# Patient Record
Sex: FEMALE | Race: ASIAN | NOT HISPANIC OR LATINO
[De-identification: names, ages, dates, MRNs, and addresses within clinical notes are randomized per-mention and may not be internally consistent; named-entity substitution may affect disease eponyms.]

---

## 2022-08-16 PROBLEM — Z00.00 ENCOUNTER FOR PREVENTIVE HEALTH EXAMINATION: Status: ACTIVE | Noted: 2022-08-16

## 2022-09-13 ENCOUNTER — APPOINTMENT (OUTPATIENT)
Dept: OBGYN | Facility: CLINIC | Age: 32
End: 2022-09-13

## 2022-09-13 VITALS
DIASTOLIC BLOOD PRESSURE: 58 MMHG | TEMPERATURE: 98 F | SYSTOLIC BLOOD PRESSURE: 78 MMHG | HEIGHT: 63 IN | BODY MASS INDEX: 26.58 KG/M2 | WEIGHT: 150 LBS | HEART RATE: 78 BPM

## 2022-09-13 DIAGNOSIS — Z78.9 OTHER SPECIFIED HEALTH STATUS: ICD-10-CM

## 2022-09-13 DIAGNOSIS — Z86.79 PERSONAL HISTORY OF OTHER DISEASES OF THE CIRCULATORY SYSTEM: ICD-10-CM

## 2022-09-13 DIAGNOSIS — Z87.42 PERSONAL HISTORY OF OTHER DISEASES OF THE FEMALE GENITAL TRACT: ICD-10-CM

## 2022-09-13 DIAGNOSIS — Z82.49 FAMILY HISTORY OF ISCHEMIC HEART DISEASE AND OTHER DISEASES OF THE CIRCULATORY SYSTEM: ICD-10-CM

## 2022-09-13 PROCEDURE — 99385 PREV VISIT NEW AGE 18-39: CPT

## 2022-09-15 PROBLEM — Z78.9 CONSUMES ALCOHOL OCCASIONALLY: Status: ACTIVE | Noted: 2022-09-13

## 2022-09-15 PROBLEM — Z78.9 EXERCISES OCCASIONALLY: Status: ACTIVE | Noted: 2022-09-13

## 2022-09-15 PROBLEM — Z87.42 HISTORY OF DYSMENORRHEA: Status: RESOLVED | Noted: 2022-09-13 | Resolved: 2022-09-15

## 2022-09-15 PROBLEM — Z86.79 HISTORY OF CARDIAC MURMUR: Status: RESOLVED | Noted: 2022-09-13 | Resolved: 2022-09-15

## 2022-09-15 PROBLEM — Z82.49 FAMILY HISTORY OF HYPERTENSION: Status: ACTIVE | Noted: 2022-09-13

## 2022-09-15 LAB
C TRACH RRNA SPEC QL NAA+PROBE: NOT DETECTED
HPV HIGH+LOW RISK DNA PNL CVX: NOT DETECTED
N GONORRHOEA RRNA SPEC QL NAA+PROBE: NOT DETECTED
SOURCE AMPLIFICATION: NORMAL
SOURCE AMPLIFICATION: NORMAL
T VAGINALIS RRNA SPEC QL NAA+PROBE: NOT DETECTED

## 2022-09-15 NOTE — DISCUSSION/SUMMARY
[FreeTextEntry1] : A: 32-year-old for annual GYN exam, dysmenorrhea, heavy menses, BMI 26\par \par P: GYN exam done\par Pap smear done\par Safe sex practices\par Pain and bleeding precautions\par Encourage menstrual diary\par Contraception counseling done-patient wants nothing at this time\par Naproxen Rx given as needed\par Referral for pelvic ultrasound given\par Preconception counseling and future fertility management options counseled\par Encourage healthy diet and exercise\par Follow-up after imaging or as needed

## 2022-09-15 NOTE — HISTORY OF PRESENT ILLNESS
[Patient reported PAP Smear was normal] : Patient reported PAP Smear was normal [N] : Patient reports normal menses [Y] : Positive pregnancy history [Regular Cycle Intervals] : periods have been regular [Frequency: Q ___ days] : menstrual periods occur approximately every [unfilled] days [Menarche Age: ____] : age at menarche was [unfilled] [No] : Patient does not have concerns regarding sex [Currently Active] : currently active [Men] : men [Yes] : Yes [Condoms] : Condoms [HIV test declined] : HIV test declined [Syphilis test declined] : Syphilis test declined [Gonorrhea test offered] : Gonorrhea test offered [Chlamydia test offered] : Chlamydia test offered [Trichomonas test offered] : Trichomonas test offered [HPV test offered] : HPV test offered [Hepatitis B test declined] : Hepatitis B test declined [Hepatitis C test declined] : Hepatitis C test declined [Menstrual Cramps] : menstrual cramps [TextBox_4] : 32-year-old para 0-0-1-0 with LMP 8/23/2022 came for annual GYN exam and Pap smear.  Patient denies abnormal uterine bleeding but states her irregular menses has become heavier for the first 2 days and also significant pain 2 days prior in the first couple days of her period over the last year.  She states that she changes a pad every 2-3 hours with some clots on the heavy days.  She denies any symptoms of anemia.  She denies any discharge or dysuria.  She also denies any abnormal Pap, HPV, STDs, fibroids or cysts.  She is sexually active and uses condoms for contraception.  She states she tried oral combined contraception in the past to help menses and for contraceptive purposes however states that it gave her a lot of emotional/psych symptoms that she did not feel comfortable to continue.\par \par Patient also has many questions regarding pregnancy and future fertility.  Preconception counseling done and patient also counseled on options of egg freezing if she is not planning fertility in the near future.  We discussed risks of AMA with pregnancy.  Patient offered referral for [facility] consultation to further evaluate her options but she does not want at this time. [PapSmeardate] : 2021 [LMPDate] : 8/23/22 [MensesFreq] : 28 [MensesLength] : 4-5 [MensesAmount] : heavy to light  [PGxTotal] : 1 [PGHxFullTerm] : 0 [PGHxPremature] : 0 [PGHxAbortions] : 1 [[facility] : 0 [PGHxABInduced] : 1 [PGHxABSpont] : 0 [PGHxEctopic] : 0 [PGHxMultBirths] : 0 [FreeTextEntry1] : 8/23/22

## 2022-09-20 ENCOUNTER — TRANSCRIPTION ENCOUNTER (OUTPATIENT)
Age: 32
End: 2022-09-20

## 2022-09-20 LAB — CYTOLOGY CVX/VAG DOC THIN PREP: NORMAL

## 2022-10-05 ENCOUNTER — APPOINTMENT (OUTPATIENT)
Dept: OBGYN | Facility: CLINIC | Age: 32
End: 2022-10-05

## 2022-10-05 DIAGNOSIS — N92.0 EXCESSIVE AND FREQUENT MENSTRUATION WITH REGULAR CYCLE: ICD-10-CM

## 2022-10-05 DIAGNOSIS — N83.8 OTHER NONINFLAMMATORY DISORDERS OF OVARY, FALLOPIAN TUBE AND BROAD LIGAMENT: ICD-10-CM

## 2022-10-05 DIAGNOSIS — N94.6 DYSMENORRHEA, UNSPECIFIED: ICD-10-CM

## 2022-10-05 PROCEDURE — 99442: CPT

## 2022-10-09 PROBLEM — N92.0 HEAVY MENSES: Status: ACTIVE | Noted: 2022-09-15

## 2022-10-09 PROBLEM — N83.8 CALCIFICATION OF OVARY: Status: ACTIVE | Noted: 2022-10-09

## 2022-10-09 PROBLEM — N94.6 DYSMENORRHEA: Status: ACTIVE | Noted: 2022-09-13

## 2022-10-11 ENCOUNTER — NON-APPOINTMENT (OUTPATIENT)
Age: 32
End: 2022-10-11

## 2022-10-27 ENCOUNTER — NON-APPOINTMENT (OUTPATIENT)
Age: 32
End: 2022-10-27

## 2022-11-10 ENCOUNTER — INPATIENT (INPATIENT)
Facility: HOSPITAL | Age: 32
LOS: 3 days | Discharge: HOME | End: 2022-11-14
Attending: INTERNAL MEDICINE | Admitting: INTERNAL MEDICINE

## 2022-11-10 VITALS
DIASTOLIC BLOOD PRESSURE: 60 MMHG | OXYGEN SATURATION: 99 % | WEIGHT: 149.91 LBS | TEMPERATURE: 98 F | RESPIRATION RATE: 18 BRPM | HEART RATE: 115 BPM | SYSTOLIC BLOOD PRESSURE: 127 MMHG

## 2022-11-10 LAB
ALBUMIN SERPL ELPH-MCNC: 3.7 G/DL — SIGNIFICANT CHANGE UP (ref 3.5–5.2)
ALBUMIN SERPL ELPH-MCNC: 4 G/DL — SIGNIFICANT CHANGE UP (ref 3.5–5.2)
ALP SERPL-CCNC: 134 U/L — HIGH (ref 30–115)
ALP SERPL-CCNC: 148 U/L — HIGH (ref 30–115)
ALT FLD-CCNC: 23 U/L — SIGNIFICANT CHANGE UP (ref 0–41)
ALT FLD-CCNC: 26 U/L — SIGNIFICANT CHANGE UP (ref 0–41)
ANION GAP SERPL CALC-SCNC: 12 MMOL/L — SIGNIFICANT CHANGE UP (ref 7–14)
ANION GAP SERPL CALC-SCNC: 14 MMOL/L — SIGNIFICANT CHANGE UP (ref 7–14)
APPEARANCE UR: CLEAR — SIGNIFICANT CHANGE UP
AST SERPL-CCNC: 34 U/L — SIGNIFICANT CHANGE UP (ref 0–41)
AST SERPL-CCNC: 38 U/L — SIGNIFICANT CHANGE UP (ref 0–41)
BACTERIA # UR AUTO: NEGATIVE — SIGNIFICANT CHANGE UP
BASOPHILS # BLD AUTO: 0.04 K/UL — SIGNIFICANT CHANGE UP (ref 0–0.2)
BASOPHILS # BLD AUTO: 0.04 K/UL — SIGNIFICANT CHANGE UP (ref 0–0.2)
BASOPHILS NFR BLD AUTO: 0.2 % — SIGNIFICANT CHANGE UP (ref 0–1)
BASOPHILS NFR BLD AUTO: 0.3 % — SIGNIFICANT CHANGE UP (ref 0–1)
BILIRUB SERPL-MCNC: 1.1 MG/DL — SIGNIFICANT CHANGE UP (ref 0.2–1.2)
BILIRUB SERPL-MCNC: 1.1 MG/DL — SIGNIFICANT CHANGE UP (ref 0.2–1.2)
BILIRUB UR-MCNC: NEGATIVE — SIGNIFICANT CHANGE UP
BLD GP AB SCN SERPL QL: SIGNIFICANT CHANGE UP
BUN SERPL-MCNC: 6 MG/DL — LOW (ref 10–20)
BUN SERPL-MCNC: 7 MG/DL — LOW (ref 10–20)
CALCIUM SERPL-MCNC: 9 MG/DL — SIGNIFICANT CHANGE UP (ref 8.4–10.5)
CALCIUM SERPL-MCNC: 9.5 MG/DL — SIGNIFICANT CHANGE UP (ref 8.4–10.4)
CHLORIDE SERPL-SCNC: 102 MMOL/L — SIGNIFICANT CHANGE UP (ref 98–110)
CHLORIDE SERPL-SCNC: 103 MMOL/L — SIGNIFICANT CHANGE UP (ref 98–110)
CK MB CFR SERPL CALC: <1 NG/ML — SIGNIFICANT CHANGE UP (ref 0.6–6.3)
CO2 SERPL-SCNC: 21 MMOL/L — SIGNIFICANT CHANGE UP (ref 17–32)
CO2 SERPL-SCNC: 21 MMOL/L — SIGNIFICANT CHANGE UP (ref 17–32)
COLOR SPEC: YELLOW — SIGNIFICANT CHANGE UP
CREAT SERPL-MCNC: 0.6 MG/DL — LOW (ref 0.7–1.5)
CREAT SERPL-MCNC: 0.7 MG/DL — SIGNIFICANT CHANGE UP (ref 0.7–1.5)
DIFF PNL FLD: NEGATIVE — SIGNIFICANT CHANGE UP
EGFR: 117 ML/MIN/1.73M2 — SIGNIFICANT CHANGE UP
EGFR: 121 ML/MIN/1.73M2 — SIGNIFICANT CHANGE UP
EOSINOPHIL # BLD AUTO: 0.09 K/UL — SIGNIFICANT CHANGE UP (ref 0–0.7)
EOSINOPHIL # BLD AUTO: 0.1 K/UL — SIGNIFICANT CHANGE UP (ref 0–0.7)
EOSINOPHIL NFR BLD AUTO: 0.6 % — SIGNIFICANT CHANGE UP (ref 0–8)
EOSINOPHIL NFR BLD AUTO: 0.6 % — SIGNIFICANT CHANGE UP (ref 0–8)
EPI CELLS # UR: 2 /HPF — SIGNIFICANT CHANGE UP (ref 0–5)
GLUCOSE SERPL-MCNC: 111 MG/DL — HIGH (ref 70–99)
GLUCOSE SERPL-MCNC: 113 MG/DL — HIGH (ref 70–99)
GLUCOSE UR QL: NEGATIVE — SIGNIFICANT CHANGE UP
HCG SERPL QL: NEGATIVE — SIGNIFICANT CHANGE UP
HCT VFR BLD CALC: 29.3 % — LOW (ref 37–47)
HCT VFR BLD CALC: 31.8 % — LOW (ref 37–47)
HGB BLD-MCNC: 10.3 G/DL — LOW (ref 12–16)
HGB BLD-MCNC: 9.6 G/DL — LOW (ref 12–16)
HYALINE CASTS # UR AUTO: 0 /LPF — SIGNIFICANT CHANGE UP (ref 0–7)
IMM GRANULOCYTES NFR BLD AUTO: 0.4 % — HIGH (ref 0.1–0.3)
IMM GRANULOCYTES NFR BLD AUTO: 0.5 % — HIGH (ref 0.1–0.3)
IRON SATN MFR SERPL: 28 UG/DL — LOW (ref 35–150)
IRON SATN MFR SERPL: 9 % — LOW (ref 15–50)
KETONES UR-MCNC: NEGATIVE — SIGNIFICANT CHANGE UP
LEUKOCYTE ESTERASE UR-ACNC: NEGATIVE — SIGNIFICANT CHANGE UP
LYMPHOCYTES # BLD AUTO: 1.79 K/UL — SIGNIFICANT CHANGE UP (ref 1.2–3.4)
LYMPHOCYTES # BLD AUTO: 12.8 % — LOW (ref 20.5–51.1)
LYMPHOCYTES # BLD AUTO: 13.3 % — LOW (ref 20.5–51.1)
LYMPHOCYTES # BLD AUTO: 2.25 K/UL — SIGNIFICANT CHANGE UP (ref 1.2–3.4)
MCHC RBC-ENTMCNC: 26.2 PG — LOW (ref 27–31)
MCHC RBC-ENTMCNC: 26.7 PG — LOW (ref 27–31)
MCHC RBC-ENTMCNC: 32.4 G/DL — SIGNIFICANT CHANGE UP (ref 32–37)
MCHC RBC-ENTMCNC: 32.8 G/DL — SIGNIFICANT CHANGE UP (ref 32–37)
MCV RBC AUTO: 80.9 FL — LOW (ref 81–99)
MCV RBC AUTO: 81.6 FL — SIGNIFICANT CHANGE UP (ref 81–99)
MONOCYTES # BLD AUTO: 0.37 K/UL — SIGNIFICANT CHANGE UP (ref 0.1–0.6)
MONOCYTES # BLD AUTO: 0.48 K/UL — SIGNIFICANT CHANGE UP (ref 0.1–0.6)
MONOCYTES NFR BLD AUTO: 2.6 % — SIGNIFICANT CHANGE UP (ref 1.7–9.3)
MONOCYTES NFR BLD AUTO: 2.8 % — SIGNIFICANT CHANGE UP (ref 1.7–9.3)
NEUTROPHILS # BLD AUTO: 11.63 K/UL — HIGH (ref 1.4–6.5)
NEUTROPHILS # BLD AUTO: 13.96 K/UL — HIGH (ref 1.4–6.5)
NEUTROPHILS NFR BLD AUTO: 82.6 % — HIGH (ref 42.2–75.2)
NEUTROPHILS NFR BLD AUTO: 83.3 % — HIGH (ref 42.2–75.2)
NITRITE UR-MCNC: NEGATIVE — SIGNIFICANT CHANGE UP
NRBC # BLD: 0 /100 WBCS — SIGNIFICANT CHANGE UP (ref 0–0)
NRBC # BLD: 0 /100 WBCS — SIGNIFICANT CHANGE UP (ref 0–0)
NT-PROBNP SERPL-SCNC: 2247 PG/ML — HIGH (ref 0–300)
PH UR: 6.5 — SIGNIFICANT CHANGE UP (ref 5–8)
PLATELET # BLD AUTO: 293 K/UL — SIGNIFICANT CHANGE UP (ref 130–400)
PLATELET # BLD AUTO: 342 K/UL — SIGNIFICANT CHANGE UP (ref 130–400)
POTASSIUM SERPL-MCNC: 4.5 MMOL/L — SIGNIFICANT CHANGE UP (ref 3.5–5)
POTASSIUM SERPL-MCNC: 4.6 MMOL/L — SIGNIFICANT CHANGE UP (ref 3.5–5)
POTASSIUM SERPL-SCNC: 4.5 MMOL/L — SIGNIFICANT CHANGE UP (ref 3.5–5)
POTASSIUM SERPL-SCNC: 4.6 MMOL/L — SIGNIFICANT CHANGE UP (ref 3.5–5)
PROT SERPL-MCNC: 7.2 G/DL — SIGNIFICANT CHANGE UP (ref 6–8)
PROT SERPL-MCNC: 7.5 G/DL — SIGNIFICANT CHANGE UP (ref 6–8)
PROT UR-MCNC: ABNORMAL
RBC # BLD: 3.59 M/UL — LOW (ref 4.2–5.4)
RBC # BLD: 3.93 M/UL — LOW (ref 4.2–5.4)
RBC # FLD: 14.6 % — HIGH (ref 11.5–14.5)
RBC # FLD: 14.6 % — HIGH (ref 11.5–14.5)
RBC CASTS # UR COMP ASSIST: 1 /HPF — SIGNIFICANT CHANGE UP (ref 0–4)
SARS-COV-2 RNA SPEC QL NAA+PROBE: SIGNIFICANT CHANGE UP
SODIUM SERPL-SCNC: 136 MMOL/L — SIGNIFICANT CHANGE UP (ref 135–146)
SODIUM SERPL-SCNC: 137 MMOL/L — SIGNIFICANT CHANGE UP (ref 135–146)
SP GR SPEC: 1.03 — SIGNIFICANT CHANGE UP (ref 1.01–1.03)
TIBC SERPL-MCNC: 306 UG/DL — SIGNIFICANT CHANGE UP (ref 220–430)
TROPONIN T SERPL-MCNC: <0.01 NG/ML — SIGNIFICANT CHANGE UP
UIBC SERPL-MCNC: 278 UG/DL — SIGNIFICANT CHANGE UP (ref 110–370)
UROBILINOGEN FLD QL: SIGNIFICANT CHANGE UP
WBC # BLD: 13.98 K/UL — HIGH (ref 4.8–10.8)
WBC # BLD: 16.92 K/UL — HIGH (ref 4.8–10.8)
WBC # FLD AUTO: 13.98 K/UL — HIGH (ref 4.8–10.8)
WBC # FLD AUTO: 16.92 K/UL — HIGH (ref 4.8–10.8)
WBC UR QL: 1 /HPF — SIGNIFICANT CHANGE UP (ref 0–5)

## 2022-11-10 PROCEDURE — 99223 1ST HOSP IP/OBS HIGH 75: CPT

## 2022-11-10 PROCEDURE — 93010 ELECTROCARDIOGRAM REPORT: CPT

## 2022-11-10 PROCEDURE — 71275 CT ANGIOGRAPHY CHEST: CPT | Mod: 26,MA

## 2022-11-10 PROCEDURE — 99285 EMERGENCY DEPT VISIT HI MDM: CPT

## 2022-11-10 PROCEDURE — 93306 TTE W/DOPPLER COMPLETE: CPT | Mod: 26

## 2022-11-10 RX ORDER — PANTOPRAZOLE SODIUM 20 MG/1
40 TABLET, DELAYED RELEASE ORAL
Refills: 0 | Status: DISCONTINUED | OUTPATIENT
Start: 2022-11-10 | End: 2022-11-14

## 2022-11-10 RX ORDER — GUAIFENESIN/DEXTROMETHORPHAN 600MG-30MG
10 TABLET, EXTENDED RELEASE 12 HR ORAL ONCE
Refills: 0 | Status: COMPLETED | OUTPATIENT
Start: 2022-11-10 | End: 2022-11-10

## 2022-11-10 RX ORDER — FAMOTIDINE 10 MG/ML
20 INJECTION INTRAVENOUS ONCE
Refills: 0 | Status: COMPLETED | OUTPATIENT
Start: 2022-11-10 | End: 2022-11-10

## 2022-11-10 RX ORDER — FUROSEMIDE 40 MG
40 TABLET ORAL ONCE
Refills: 0 | Status: COMPLETED | OUTPATIENT
Start: 2022-11-10 | End: 2022-11-10

## 2022-11-10 RX ADMIN — FAMOTIDINE 20 MILLIGRAM(S): 10 INJECTION INTRAVENOUS at 03:32

## 2022-11-10 RX ADMIN — Medication 30 MILLILITER(S): at 03:32

## 2022-11-10 RX ADMIN — Medication 10 MILLILITER(S): at 03:32

## 2022-11-10 RX ADMIN — Medication 40 MILLIGRAM(S): at 05:25

## 2022-11-10 NOTE — ED PROVIDER NOTE - NS ED ROS FT
Constitutional:  No fever, chills, lethargy, or abnormal weight loss  Eyes:  No eye pain or visual changes  ENMT: No nasal discharge, no toothache, no sore throat. No neck pain or stiffness  Cardiac:  see HPI  Respiratory:  see HPI   GI:  No nausea, vomiting, diarrhea or abdominal pain.  :  No dysuria, frequency or burning.  MS:  No back or joint pain.  Neuro:  No headache. No numbness, weakness, or tingling.   Skin:  No skin rash  Except as documented in the HPI,  all other systems are negative

## 2022-11-10 NOTE — ED ADULT NURSE REASSESSMENT NOTE - NS ED NURSE REASSESS COMMENT FT1
Patient assessed. Patient is A&Ox4. Patient denies any chest pain or discomfort. IV patent. No signs of distress noted. Patient resting comfortably at this time.

## 2022-11-10 NOTE — H&P ADULT - HISTORY OF PRESENT ILLNESS
31 yo F w PMH of congenital VSD (uncorrected) presented to the ED w chronic cough, SOB, hematemesis & hemoptysis.  Pt co chronic cough for the past 2 months, initially non-productive, now slightly productive associated with SOB. Initially she was able to climb one flight of stairs, but the SOB & cough have worsened over the past 3 weeks, now claims that she would be short of breath if she walked 5ft  She was seen by her PCP & pulmonologist for the cough & sob and was advised to use inhalers and azithromycin with no relief.  Yesterday night her cough worsened and also had an episode of vomiting, found 10-15 ml of blood mixed with the vomitus. Her cough episode later showed sputum w blood streaks. Claims that this was new, and never happened in the past 2 months.  Never smoked in life, FMHx NG for bleeding disorders, denies PND, orthopnea.    In the ED, , SpO2 97 on RA  labs: WBC 13.96, Hb 9.6 (baseline unknown), BNP 2247, trop <0.01, UA wnl  EKG: Line Sinus tachycardia + Possible Left atrial enlargement  CTA PE: Small right pleural effusion + bibasilar interlobular septal thickening + diffuse hazy ground-glass appearance (slightly more focal in the LLL)    received 40mg lasix IV in the ED

## 2022-11-10 NOTE — CONSULT NOTE ADULT - ATTENDING COMMENTS
Briefly, 32 year old woman who presented with shortness of breath, cough and hemoptysis. She has a known unrepaired VSD. 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 She also has a VSD, probably supracristal. Patient is currently sitting upright and able to speak in complete sentences, eating her breakfast. Her lungs are clear but she does have an elevated JVP. She needs to be kept euvolemic with lasix 40 iv. She said the PO dose of the lasix did not help her. She needs a TAVO today. Will discuss further management with CTS.     Thank you for this consult. Please call/MS teams with questions.

## 2022-11-10 NOTE — ED PROVIDER NOTE - PHYSICAL EXAMINATION
Gen: Alert, NAD, well appearing  Head: NC, AT  ENT: patent oropharynx without erythema/exudate, uvula midline  Neck: +supple, no tenderness/meningismus/JVD, +Trachea midline  Pulm: Bilateral BS, no wheeze/stridor/retractions, getting SOB while talking, mildly tachypneic  CV: tachycardic, +dist pulses  Mskel: no edema/erythema/cyanosis  Skin: no rash, warm/dry  Neuro: AAOx3, no sensory/motor deficits, CN 2-12 intact

## 2022-11-10 NOTE — CONSULT NOTE ADULT - NS ATTEND AMEND GEN_ALL_CORE FT
The patient is a 32-year-old lady we were called to the emergency room to evaluate for her new symptoms of hemoptysis and shortness of breath.    The patient herself tells me that she is known to have a cardiac murmur since birth and was told about her ventricular septal defect and apparently the patient and her mother were informed that this would close by the age of 4 but unfortunately never did.  Apparently many children in the family that she was born into had this defect but all of them closed and have no longer had a VSD.    The patient tells me that she lives in [place] and was visiting her boyfriend here in [place] when she had this episode of hemoptysis.  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    She said that she used to see a cardiologist on a regular basis till about 2017 and was not on any medications at any time during her course.    She denies any obvious source of an infective process like a rash or a skin infection or urinary infection or tooth infection etc.    She says that she was quite functional till about a few months ago as noted above in the history.    Even during this episode she denies any fever, new rashes, syncope, presyncope etc.    Clinically the patient is stable but she is clearly short of breath and says that she prefers to sit up.    On examination she has a very loud murmur of her ventricular septal defect and she also appears to have a separate murmur of mitral regurgitation in the left axilla radiating to the back.    There is no edema in her legs.    There is always a concern for infective endocarditis and possibly an acute rupture of one of the chordae to the mitral valve to explain her sudden onset of symptomatology which has been progressively been getting worse.    There is also concern for significant pulmonary hypertension given her lifelong history of a ventricular septal defect and the large pulmonary artery that is seen on the CT scan of her chest.    I discussed all these issues with the patient and the physicians looking after her in the observation unit in the emergency room in detail.    She will need all the suggestions that have been recorded above and below regarding optimizing her management.    I spent a significant amount of time at the patient's bedside as she had multiple questions and these were all answered.  More than 50% of my time was spent answering questions, counseling and coordinating her care.    For the present time her best option would be aggressive management by the cardiology team.

## 2022-11-10 NOTE — H&P ADULT - TIME BILLING
HPI as above.  Interval history: Pt seen and examined at bedside. No cp or sob.   Vital Signs (24 Hrs):  T(C): 36.9 (11-10-22 @ 07:30), Max: 36.9 (11-10-22 @ 07:30)  HR: 111 (11-10-22 @ 07:30) (98 - 115)  BP: 108/76 (11-10-22 @ 07:30) (108/76 - 127/60)  RR: 18 (11-10-22 @ 07:30) (16 - 18)  SpO2: 97% (11-10-22 @ 07:30) (97% - 99%)  Wt(kg): --  Daily     Daily     I&O's Summary    PHYSICAL EXAM:  GENERAL: NAD, well-developed  HEAD:  Atraumatic, Normocephalic  EYES: EOMI, PERRLA, conjunctiva and sclera clear  NECK: Supple, No JVD  CHEST/LUNG: mild rales   HEART: tach rate and rhythm; + murmurs, No rubs, or gallops  ABDOMEN: Soft, Nontender, Nondistended; Bowel sounds present  EXTREMITIES:  2+ Peripheral Pulses, No clubbing, cyanosis, or edema  PSYCH: AAOx3  NEUROLOGY: non-focal  SKIN: No rashes or lesions  Labs reviewed  Imaging reviewed independently and reviewed read  < from: CT Angio Chest PE Protocol w/ IV Cont (11.10.22 @ 04:23) >    IMPRESSION:    No CTA evidence of acute pulmonary embolus.    Small right pleural effusion with predominantly bibasilar interlobular   septal thickening and diffuse hazy groundglass appearance of the lungs,   slightly more focal in the left lower lobe. Correlate for pulmonary edema   and/or CHF.    < end of copied text >      EKG reviewed independently and reviewed read  < from: 12 Lead ECG (11.10.22 @ 05:30) >    Diagnosis Line Sinus tachycardia  Possible Left atrial enlargement  Borderline ECG    < end of copied text >      Plan      #Progress Note Handoff  Pending (specify):  Consults_________, Tests________, Test Results_______, Other_________  Family discussion:  Disposition: Home___/SNF___/Other________/Unknown at this time________ HPI as above.  Interval history: Pt seen and examined at bedside. Pt states that over the past few months she has been having sob and coughing. Pt has seen pulm and primary care physician outpt. Pt also endorses orthopnea,  and sob and fatigue  when she ambulates a short distance.  coughed up blood?  Vital Signs (24 Hrs):  T(C): 36.9 (11-10-22 @ 07:30), Max: 36.9 (11-10-22 @ 07:30)  HR: 111 (11-10-22 @ 07:30) (98 - 115)  BP: 108/76 (11-10-22 @ 07:30) (108/76 - 127/60)  RR: 18 (11-10-22 @ 07:30) (16 - 18)  SpO2: 97% (11-10-22 @ 07:30) (97% - 99%)  Wt(kg): --  Daily     Daily     I&O's Summary    PHYSICAL EXAM:  GENERAL: NAD, well-developed  HEAD:  Atraumatic, Normocephalic  EYES: EOMI, PERRLA, conjunctiva and sclera clear  NECK: Supple, No JVD  CHEST/LUNG: mild rales   HEART: tach rate and rhythm; + murmurs, No rubs, or gallops  ABDOMEN: Soft, Nontender, Nondistended; Bowel sounds present  EXTREMITIES:  2+ Peripheral Pulses, No clubbing, cyanosis, or edema  PSYCH: AAOx3  NEUROLOGY: non-focal  SKIN: No rashes or lesions  Labs reviewed  Imaging reviewed independently and reviewed read  < from: CT Angio Chest PE Protocol w/ IV Cont (11.10.22 @ 04:23) >    IMPRESSION:    No CTA evidence of acute pulmonary embolus.    Small right pleural effusion with predominantly bibasilar interlobular   septal thickening and diffuse hazy groundglass appearance of the lungs,   slightly more focal in the left lower lobe. Correlate for pulmonary edema   and/or CHF.    < end of copied text >      EKG reviewed independently and reviewed read  < from: 12 Lead ECG (11.10.22 @ 05:30) >    Diagnosis Line Sinus tachycardia  Possible Left atrial enlargement  Borderline ECG    < end of copied text >      Plan  #suspected new onset Acute CHF- +murmur on exam, pbnp elevated 2K, trop neg, ct scan suspicious of CHF,  orthopnea and sob on ambulation, check echo (based on echo will get cardio), check RVP, trend trops, cardiac telemonitoring , o2 if needed pt is on RA. PE neg   #sinus tach- check echo, PE neg, hold off IVF    #Progress Note Handoff  Pending (specify):  Consults_________, Tests________, Test Results_______, Other_________  Family discussion:  Disposition: Home___/SNF___/Other________/Unknown at this time________ HPI as above.  Interval history: Pt seen and examined at bedside. Pt states that over the past few months she has been having sob and coughing. Pt has seen pulm and primary care physician outpt. Pt also endorses orthopnea,  and sob and fatigue  when she ambulates a short distance.  coughed up blood?  Vital Signs (24 Hrs):  T(C): 36.9 (11-10-22 @ 07:30), Max: 36.9 (11-10-22 @ 07:30)  HR: 111 (11-10-22 @ 07:30) (98 - 115)  BP: 108/76 (11-10-22 @ 07:30) (108/76 - 127/60)  RR: 18 (11-10-22 @ 07:30) (16 - 18)  SpO2: 97% (11-10-22 @ 07:30) (97% - 99%)  Wt(kg): --  Daily     Daily     I&O's Summary    PHYSICAL EXAM:  GENERAL: NAD, well-developed  HEAD:  Atraumatic, Normocephalic  EYES: EOMI, PERRLA, conjunctiva and sclera clear  NECK: Supple, No JVD  CHEST/LUNG: mild rales   HEART: tach rate and rhythm; + murmurs, No rubs, or gallops  ABDOMEN: Soft, Nontender, Nondistended; Bowel sounds present  EXTREMITIES:  2+ Peripheral Pulses, No clubbing, cyanosis, or edema  PSYCH: AAOx3  NEUROLOGY: non-focal  SKIN: No rashes or lesions  Labs reviewed  Imaging reviewed independently and reviewed read  < from: CT Angio Chest PE Protocol w/ IV Cont (11.10.22 @ 04:23) >    IMPRESSION:    No CTA evidence of acute pulmonary embolus.    Small right pleural effusion with predominantly bibasilar interlobular   septal thickening and diffuse hazy groundglass appearance of the lungs,   slightly more focal in the left lower lobe. Correlate for pulmonary edema   and/or CHF.    < end of copied text >      EKG reviewed independently and reviewed read  < from: 12 Lead ECG (11.10.22 @ 05:30) >    Diagnosis Line Sinus tachycardia  Possible Left atrial enlargement  Borderline ECG    < end of copied text >      Plan  #suspected new onset Acute CHF- +murmur on exam, pbnp elevated 2K, trop neg, ct scan suspicious of CHF,  orthopnea and sob on ambulation, check echo (based on echo will get cardio), check RVP, trend trops, cardiac telemonitoring , o2 if needed pt is on RA. PE neg   #sinus tach- check echo, PE neg, hold off IVF  #leukocytosis-     #Progress Note Handoff  Pending (specify):  Consults_________, Tests________, Test Results_______, Other_________  Family discussion:  Disposition: Home___/SNF___/Other________/Unknown at this time________ HPI as above.  Interval history: Pt seen and examined at bedside. Pt states that over the past few months she has been having sob and coughing. Pt has seen pulm and primary care physician outpt. Pt also endorses orthopnea,  and sob and fatigue  when she ambulates a short distance.  coughed up blood?  Vital Signs (24 Hrs):  T(C): 36.9 (11-10-22 @ 07:30), Max: 36.9 (11-10-22 @ 07:30)  HR: 111 (11-10-22 @ 07:30) (98 - 115)  BP: 108/76 (11-10-22 @ 07:30) (108/76 - 127/60)  RR: 18 (11-10-22 @ 07:30) (16 - 18)  SpO2: 97% (11-10-22 @ 07:30) (97% - 99%)  Wt(kg): --  Daily     Daily     I&O's Summary    PHYSICAL EXAM:  GENERAL: NAD, well-developed  HEAD:  Atraumatic, Normocephalic  EYES: EOMI, PERRLA, conjunctiva and sclera clear  NECK: Supple, No JVD  CHEST/LUNG: mild rales   HEART: tach rate and rhythm; + murmurs, No rubs, or gallops  ABDOMEN: Soft, Nontender, Nondistended; Bowel sounds present  EXTREMITIES:  2+ Peripheral Pulses, No clubbing, cyanosis, or edema  PSYCH: AAOx3  NEUROLOGY: non-focal  SKIN: No rashes or lesions  Labs reviewed  Imaging reviewed independently and reviewed read  < from: CT Angio Chest PE Protocol w/ IV Cont (11.10.22 @ 04:23) >    IMPRESSION:    No CTA evidence of acute pulmonary embolus.    Small right pleural effusion with predominantly bibasilar interlobular   septal thickening and diffuse hazy groundglass appearance of the lungs,   slightly more focal in the left lower lobe. Correlate for pulmonary edema   and/or CHF.    < end of copied text >      EKG reviewed independently and reviewed read  < from: 12 Lead ECG (11.10.22 @ 05:30) >    Diagnosis Line Sinus tachycardia  Possible Left atrial enlargement  Borderline ECG    < end of copied text >      Plan  #suspected new onset Acute CHF- +murmur on exam, pbnp elevated 2K, trop neg, ct scan suspicious of CHF,  orthopnea and sob on ambulation, check echo (based on echo will get cardio), check RVP, trend trops, cardiac telemonitoring , o2 if needed pt is on RA. PE neg   #sinus tach- check echo, PE neg, hold off IVF  #leukocytosis- afebirle, check cbc in am, check RVP, UA, Blood cultures- hold off antibiotics. , no pneumonia on CT scan, procal  #rest as above    #Progress Note Handoff  Pending (specify):  follow up echo, rvp, wbc, cultures  Disposition: home 48-72 hrs, PFD placed  time spent on review of labs, imaging studies, old records, obtaining history, personally examining patient, counselling and communicating with patient, entering orders for medications/tests/etc, discussions with other health care providers, documentation in electronic health records, independent interpretation of labs, imaging/procedure results and care coordination. HPI as above.  Interval history: Pt seen and examined at bedside. Pt states that over the past few months she has been having sob and coughing. Pt has seen pulm and primary care physician outpt. Pt also endorses orthopnea,  and sob and fatigue  when she ambulates a short distance.  coughed up blood?  Vital Signs (24 Hrs):  T(C): 36.9 (11-10-22 @ 07:30), Max: 36.9 (11-10-22 @ 07:30)  HR: 111 (11-10-22 @ 07:30) (98 - 115)  BP: 108/76 (11-10-22 @ 07:30) (108/76 - 127/60)  RR: 18 (11-10-22 @ 07:30) (16 - 18)  SpO2: 97% (11-10-22 @ 07:30) (97% - 99%)  Wt(kg): --  Daily     Daily     I&O's Summary    PHYSICAL EXAM:  GENERAL: NAD, well-developed  HEAD:  Atraumatic, Normocephalic  EYES: EOMI, PERRLA, conjunctiva and sclera clear  NECK: Supple, No JVD  CHEST/LUNG: mild rales   HEART: tach rate and rhythm; + murmurs, No rubs, or gallops  ABDOMEN: Soft, Nontender, Nondistended; Bowel sounds present  EXTREMITIES:  2+ Peripheral Pulses, No clubbing, cyanosis, or edema  PSYCH: AAOx3  NEUROLOGY: non-focal  SKIN: No rashes or lesions  Labs reviewed  Imaging reviewed independently and reviewed read  < from: CT Angio Chest PE Protocol w/ IV Cont (11.10.22 @ 04:23) >    IMPRESSION:    No CTA evidence of acute pulmonary embolus.    Small right pleural effusion with predominantly bibasilar interlobular   septal thickening and diffuse hazy groundglass appearance of the lungs,   slightly more focal in the left lower lobe. Correlate for pulmonary edema   and/or CHF.    < end of copied text >      EKG reviewed independently and reviewed read  < from: 12 Lead ECG (11.10.22 @ 05:30) >    Diagnosis Line Sinus tachycardia  Possible Left atrial enlargement  Borderline ECG    < end of copied text >      Plan  #suspected new onset Acute CHF- +murmur on exam, pbnp elevated 2K, trop neg, ct scan suspicious of CHF,  orthopnea and sob on ambulation, check echo (based on echo will get cardio), check RVP, trend trops, cardiac telemonitoring , o2 if needed pt is on RA. PE neg   #sinus tach- check echo, PE neg, hold off IVF  #leukocytosis- afebrile, check cbc , check RVP, UA, Blood cultures- hold off antibiotics. , no pneumonia on CT scan, procal  #Anemia-? blood loss? pt stats that had teaspoon of blod post vomitting?- no hx of anemia- will repeat cbc and follow, if droping will get GI, active TS, transfuse  if <7, iron studies  #rest as above    #Progress Note Handoff  Pending (specify):  follow up echo, rvp, wbc, cultures  Disposition: home 48-72 hrs, PFD placed  time spent on review of labs, imaging studies, old records, obtaining history, personally examining patient, counselling and communicating with patient, entering orders for medications/tests/etc, discussions with other health care providers, documentation in electronic health records, independent interpretation of labs, imaging/procedure results and care coordination.

## 2022-11-10 NOTE — H&P ADULT - ASSESSMENT
31 yo F w congenital unrepaired VSD presented w chronic cough, sob, hematemesis & hemoptysis    #chronic cough & sob due to acute CHF? sp azithromycin  #congenital VSD  - No JVD/LE edema, but coarse Breath sounds  - BNP 2247, trops NG*01  - CTA: small R pl.effusion + diffuse GGO + bibasilar interlobular thickening  - fu TTE, consider cardio consult  - fu trops @ 4 and 8pm  - fu RVP  - fu BCx, trend WBC  - fu procal  - consider pulm consult based on TTE  - admit to tele    #normocytic anemia  #hematemesis & hemoptysis?  - blood in vomitus (due to retching/violet emesis) & blood in sputum (residual blood in oropharynx vs due to HF)  - Hb 9.6, baseline unknown  - fu Iron panel  - active type & screen    dvt ppx: none  GI ppx: protonix  diet: regular  code: full

## 2022-11-10 NOTE — ED PROVIDER NOTE - NS ED ATTENDING STATEMENT MOD
This was a shared visit with the LAWSON. I reviewed and verified the documentation and independently performed the documented: Attending with

## 2022-11-10 NOTE — ED PROVIDER NOTE - OBJECTIVE STATEMENT
32 year old female with PMHX of congenital VSD coming in with complaints of cough. Patient reports that tonight she woke up in a fib of coughing, went to bathroom and vomited, and then noticed a little more than a quarter size of blood in her sputum, now also with squeezing CP located in the middle of her chest, worsened with cough, and SOB. Patient reports that she's had and ongoing cough for the past two months, initially dry but now getting more productive with clear phlegm. Has seen two primary care doctors as well as a pulmonologist, been prescribed nebulizer, antibiotics, with no relief. Supposed to see an ENT doctor tomorrow. 32 year old female with PMHX of congenital VSD coming in with complaints of cough. Patient reports that tonight she woke up in a fit of coughing, went to bathroom and vomited, and then noticed a little more than a quarter size of blood in her sputum, now also with squeezing CP located in the middle of her chest, worsened with cough, and SOB. Patient reports that she's had and ongoing cough for the past two months, initially dry but now getting more productive with clear phlegm. Has seen two primary care doctors as well as a pulmonologist, been prescribed nebulizer, antibiotics, with no relief. Supposed to see an ENT doctor tomorrow.

## 2022-11-10 NOTE — H&P ADULT - NSHPPHYSICALEXAM_GEN_ALL_CORE
T(C): 36.9 (11-10-22 @ 07:30), Max: 36.9 (11-10-22 @ 07:30)  HR: 111 (11-10-22 @ 07:30) (98 - 115)  BP: 108/76 (11-10-22 @ 07:30) (108/76 - 127/60)  RR: 18 (11-10-22 @ 07:30) (16 - 18)  SpO2: 97% (11-10-22 @ 07:30) (97% - 99%)    CONSTITUTIONAL: Well groomed, no apparent distress  EYES: PERRLA and symmetric, EOMI, No conjunctival or scleral injection, non-icteric  ENMT: Oral mucosa with moist membranes. Normal dentition; no pharyngeal injection or exudates, no blood in the nose & mouth             NECK: Supple, symmetric and without tracheal deviation   RESP: breath sounds coarse bl, crackles vs rales?  CV: RRR, +S1S2, no MRG; no JVD; no peripheral edema  GI: Soft, NT, ND, no rebound, no guarding; no palpable masses; no hepatosplenomegaly; no hernia palpated  LYMPH: No cervical LAD or tenderness; no axillary LAD or tenderness; no inguinal LAD or tenderness  MSK: Normal gait; No digital clubbing or cyanosis; examination of the (head/neck/spine/ribs/pelvis, RUE, LUE, RLE, LLE) without misalignment,            Normal ROM without pain, no spinal tenderness, normal muscle strength/tone  SKIN: No rashes or ulcers noted; no subcutaneous nodules or induration palpable  NEURO: CN II-XII intact; normal reflexes in upper and lower extremities, sensation intact in upper and lower extremities b/l to light touch   PSYCH: Appropriate insight/judgment; A+O x 3, mood and affect appropriate, recent/remote memory intact

## 2022-11-10 NOTE — ED PROVIDER NOTE - ATTENDING APP SHARED VISIT CONTRIBUTION OF CARE
32-year-old female presents to ED for cough.  Patient states since Labor Day she has been coughing.  She saw her primary care doctor who gave her a Z-Dhruv as well as a pulmonologist who gave her a Z-Dhruv however she states she still coughing.  Patient has an appointment with an ENT this morning.  Last night she woke up coughing went to the bathroom and vomited and noticed a little blood in her sputum which got her concerned so she decided to come to the emergency department.  Patient states she had a chest x-ray in September which was normal.  She has not had any fever chills. Patient now states she has some burning sensation in acidic feeling after she vomited.    Const: actively coughing   Eyes: PERRL, no conjunctival injection  HENT:  Neck supple without meningismus   CV: tachycardic, Warm, well-perfused extremities  RESP: CTA B/L, no tachypnea   GI: soft, non-tender, non-distended  MSK: No gross deformities appreciated  Skin: Warm, dry. No rashes  Neuro: Alert, CNs II-XII grossly intact. Sensation and motor function of extremities grossly intact.  Psych: Appropriate mood and affect.    will give maalox, pepcid and cough syrup

## 2022-11-10 NOTE — CONSULT NOTE ADULT - ASSESSMENT
IMPRESSION  Severe MR with flail anterior leaflet - r/o endocarditis  Sever TR  Fluid overload due to HFpEF with valvular heart disease  VSD    Hemoptysis - resolved  Chronic cough     RECOMMENDATIONS  c/w telemonitoring   Echo reviewed, pt in mild fluid overload, hemodynamically stable  check blood Cx X 2, ESR, CRP  start Lasix 40mg I/V qd for now, monitor urine output  Strict Is and Os, daily weight  recommend ENT evaluation for recent episodes of hemoptysis   once cleared by ENT will schedule pt for TAVO to better evaluate MR  CT Sx following  Pt may need upgrade to cardio stepdown/CCU if becomes hemodynamically unstable  will follow IMPRESSION  Severe MR with flail anterior leaflet - r/o endocarditis  Sever TR  Fluid overload severe valvular regurgitation  VSD    Hemoptysis - resolved  Chronic cough     RECOMMENDATIONS  c/w telemonitoring   Echo reviewed, pt in mild fluid overload, hemodynamically stable  check blood Cx X 2, ESR, CRP  start Lasix 40mg I/V qd for now, monitor urine output  Strict Is and Os, daily weight  recommend ENT evaluation for recent episodes of hemoptysis   once cleared by ENT will schedule pt for TAVO to better evaluate MR  CT Sx following  Pt may need upgrade to cardio stepdown/CCU if becomes hemodynamically unstable  will follow

## 2022-11-10 NOTE — ED PROVIDER NOTE - CLINICAL SUMMARY MEDICAL DECISION MAKING FREE TEXT BOX
33 yo F presented to ED for cough and sob and was found to have new onset CHF. pt was given lasix and placed on cardiac monitor. cardiology was consulted and pt was placed in tele for further evaluation and management.

## 2022-11-10 NOTE — H&P ADULT - NSHPLABSRESULTS_GEN_ALL_CORE
Spoke to patient in regards to abnormal labs.    CBC Full  -  ( 10 Nov 2022 04:03 )  WBC Count : 13.98 K/uL  Hemoglobin : 9.6 g/dL  Hematocrit : 29.3 %  Platelet Count - Automated : 293 K/uL  Mean Cell Volume : 81.6 fL  Mean Cell Hemoglobin : 26.7 pg  Mean Cell Hemoglobin Concentration : 32.8 g/dL  Auto Neutrophil # : 11.63 K/uL  Auto Lymphocyte # : 1.79 K/uL  Auto Monocyte # : 0.37 K/uL  Auto Eosinophil # : 0.09 K/uL  Auto Basophil # : 0.04 K/uL  Auto Neutrophil % : 83.3 %  Auto Lymphocyte % : 12.8 %  Auto Monocyte % : 2.6 %  Auto Eosinophil % : 0.6 %  Auto Basophil % : 0.3 %    BMP:    11-10 @ 04:03    Blood Urea Nitrogen - 7  Calcium - 9.0  Carbond Dioxide - 21  Chloride - 103  Creatinine - 0.7  Glucose - 111  Potassium - 4.6  Sodium - 136      Hemoglobin A1c -     Urine Culture:

## 2022-11-10 NOTE — ED PROVIDER NOTE - PROGRESS NOTE DETAILS
SG: pt stable. Cant PERC out due to tachycardia and D Dimer bc pt has been having symptoms too long. Rule out PE and cardiac workup. Discussed plan with pt. Will continue to monitor and reassess.

## 2022-11-11 LAB
ALBUMIN SERPL ELPH-MCNC: 3.8 G/DL — SIGNIFICANT CHANGE UP (ref 3.5–5.2)
ALP SERPL-CCNC: 147 U/L — HIGH (ref 30–115)
ALT FLD-CCNC: 34 U/L — SIGNIFICANT CHANGE UP (ref 0–41)
ANION GAP SERPL CALC-SCNC: 15 MMOL/L — HIGH (ref 7–14)
AST SERPL-CCNC: 38 U/L — SIGNIFICANT CHANGE UP (ref 0–41)
BASOPHILS # BLD AUTO: 0.04 K/UL — SIGNIFICANT CHANGE UP (ref 0–0.2)
BASOPHILS NFR BLD AUTO: 0.2 % — SIGNIFICANT CHANGE UP (ref 0–1)
BILIRUB SERPL-MCNC: 1.1 MG/DL — SIGNIFICANT CHANGE UP (ref 0.2–1.2)
BUN SERPL-MCNC: 7 MG/DL — LOW (ref 10–20)
CALCIUM SERPL-MCNC: 9.6 MG/DL — SIGNIFICANT CHANGE UP (ref 8.4–10.5)
CHLORIDE SERPL-SCNC: 101 MMOL/L — SIGNIFICANT CHANGE UP (ref 98–110)
CO2 SERPL-SCNC: 20 MMOL/L — SIGNIFICANT CHANGE UP (ref 17–32)
CREAT SERPL-MCNC: 0.7 MG/DL — SIGNIFICANT CHANGE UP (ref 0.7–1.5)
CRP SERPL-MCNC: 94.5 MG/L — HIGH
EGFR: 117 ML/MIN/1.73M2 — SIGNIFICANT CHANGE UP
EOSINOPHIL # BLD AUTO: 0.1 K/UL — SIGNIFICANT CHANGE UP (ref 0–0.7)
EOSINOPHIL NFR BLD AUTO: 0.6 % — SIGNIFICANT CHANGE UP (ref 0–8)
ERYTHROCYTE [SEDIMENTATION RATE] IN BLOOD: 68 MM/HR — HIGH (ref 0–20)
FERRITIN SERPL-MCNC: 162 NG/ML — HIGH (ref 15–150)
GLUCOSE SERPL-MCNC: 107 MG/DL — HIGH (ref 70–99)
HCT VFR BLD CALC: 30.8 % — LOW (ref 37–47)
HGB BLD-MCNC: 10.3 G/DL — LOW (ref 12–16)
IMM GRANULOCYTES NFR BLD AUTO: 0.4 % — HIGH (ref 0.1–0.3)
LYMPHOCYTES # BLD AUTO: 14.9 % — LOW (ref 20.5–51.1)
LYMPHOCYTES # BLD AUTO: 2.51 K/UL — SIGNIFICANT CHANGE UP (ref 1.2–3.4)
MAGNESIUM SERPL-MCNC: 2.1 MG/DL — SIGNIFICANT CHANGE UP (ref 1.8–2.4)
MCHC RBC-ENTMCNC: 26.4 PG — LOW (ref 27–31)
MCHC RBC-ENTMCNC: 33.4 G/DL — SIGNIFICANT CHANGE UP (ref 32–37)
MCV RBC AUTO: 79 FL — LOW (ref 81–99)
MONOCYTES # BLD AUTO: 0.74 K/UL — HIGH (ref 0.1–0.6)
MONOCYTES NFR BLD AUTO: 4.4 % — SIGNIFICANT CHANGE UP (ref 1.7–9.3)
NEUTROPHILS # BLD AUTO: 13.35 K/UL — HIGH (ref 1.4–6.5)
NEUTROPHILS NFR BLD AUTO: 79.5 % — HIGH (ref 42.2–75.2)
NRBC # BLD: 0 /100 WBCS — SIGNIFICANT CHANGE UP (ref 0–0)
PHOSPHATE SERPL-MCNC: 4.4 MG/DL — SIGNIFICANT CHANGE UP (ref 2.1–4.9)
PLATELET # BLD AUTO: 324 K/UL — SIGNIFICANT CHANGE UP (ref 130–400)
POTASSIUM SERPL-MCNC: 4.7 MMOL/L — SIGNIFICANT CHANGE UP (ref 3.5–5)
POTASSIUM SERPL-SCNC: 4.7 MMOL/L — SIGNIFICANT CHANGE UP (ref 3.5–5)
PROCALCITONIN SERPL-MCNC: 0.2 NG/ML — HIGH (ref 0.02–0.1)
PROT SERPL-MCNC: 7.3 G/DL — SIGNIFICANT CHANGE UP (ref 6–8)
RBC # BLD: 3.9 M/UL — LOW (ref 4.2–5.4)
RBC # FLD: 15 % — HIGH (ref 11.5–14.5)
SODIUM SERPL-SCNC: 136 MMOL/L — SIGNIFICANT CHANGE UP (ref 135–146)
WBC # BLD: 16.81 K/UL — HIGH (ref 4.8–10.8)
WBC # FLD AUTO: 16.81 K/UL — HIGH (ref 4.8–10.8)

## 2022-11-11 PROCEDURE — 99221 1ST HOSP IP/OBS SF/LOW 40: CPT

## 2022-11-11 PROCEDURE — 93320 DOPPLER ECHO COMPLETE: CPT | Mod: 26

## 2022-11-11 PROCEDURE — 93312 ECHO TRANSESOPHAGEAL: CPT | Mod: 26,XU

## 2022-11-11 PROCEDURE — 99222 1ST HOSP IP/OBS MODERATE 55: CPT

## 2022-11-11 PROCEDURE — 93325 DOPPLER ECHO COLOR FLOW MAPG: CPT | Mod: 26

## 2022-11-11 PROCEDURE — 99233 SBSQ HOSP IP/OBS HIGH 50: CPT

## 2022-11-11 RX ORDER — ACETAMINOPHEN 500 MG
650 TABLET ORAL EVERY 6 HOURS
Refills: 0 | Status: DISCONTINUED | OUTPATIENT
Start: 2022-11-11 | End: 2022-11-14

## 2022-11-11 RX ORDER — BENZOCAINE AND MENTHOL 5; 1 G/100ML; G/100ML
1 LIQUID ORAL THREE TIMES A DAY
Refills: 0 | Status: DISCONTINUED | OUTPATIENT
Start: 2022-11-11 | End: 2022-11-11

## 2022-11-11 RX ORDER — FUROSEMIDE 40 MG
40 TABLET ORAL DAILY
Refills: 0 | Status: DISCONTINUED | OUTPATIENT
Start: 2022-11-11 | End: 2022-11-13

## 2022-11-11 RX ORDER — BENZOCAINE 10 %
1 GEL (GRAM) MUCOUS MEMBRANE THREE TIMES A DAY
Refills: 0 | Status: DISCONTINUED | OUTPATIENT
Start: 2022-11-11 | End: 2022-11-14

## 2022-11-11 RX ORDER — INFLUENZA VIRUS VACCINE 15; 15; 15; 15 UG/.5ML; UG/.5ML; UG/.5ML; UG/.5ML
0.5 SUSPENSION INTRAMUSCULAR ONCE
Refills: 0 | Status: DISCONTINUED | OUTPATIENT
Start: 2022-11-11 | End: 2022-11-14

## 2022-11-11 RX ADMIN — Medication 1 SPRAY(S): at 22:45

## 2022-11-11 RX ADMIN — Medication 40 MILLIGRAM(S): at 11:25

## 2022-11-11 RX ADMIN — Medication 650 MILLIGRAM(S): at 22:21

## 2022-11-11 RX ADMIN — PANTOPRAZOLE SODIUM 40 MILLIGRAM(S): 20 TABLET, DELAYED RELEASE ORAL at 06:12

## 2022-11-11 NOTE — CONSULT NOTE ADULT - ASSESSMENT
31 yo F w PMH of congenital VSD (uncorrected) presented to the ED w chronic cough, SOB, hematemesis & hemoptysis.  Pt co chronic cough for the past 2 months, initially non-productive, now slightly productive associated with SOB. Initially she was able to climb one flight of stairs, but the SOB & cough have worsened over the past 3 weeks, now claims that she would be short of breath if she walked 5ft  She was seen by her PCP & pulmonologist for the cough & sob and was advised to use inhalers and azithromycin with no relief.  Yesterday night her cough worsened and also had an episode of vomiting, found 10-15 ml of blood mixed with the vomitus. Her cough episode later showed sputum w blood streaks. Claims that this was new, and never happened in the past 2 months.  Never smoked in life, FMHx NG for bleeding disorders, denies PND, orthopnea.  CTA PE: Small right pleural effusion + bibasilar interlobular septal thickening + diffuse hazy ground-glass appearance (slightly more focal in the LLL) 31 yo F w PMH of congenital VSD (uncorrected) presented to the ED w chronic cough, SOB, hematemesis & hemoptysis.  Pt co chronic cough for the past 2 months, initially non-productive, now slightly productive associated with SOB. Initially she was able to climb one flight of stairs, but the SOB & cough have worsened over the past 3 weeks, now claims that she would be short of breath if she walked 5ft  She was seen by her PCP & pulmonologist for the cough & sob and was advised to use inhalers and azithromycin with no relief.  Yesterday night her cough worsened and also had an episode of vomiting, found 10-15 ml of blood mixed with the vomitus. Her cough episode later showed sputum w blood streaks. Claims that this was new, and never happened in the past 2 months.  Never smoked in life, FMHx NG for bleeding disorders, denies PND, orthopnea.  CTA PE: Small right pleural effusion + bibasilar interlobular septal thickening + diffuse hazy ground-glass appearance (slightly more focal in the LLL)    	  IMPRESSION  VSD  Severe MR with flail anterior leaflet . Endocarditis in DDX  Sever TR  No bacterial PNA  Doubt pertussis ( has had 2 courses of Azithro with no change in cough )  Hemoptysis - resolved  Chronic cough whcih s=is not infectious in nature    RECOMMENDATIONS;  BCx x 4 over 24h  TAVO  No ABx for now

## 2022-11-11 NOTE — CONSULT NOTE ADULT - REASON FOR ADMISSION
hematemesis + hemoptysis + sob + chronic cough

## 2022-11-11 NOTE — ED ADULT NURSE NOTE - CHIEF COMPLAINT QUOTE
Pt states she has had a cough for 2+ months and following up with pulmonology and primary. Pt states tonight cough was so bad that she vomited and there was bright red blood in it. Pt states her throat and abdomen hurts.

## 2022-11-11 NOTE — PROGRESS NOTE ADULT - SUBJECTIVE AND OBJECTIVE BOX
ELBA TO  [facility] [phone] ([facility], [facility]- ER Hold)      Patient is a 32y old  Female who presents with a chief complaint of hematemesis + hemoptysis + sob + chronic cough (11 Nov 2022 11:11)        Interval events:  Patient seen and examined at bedside. Last night cough worsened and also had an episode of vomiting, found 10-15 ml of blood mixed with the vomitus. Her cough episode later showed sputum w blood streaks. Claims that this was new, and never happened in the past 2 months. Denies chest pain or fevers.     -PMHx: No pertinent past medical history    Murmur      -PSHx:        REVIEW OF SYSTEMS:  CONSTITUTIONAL: No fever, weight loss, or fatigue  RESPIRATORY: as above   CARDIOVASCULAR: No chest pain, palpitations, dizziness, or leg swelling  GASTROINTESTINAL: No abdominal or epigastric pain. No nausea, vomiting, or hematemesis; No diarrhea or constipation. No melena or hematochezia.  NEUROLOGICAL: No headaches  LYMPH NODES: No enlarged glands  MUSCULOSKELETAL: No joint pain or swelling; No muscle, back, or extremity pain      T(C): , Max: 37.4 (11-10-22 @ 15:56)  HR: 101 (11-11-22 @ 08:02)  BP: 102/59 (11-11-22 @ 08:02)  RR: 18 (11-11-22 @ 08:02)  SpO2: 95% (11-11-22 @ 08:02)  CAPILLARY BLOOD GLUCOSE          PHYSICAL EXAM:  General: Not in distress.   HEENT: EOMI, JVD  Cardio: regular, S1, S2, MICHELLE 4/6 left parasternal and apex radiating to axilla  Pulm: B/L BS.  No wheezing / crackles / rales  Abdomen: Soft, non-tender, non-distended. Normoactive bowel sounds  Extremities: No edema b/l le  Neuro: A&O x3. No focal deficits      LABS:          10.3  16.81  )-------(324          30.8  N=79.5  L=14.9  MCV=79.0          10.3  16.92  )-------(342          31.8  N=82.6  L=13.3  MCV=80.9    136|101|7<L>  ------------------<107<H>  4.7|20|0.7  eGFR:--  Ca:9.6  137|102|6<L>  ------------------<113<H>  4.5|21|0.6<L>  eGFR:--  Ca:9.5            Microbiology:      RADIOLOGY & ADDITIONAL TESTS:  < from: CT Angio Chest PE Protocol w/ IV Cont (11.10.22 @ 04:23) >    IMPRESSION:    No CTA evidence of acute pulmonary embolus.    Small right pleural effusion with predominantly bibasilar interlobular   septal thickening and diffuse hazy groundglass appearance of the lungs,   slightly more focal in the left lower lobe. Correlate for pulmonary edema   and/or CHF.    < end of copied text >    < from: TTE Echo Complete w/o Contrast w/ Doppler (11.10.22 @ 17:00) >  Summary:   1. Normal global left ventricular systolic function.   2. LV Ejection Fraction by Pop's Method with a biplane EF of 59 %.   3. Mildly enlarged right ventricle.   4. Mildly reduced RV systolic function.   5. Severe mitral valve regurgitation, likely acute due to flail anterior   leaflet.   6. Severe tricuspid regurgitation.   7. There is a supracristal VSD.    < end of copied text >        Medications:  furosemide   Injectable 40 milliGRAM(s) IV Push daily  pantoprazole    Tablet 40 milliGRAM(s) Oral before breakfast

## 2022-11-11 NOTE — CHART NOTE - NSCHARTNOTEFT_GEN_A_CORE
31 yo F w PMH of congenital VSD (uncorrected) presented to the ED w chronic cough, SOB, hematemesis & hemoptysis.Pt co chronic cough for the past 2 months, initially non-productive, now slightly productive associated with SOB. Initially she was able to climb one flight of stairs, but the SOB & cough have worsened over the past 3 weeks, now claims that she would be short of breath if she walked 5ft. She was seen by her PCP & pulmonologist for the cough & sob and was advised to use inhalers and azithromycin with no relief. Yesterday night her cough worsened and also had an episode of vomiting, found 10-15 ml of blood mixed with the vomitus. Her cough episode later showed sputum w blood streaks. Claims that this was new, and never happened in the past 2 months. Never smoked in life, FMHx NG for bleeding disorders, denies PND, orthopnea.    In the ED, , SpO2 97 on RA, labs: WBC 13.96, Hb 9.6 (baseline unknown), BNP 2247, trop <0.01, UA wnl/ EKG: Line Sinus tachycardia + Possible Left atrial enlargement. CTA PE: Small right pleural effusion +bibasilar interlobular septal thickening + diffuse hazy ground-glass appearance (slightly more focal in the LLL). Echo was done showed Severe mitral valve regurgitation, likely acute due to flail anterior   leaflet. She received 40mg lasix IV and will continue daily lasix IV 40 mg to keep her euvolemic.     ID were consulted for possible endocarditis, 4 blood cx were drawn, TAVO was ordered. Patient will stay off antibiotics for now.     Cariology and CT surgery following, patient is being transferred to .

## 2022-11-11 NOTE — PROGRESS NOTE ADULT - ASSESSMENT
31 yo F w PMH of congenital VSD (uncorrected) presented to the ED w chronic cough, SOB, hematemesis & hemoptysis.    #Severe MR with flail anterior leaflet - r/o endocarditis  #Severe TR  #Fluid overload severe valvular regurgitation  #VSD    #Hemoptysis - resolved  #Chronic cough   -transfer to   -cardiothoracic surgery eval appreciated  -c/w Lasix 40mg IV qd  -pending TAVO  -f/u BCx x4  -no abx for now  -per ID, can check for pertussis (but highly doubt as she took z-pack x2 outpatient and no improvement in cough)  -no abx for now  -CTSx recommending congenital heart disease specialist; f/u with cardio how to go about getting this, or if she needs to be transferred to another facility?    #Progress Note Handoff  Pending (specify):  TAVO, ctsx f/u, cardio f/u, transfer to , clinical improvement  Family discussion: Plan of care discussed with patient, aware and agreeable   Disposition: Acute

## 2022-11-11 NOTE — PATIENT PROFILE ADULT - CENTRAL VENOUS CATHETER/PICC LINE
What Type Of Note Output Would You Prefer (Optional)?: Bullet Format
How Severe Are Your Spot(S)?: mild
Have Your Spot(S) Been Treated In The Past?: has not been treated
Hpi Title: Evaluation of Skin Lesions
Additional History: Patient presents today for an annual total body skin exam and denies any specific areas of concern.
no

## 2022-11-11 NOTE — CHART NOTE - NSCHARTNOTEFT_GEN_A_CORE
POST OPERATIVE PROCEDURAL DOCUMENTATION    PRE-OP DIAGNOSIS: Valvulopathy     POST-OP DIAGNOSIS: MV     PROCEDURE: Transesophageal echocardiogram    Primary Physician: Dr. Byrne   Assistant: Dr. AGNES Posadas     ANESTHESIA TYPE  [  ] General Anesthesia  [ x ] Conscious Sedation  [  ] Local/Regional    CONDITION  [  ] Critical  [  ] Serious  [  ] Fair  [ x ] Good    SPECIMENS REMOVED (IF APPLICABLE): N/A    IMPLANTS (IF APPLICABLE): None    ESTIMATED BLOOD LOSS: None    COMPLICATIONS: None      FINDINGS:    After risks and benefits of procedures were explained, informed consent was obtained and placed in chart. Refer to Anesthesia note for sedation details.  The TAVO probe was passed into the esophagus without difficulty.  Transesophageal and transgastric images were obtained.  The TAVO probe was removed without difficulty and examined.  There was no evidence for bleeding.  The patient tolerated the procedure well without any immediate TAVO-related complications.        *****INCOMPLETE NOTE***********      Preliminary Findings:  LA:  TELMA: Left atrial appendage was clear of clot and smoke.  LV: LVEF was estimated at   MV: No evidence of MR, no evidence of MS.   AV: No evidence of AI, no evidence of AS.   RA:  TV: no TR.   PV: no PI.   IAS: no PFO. No R-> L shunt.   There was non atheroma seen in the thoracic aorta.     *****INCOMPLETE NOTE***********    DIAGNOSIS/IMPRESSION:

## 2022-11-11 NOTE — CHART NOTE - NSCHARTNOTEFT_GEN_A_CORE
POST OPERATIVE PROCEDURAL DOCUMENTATION    PRE-OP DIAGNOSIS: Valvulopathy     POST-OP DIAGNOSIS: Severe MR from flial anterior leaflet likely due to chord rupture.    PROCEDURE: Transesophageal echocardiogram    Primary Physician: Dr. ALICIA Byrne  Assistant: Dr. AGNES Posadas     ANESTHESIA TYPE  [  ] General Anesthesia  [ x ] Conscious Sedation  [  ] Local/Regional    CONDITION  [  ] Critical  [  ] Serious  [  ] Fair  [ x ] Good    SPECIMENS REMOVED (IF APPLICABLE): N/A    IMPLANTS (IF APPLICABLE): None    ESTIMATED BLOOD LOSS: None    COMPLICATIONS: None      FINDINGS:    After risks and benefits of procedures were explained, informed consent was obtained and placed in chart. Refer to Anesthesia note for sedation details.  The TAVO probe was passed into the esophagus without difficulty.  Transesophageal and transgastric images were obtained.  The TAVO probe was removed without difficulty and examined.  There was no evidence for bleeding.  The patient tolerated the procedure well without any immediate TAVO-related complications.      Preliminary Findings:  LA:   TELMA: Left atrial appendage was clear of clot and smoke.  LV: LVEF was estimated at   MV: No evidence of MR, no evidence of MS.   AV: No evidence of AI, no evidence of AS.   RA:  TV: no TR.   PV: no PI.   IAS: no PFO. No R-> L shunt.   There was mild, non-mobile atheroma seen in the thoracic aorta.     Patient successfully converted to sinus rhythm with synchronized  ___ J of direct current cardioversion.    DIAGNOSIS/IMPRESSION:    PLAN OF CARE: POST OPERATIVE PROCEDURAL DOCUMENTATION    PRE-OP DIAGNOSIS: Valvulopathy     POST-OP DIAGNOSIS: Severe MR from flial anterior leaflet likely due to chord rupture.    PROCEDURE: Transesophageal echocardiogram    Primary Physician: Dr. ALICIA Byrne  Assistant: Dr. AGNES Posadas     ANESTHESIA TYPE  [  ] General Anesthesia  [ x ] Conscious Sedation  [  ] Local/Regional    CONDITION  [  ] Critical  [  ] Serious  [  ] Fair  [ x ] Good    SPECIMENS REMOVED (IF APPLICABLE): N/A    IMPLANTS (IF APPLICABLE): None    ESTIMATED BLOOD LOSS: None    COMPLICATIONS: None      FINDINGS:    After risks and benefits of procedures were explained, informed consent was obtained and placed in chart. Refer to Anesthesia note for sedation details.  The TAVO probe was passed into the esophagus without difficulty.  Transesophageal and transgastric images were obtained.  The TAVO probe was removed without difficulty and examined.  There was no evidence for bleeding.  The patient tolerated the procedure well without any immediate TAVO-related complications.      Preliminary Findings:  LA: Mild to moderately enlarged  TELMA: Left atrial appendage was clear of clot and smoke.  LV: LVEF was estimated at 60%  MV: Severe mitral valve regurgitation. Flial anterior leaflet likely due to chord rupture.  AV: No evidence of AI, no evidence of AS.   RA: normal   TV: trace TR  PV: no PI.   IAS: intact   IVS: VSD with L --> R shunt  There was no atheroma seen in the thoracic aorta.     DIAGNOSIS/IMPRESSION: Severe MR from flial anterior leaflet likely due to chord rupture.

## 2022-11-11 NOTE — CONSULT NOTE ADULT - SUBJECTIVE AND OBJECTIVE BOX
Surgeon: Dr. Frederick/ Toribio    Consult requesting by: cardiology    HISTORY OF PRESENT ILLNESS:  33 yo F w PMH of congenital VSD (uncorrected) presented to the ED w chronic cough, SOB, and hemoptysis after vomiting and gagging..  Pt co chronic cough for the past 2 months, initially non-productive, now slightly productive associated with SOB. Initially she was able to climb one flight of stairs, but the SOB & cough have worsened over the past 3 weeks, now claims that she would be short of breath if she walked across the rooms.  She was seen by her PCP & pulmonologist for the cough & sob and was advised to use inhalers and azithromycin with no relief.  Yesterday night her cough worsened and also had an episode of vomiting, found 10-15 ml of blood mixed with the vomitus. Her cough episode later showed sputum w blood streaks. Claims that this was new, and never happened in the past 2 months.  Never smoked in life, FMHx NG for bleeding disorders, denies PND, orthopnea.  Patient denies IVDA, has tattoo's, over last week has required pillows, to almost the sitting position to sleep, has not been able to lie flat for about 2-3 weeks  She denies fever, recent diarrhea and dental work.    In the ED, , SpO2 97 on RA  labs: WBC 13.96, Hb 9.6 (baseline unknown), BNP 2247, trop <0.01, UA wnl  EKG: Line Sinus tachycardia + Possible Left atrial enlargement  CTA PE: Small right pleural effusion + bibasilar interlobular septal thickening + diffuse hazy ground-glass appearance (slightly more focal in the LLL)    TTE revealed normal EF with severe TR, MR and VSD - CTS called by attending cardiologist for recommendation    Home Medications:  none recently completed 2 courses of Z-pac    NYHA functional class    [ ] Class I (no limitation) [ ] Class II (slight limitation) [ X ] Class III (marked limitation) [ ] Class IV (symptoms at rest)    PAST MEDICAL & SURGICAL HISTORY:  Murmur / VSD      MEDICATIONS  (STANDING):  pantoprazole    Tablet 40 milliGRAM(s) Oral before breakfast    MEDICATIONS  (PRN):    Antiplatelet therapy:    non                       Last dose/amt:    Allergies    Allergy Status Unknown    Intolerances    SOCIAL HISTORY:  Smoker: no  ETOH use:  [ ] No  Ilicit Drug use:  [ ] No  Occupation:  Lives with: boy friend  Assisted device use: no    FAMILY HISTORY:  Family h/o multiple relatives diagnosed with VSD's as childern     Review of Systems  CONSTITUTIONAL: no fever, chills or night sweats]   NEURO:  denies seizures, paralysis or paresthesias                                                                                EYES: no glasses, no double vision, no blurry vision                                                                          ENMT: no difficulty hearing, vertigo, dysphagia, epistaxis recent dental work [ ]                                      CV:  denies chest pain,  + TINAJERO no palpatations at current time                                                                                            RESPIRATORY:  no cough + hemoptysis                                                                 GI:  no nausea,  + vomiting, constipation or diarrhea   : denies dysuria, hematuria, incontinence or retention                                                                                           MUSKULOSKELETAL:  denies joint swelling or muscle weakness  PSYCH:  denies dementia, depression, anxiety   ENDOCRINE:  cold intolerance[ ] heat intolerance[ ] polydipsia[ ]                                                                                                                                                                                                PHYSICAL EXAM  Vital Signs Last 24 Hrs  T(C): 37.4 (10 Nov 2022 15:56), Max: 37.4 (10 Nov 2022 15:56)  T(F): 99.3 (10 Nov 2022 15:56), Max: 99.3 (10 Nov 2022 15:56)  HR: 105 (10 Nov 2022 15:56) (98 - 115)  BP: 103/67 (10 Nov 2022 15:56) (103/67 - 127/60)  RR: 18 (10 Nov 2022 15:56) (16 - 18)  SpO2: 94% (10 Nov 2022 15:56) (94% - 99%)    Parameters below as of 10 Nov 2022 15:56  Patient On (Oxygen Delivery Method): room air      CONSTITUTIONAL:    well nourished, well developed,  in NAD                                                                       Neuro: oriented to person/place & time with no focal motor or sensory  deficits     Eyes: PERRLA, EOMI, no nystagmus, sclera anicteric  ENT:  nasal/oral mucosa with absence of cyanosis, fair dentition  Neck: no jugular vein distention, trachea midline, no goiter   Chest: bilateral breath sounds with good air movement. scattered  rales,, decreased right base                                                                          CV:  RTR, S1S2, ++++ significant murmur appreciated anterior and posterior chest  Carotids: ? Bruits may be referred murmur  GI:  soft, non-tender non-distended, + bowel sounds                                                                                                          Extremities:  no evidence of cyanosis or deformity, no pedal edema   Extremity Pulses: right / left:  DP's 2+/2+; radials 2+/2+    SKIN : no rashes                                                          LABS:                        10.3   16.92 )-----------( 342      ( 10 Nov 2022 16:54 )             31.8     11-10    137  |  102  |  6<L>  ----------------------------<  113<H>  4.5   |  21  |  0.6<L>    Ca    9.5      10 Nov 2022 16:54    TPro  7.5  /  Alb  3.7  /  TBili  1.1  /  DBili  x   /  AST  34  /  ALT  26  /  AlkPhos  148<H>  11-10      Urinalysis Basic - ( 10 Nov 2022 10:44 )    Color: Yellow / Appearance: Clear / S.028 / pH: x  Gluc: x / Ketone: Negative  / Bili: Negative / Urobili: <2 mg/dL   Blood: x / Protein: 30 mg/dL / Nitrite: Negative   Leuk Esterase: Negative / RBC: 1 /HPF / WBC 1 /HPF   Sq Epi: x / Non Sq Epi: 2 /HPF / Bacteria: Negative      CARDIAC MARKERS ( 10 Nov 2022 16:54 )  x     / <0.01 ng/mL / x     / x     / <1.0 ng/mL  CARDIAC MARKERS ( 10 Nov 2022 12:26 )  x     / <0.01 ng/mL / x     / x     / x      CARDIAC MARKERS ( 10 Nov 2022 04:03 )  x     / <0.01 ng/mL / x     / x     / x        COVID-19: NotDetec (11-10-22 @ 05:15)    TTE / TAVO: < from: TTE Echo Complete w/o Contrast w/ Doppler (11.10.22 @ 17:00) >   1. Normal global left ventricular systolic function.   2. LV Ejection Fraction by Pop's Method with a biplane EF of 59 %.   3. Mildly enlarged right ventricle.   4. Mildly reduced RV systolic function.   5. Severe mitral valve regurgitation, likely acute due to flail anterior   leaflet.   6. Severe tricuspid regurgitation.   7. There is a supracristal VSD.       CT Angio Chest PE Protocol w/ IV Cont (11.10.22 @ 04:23) >    No CTA evidence of acute pulmonary embolus.    Small right pleural effusion with predominantly bibasilar interlobular   septal thickening and diffuse hazy groundglass appearance of the lungs,   slightly more focal in the left lower lobe. Correlate for pulmonary edema   and/or CHF.        Assessment/ Plan:  33 yo female with known uncorrected congenital VSD has been progressively getting sicker with symptoms of dyspnea and cough over last 2 months, rapidly progressing over last week.  Patient found to have severe MR, severe TR, VSD, small pleural effusion right and pulmonary edema. CT scan demonstrates pulmonary artery twice the size of the aorta.    Acute heart failure - diastolic 2/2 degenerative valve disease -   Need to r/o bacterial endocarditis  Recommend Adult congential heart specialist evaluation  May need Cardiac MR and TAVO - surgeon will discuss with cardiology  Recommend ID consult  Cardiology evaluation  No acute surgical intervention at current time but when medically optimized will need surgical intervention  Attending reviewed TTE and CT  Attending note to follow                    
  ELBA TO  32y, Female  Allergy: Allergy Status Unknown      All historical available data reviewed.    HPI:  31 yo F w PMH of congenital VSD (uncorrected) presented to the ED w chronic cough, SOB, hematemesis & hemoptysis.  Pt co chronic cough for the past 2 months, initially non-productive, now slightly productive associated with SOB. Initially she was able to climb one flight of stairs, but the SOB & cough have worsened over the past 3 weeks, now claims that she would be short of breath if she walked 5ft  She was seen by her PCP & pulmonologist for the cough & sob and was advised to use inhalers and azithromycin with no relief.  Yesterday night her cough worsened and also had an episode of vomiting, found 10-15 ml of blood mixed with the vomitus. Her cough episode later showed sputum w blood streaks. Claims that this was new, and never happened in the past 2 months.  Never smoked in life, FMHx NG for bleeding disorders, denies PND, orthopnea.    In the ED, , SpO2 97 on RA  labs: WBC 13.96, Hb 9.6 (baseline unknown), BNP 2247, trop <0.01, UA wnl  EKG: Line Sinus tachycardia + Possible Left atrial enlargement  CTA PE: Small right pleural effusion + bibasilar interlobular septal thickening + diffuse hazy ground-glass appearance (slightly more focal in the LLL)    received 40mg lasix IV in the ED     (10 Nov 2022 09:51)    FAMILY HISTORY:    PAST MEDICAL & SURGICAL HISTORY:  Murmur            VITALS:  T(F): 98.5, Max: 99.3 (11-10-22 @ 15:56)  HR: 101  BP: 102/59  RR: 18Vital Signs Last 24 Hrs  T(C): 36.9 (2022 08:02), Max: 37.4 (10 Nov 2022 15:56)  T(F): 98.5 (2022 08:02), Max: 99.3 (10 Nov 2022 15:56)  HR: 101 (2022 08:02) (101 - 110)  BP: 102/59 (2022 08:02) (102/59 - 110/58)  BP(mean): --  RR: 18 (2022 08:02) (18 - 18)  SpO2: 95% (2022 08:02) (94% - 95%)    Parameters below as of 2022 00:02  Patient On (Oxygen Delivery Method): room air        TESTS & MEASUREMENTS:                        10.3   16.81 )-----------( 324      ( 2022 08:11 )             30.8     11-10    137  |  102  |  6<L>  ----------------------------<  113<H>  4.5   |  21  |  0.6<L>    Ca    9.5      10 Nov 2022 16:54    TPro  7.5  /  Alb  3.7  /  TBili  1.1  /  DBili  x   /  AST  34  /  ALT  26  /  AlkPhos  148<H>  1110    LIVER FUNCTIONS - ( 10 Nov 2022 16:54 )  Alb: 3.7 g/dL / Pro: 7.5 g/dL / ALK PHOS: 148 U/L / ALT: 26 U/L / AST: 34 U/L / GGT: x             Urinalysis Basic - ( 10 Nov 2022 10:44 )    Color: Yellow / Appearance: Clear / S.028 / pH: x  Gluc: x / Ketone: Negative  / Bili: Negative / Urobili: <2 mg/dL   Blood: x / Protein: 30 mg/dL / Nitrite: Negative   Leuk Esterase: Negative / RBC: 1 /HPF / WBC 1 /HPF   Sq Epi: x / Non Sq Epi: 2 /HPF / Bacteria: Negative          RADIOLOGY & ADDITIONAL TESTS:  Personal review of radiological diagnostics performed  Echo and EKG results noted when applicable.     MEDICATIONS:  furosemide   Injectable 40 milliGRAM(s) IV Push daily  pantoprazole    Tablet 40 milliGRAM(s) Oral before breakfast      ANTIBIOTICS:    
HPI:  31 yo F w PMH of congenital VSD (uncorrected) presented to the ED w chronic cough, SOB, hematemesis & hemoptysis.  Pt co chronic cough for the past 2 months, initially non-productive, now slightly productive associated with SOB. Initially she was able to climb one flight of stairs, but the SOB & cough have worsened over the past 3 weeks, now claims that she would be short of breath if she walked 5ft  She was seen by her PCP & pulmonologist for the cough & sob and was advised to use inhalers and azithromycin with no relief.  Yesterday night her cough worsened and also had an episode of vomiting, found 10-15 ml of blood mixed with the vomitus. Her cough episode later showed sputum w blood streaks. Claims that this was new, and never happened in the past 2 months.  Never smoked in life, FMHx NG for bleeding disorders, denies PND, orthopnea.    In the ED, , SpO2 97 on RA  labs: WBC 13.96, Hb 9.6 (baseline unknown), BNP 2247, trop <0.01, UA wnl  EKG: Line Sinus tachycardia + Possible Left atrial enlargement  CTA PE: Small right pleural effusion + bibasilar interlobular septal thickening + diffuse hazy ground-glass appearance (slightly more focal in the LLL)    received 40mg lasix IV in the ED     (10 Nov 2022 09:51)      ---  cardio fellow additional notes:    Pt with prior hx of congenital VSD unrepaired presented to the hospital for chronic dry cough with 1 episode of hemoptysis. Per pt it was the size of a coin and she had another small episode where she brought up blood tinged sputum. She feels irritation from chronic cough however otherwise denied any chest pain, sore throat, fever/chills. Pt does endorse SOB/TINAJERO which has been going on with the cough for the last few weeks. Went to see her PMD got treatment for suspected Asthma. Here in the hospital w/up showed severe MR with flail leaflet. Cardio consulted for further recs.     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)      ALLERGIES:  Allergy Status Unknown      MEDICATIONS:  pantoprazole    Tablet 40 milliGRAM(s) Oral before breakfast    PRN:      HOME MEDICATIONS:  Home Medications:      VITALS:   T(F): 99.3 (11-10 @ 15:56), Max: 99.3 (11-10 @ 15:56)  HR: 105 (11-10 @ 15:56) (98 - 115)  BP: 103/67 (11-10 @ 15:56) (103/67 - 127/60)  BP(mean): --  RR: 18 (11-10 @ 15:56) (16 - 18)  SpO2: 94% (11-10 @ 15:56) (94% - 99%)    I&O's Summary      REVIEW OF SYSTEMS:  CONSTITUTIONAL: No weakness, fevers or chills  HEENT: No visual changes, neck/ear pain  RESPIRATORY: see HPI  CARDIOVASCULAR: See HPI  GASTROINTESTINAL: No abdominal pain. No nausea, vomiting, diarrhea   GENITOURINARY: No dysuria, frequency or hematuria  NEUROLOGICAL: No new focal deficits  SKIN: No new rashes    PHYSICAL EXAM:  General: Not in distress.   HEENT: EOMI, JVD  Cardio: regular, S1, S2, MICHELLE 4/6 left parasternal and apex radiating to axilla  Pulm: B/L BS.  No wheezing / crackles / rales  Abdomen: Soft, non-tender, non-distended. Normoactive bowel sounds  Extremities: No edema b/l le  Neuro: A&O x3. No focal deficits    LABS:                        10.3   16.92 )-----------( 342      ( 10 Nov 2022 16:54 )             31.8     11-10    137  |  102  |  6<L>  ----------------------------<  113<H>  4.5   |  21  |  0.6<L>    Ca    9.5      10 Nov 2022 16:54    TPro  7.5  /  Alb  3.7  /  TBili  1.1  /  DBili  x   /  AST  34  /  ALT  26  /  AlkPhos  148<H>  11-10      Troponin T, Serum: <0.01 ng/mL (11-10-22 @ 20:15)  Troponin T, Serum: <0.01 ng/mL (11-10-22 @ 16:54)  Troponin T, Serum: <0.01 ng/mL (11-10-22 @ 12:26)  Troponin T, Serum: <0.01 ng/mL (11-10-22 @ 04:03)    CARDIAC MARKERS ( 10 Nov 2022 20:15 )  x     / <0.01 ng/mL / x     / x     / x      CARDIAC MARKERS ( 10 Nov 2022 16:54 )  x     / <0.01 ng/mL / x     / x     / <1.0 ng/mL  CARDIAC MARKERS ( 10 Nov 2022 12:26 )  x     / <0.01 ng/mL / x     / x     / x      CARDIAC MARKERS ( 10 Nov 2022 04:03 )  x     / <0.01 ng/mL / x     / x     / x            Troponin trend:    Serum Pro-Brain Natriuretic Peptide: 2247 pg/mL (11-10-22 @ 04:03)      COVID-19 PCR: NotDetec (10 Nov 2022 05:15)      RADIOLOGY:  < from: CT Angio Chest PE Protocol w/ IV Cont (11.10.22 @ 04:23) >      IMPRESSION:    No CTA evidence of acute pulmonary embolus.    Small right pleural effusion with predominantly bibasilar interlobular   septal thickening and diffuse hazy groundglass appearance of the lungs,   slightly more focal in the left lower lobe. Correlate for pulmonary edema   and/or CHF.    < end of copied text >    -TTE: < from: TTE Echo Complete w/o Contrast w/ Doppler (11.10.22 @ 17:00) >  Summary:   1. Normal global left ventricular systolic function.   2. LV Ejection Fraction by Pop's Method with a biplane EF of 59 %.   3. Mildly enlarged right ventricle.   4. Mildly reduced RV systolic function.   5. Severe mitral valve regurgitation, likely acute due to flail anterior   leaflet.   6. Severe tricuspid regurgitation.   7. There is a supracristal VSD.    < end of copied text >    -OTHER:  EC Lead ECG:   Ventricular Rate 114 BPM    Atrial Rate 114 BPM    P-R Interval 148 ms    QRS Duration 68 ms    Q-T Interval 334 ms    QTC Calculation(Bazett) 460 ms    P Axis 74 degrees    R Axis 62 degrees    T Axis 63 degrees    Diagnosis Line Sinus tachycardia  Possible Left atrial enlargement  Borderline ECG    Confirmed by elina oliver ([id number]) on 11/10/2022 8:34:45 AM (11-10 @ 05:30)      TELEMETRY EVENTS:  None

## 2022-11-11 NOTE — PRE-ANESTHESIA EVALUATION ADULT - NSANTHOSAYNRD_GEN_A_CORE
No. ALIYA screening performed.  STOP BANG Legend: 0-2 = LOW Risk; 3-4 = INTERMEDIATE Risk; 5-8 = HIGH Risk

## 2022-11-12 LAB
-  BLOOD PCR PANEL: SIGNIFICANT CHANGE UP
ALBUMIN SERPL ELPH-MCNC: 3.8 G/DL — SIGNIFICANT CHANGE UP (ref 3.5–5.2)
ALP SERPL-CCNC: 146 U/L — HIGH (ref 30–115)
ALT FLD-CCNC: 25 U/L — SIGNIFICANT CHANGE UP (ref 0–41)
ANION GAP SERPL CALC-SCNC: 13 MMOL/L — SIGNIFICANT CHANGE UP (ref 7–14)
ANION GAP SERPL CALC-SCNC: 14 MMOL/L — SIGNIFICANT CHANGE UP (ref 7–14)
AST SERPL-CCNC: 21 U/L — SIGNIFICANT CHANGE UP (ref 0–41)
B PERT DNA SPEC QL NAA+PROBE: SIGNIFICANT CHANGE UP
B PERT+PARAPERT DNA PNL NPH: SIGNIFICANT CHANGE UP
BASOPHILS # BLD AUTO: 0.04 K/UL — SIGNIFICANT CHANGE UP (ref 0–0.2)
BASOPHILS NFR BLD AUTO: 0.2 % — SIGNIFICANT CHANGE UP (ref 0–1)
BILIRUB SERPL-MCNC: 1.5 MG/DL — HIGH (ref 0.2–1.2)
BUN SERPL-MCNC: 10 MG/DL — SIGNIFICANT CHANGE UP (ref 10–20)
BUN SERPL-MCNC: 10 MG/DL — SIGNIFICANT CHANGE UP (ref 10–20)
CALCIUM SERPL-MCNC: 9.1 MG/DL — SIGNIFICANT CHANGE UP (ref 8.4–10.4)
CALCIUM SERPL-MCNC: 9.2 MG/DL — SIGNIFICANT CHANGE UP (ref 8.4–10.4)
CHLORIDE SERPL-SCNC: 96 MMOL/L — LOW (ref 98–110)
CHLORIDE SERPL-SCNC: 96 MMOL/L — LOW (ref 98–110)
CO2 SERPL-SCNC: 22 MMOL/L — SIGNIFICANT CHANGE UP (ref 17–32)
CO2 SERPL-SCNC: 23 MMOL/L — SIGNIFICANT CHANGE UP (ref 17–32)
CREAT SERPL-MCNC: 0.6 MG/DL — LOW (ref 0.7–1.5)
CREAT SERPL-MCNC: 0.6 MG/DL — LOW (ref 0.7–1.5)
CULTURE RESULTS: SIGNIFICANT CHANGE UP
EGFR: 121 ML/MIN/1.73M2 — SIGNIFICANT CHANGE UP
EGFR: 121 ML/MIN/1.73M2 — SIGNIFICANT CHANGE UP
EOSINOPHIL # BLD AUTO: 0.15 K/UL — SIGNIFICANT CHANGE UP (ref 0–0.7)
EOSINOPHIL NFR BLD AUTO: 0.9 % — SIGNIFICANT CHANGE UP (ref 0–8)
GLUCOSE SERPL-MCNC: 115 MG/DL — HIGH (ref 70–99)
GLUCOSE SERPL-MCNC: 115 MG/DL — HIGH (ref 70–99)
GRAM STN FLD: SIGNIFICANT CHANGE UP
GRAM STN FLD: SIGNIFICANT CHANGE UP
HCT VFR BLD CALC: 31.4 % — LOW (ref 37–47)
HCT VFR BLD CALC: 32.1 % — LOW (ref 37–47)
HGB BLD-MCNC: 10.4 G/DL — LOW (ref 12–16)
HGB BLD-MCNC: 10.5 G/DL — LOW (ref 12–16)
IMM GRANULOCYTES NFR BLD AUTO: 0.5 % — HIGH (ref 0.1–0.3)
LYMPHOCYTES # BLD AUTO: 12.2 % — LOW (ref 20.5–51.1)
LYMPHOCYTES # BLD AUTO: 2.05 K/UL — SIGNIFICANT CHANGE UP (ref 1.2–3.4)
MAGNESIUM SERPL-MCNC: 1.9 MG/DL — SIGNIFICANT CHANGE UP (ref 1.8–2.4)
MCHC RBC-ENTMCNC: 26.1 PG — LOW (ref 27–31)
MCHC RBC-ENTMCNC: 26.3 PG — LOW (ref 27–31)
MCHC RBC-ENTMCNC: 32.7 G/DL — SIGNIFICANT CHANGE UP (ref 32–37)
MCHC RBC-ENTMCNC: 33.1 G/DL — SIGNIFICANT CHANGE UP (ref 32–37)
MCV RBC AUTO: 79.5 FL — LOW (ref 81–99)
MCV RBC AUTO: 79.9 FL — LOW (ref 81–99)
METHOD TYPE: SIGNIFICANT CHANGE UP
MONOCYTES # BLD AUTO: 0.76 K/UL — HIGH (ref 0.1–0.6)
MONOCYTES NFR BLD AUTO: 4.5 % — SIGNIFICANT CHANGE UP (ref 1.7–9.3)
NEUTROPHILS # BLD AUTO: 13.68 K/UL — HIGH (ref 1.4–6.5)
NEUTROPHILS NFR BLD AUTO: 81.7 % — HIGH (ref 42.2–75.2)
NRBC # BLD: 0 /100 WBCS — SIGNIFICANT CHANGE UP (ref 0–0)
NRBC # BLD: 0 /100 WBCS — SIGNIFICANT CHANGE UP (ref 0–0)
PHOSPHATE SERPL-MCNC: 4.4 MG/DL — SIGNIFICANT CHANGE UP (ref 2.1–4.9)
PLATELET # BLD AUTO: 349 K/UL — SIGNIFICANT CHANGE UP (ref 130–400)
PLATELET # BLD AUTO: 354 K/UL — SIGNIFICANT CHANGE UP (ref 130–400)
POTASSIUM SERPL-MCNC: 3.6 MMOL/L — SIGNIFICANT CHANGE UP (ref 3.5–5)
POTASSIUM SERPL-MCNC: 3.6 MMOL/L — SIGNIFICANT CHANGE UP (ref 3.5–5)
POTASSIUM SERPL-SCNC: 3.6 MMOL/L — SIGNIFICANT CHANGE UP (ref 3.5–5)
POTASSIUM SERPL-SCNC: 3.6 MMOL/L — SIGNIFICANT CHANGE UP (ref 3.5–5)
PROT SERPL-MCNC: 7.5 G/DL — SIGNIFICANT CHANGE UP (ref 6–8)
RAPID RVP RESULT: DETECTED
RBC # BLD: 3.95 M/UL — LOW (ref 4.2–5.4)
RBC # BLD: 4.02 M/UL — LOW (ref 4.2–5.4)
RBC # FLD: 14.6 % — HIGH (ref 11.5–14.5)
RBC # FLD: 14.6 % — HIGH (ref 11.5–14.5)
RV+EV RNA SPEC QL NAA+PROBE: DETECTED
SARS-COV-2 RNA SPEC QL NAA+PROBE: SIGNIFICANT CHANGE UP
SODIUM SERPL-SCNC: 132 MMOL/L — LOW (ref 135–146)
SODIUM SERPL-SCNC: 132 MMOL/L — LOW (ref 135–146)
SPECIMEN SOURCE: SIGNIFICANT CHANGE UP
WBC # BLD: 16.76 K/UL — HIGH (ref 4.8–10.8)
WBC # BLD: 17.06 K/UL — HIGH (ref 4.8–10.8)
WBC # FLD AUTO: 16.76 K/UL — HIGH (ref 4.8–10.8)
WBC # FLD AUTO: 17.06 K/UL — HIGH (ref 4.8–10.8)

## 2022-11-12 PROCEDURE — 99233 SBSQ HOSP IP/OBS HIGH 50: CPT

## 2022-11-12 RX ORDER — VANCOMYCIN HCL 1 G
1000 VIAL (EA) INTRAVENOUS EVERY 12 HOURS
Refills: 0 | Status: DISCONTINUED | OUTPATIENT
Start: 2022-11-12 | End: 2022-11-14

## 2022-11-12 RX ORDER — VANCOMYCIN HCL 1 G
1 VIAL (EA) INTRAVENOUS EVERY 12 HOURS
Refills: 0 | Status: DISCONTINUED | OUTPATIENT
Start: 2022-11-12 | End: 2022-11-12

## 2022-11-12 RX ORDER — ONDANSETRON 8 MG/1
4 TABLET, FILM COATED ORAL ONCE
Refills: 0 | Status: COMPLETED | OUTPATIENT
Start: 2022-11-12 | End: 2022-11-13

## 2022-11-12 RX ORDER — GUAIFENESIN/DEXTROMETHORPHAN 600MG-30MG
10 TABLET, EXTENDED RELEASE 12 HR ORAL EVERY 4 HOURS
Refills: 0 | Status: DISCONTINUED | OUTPATIENT
Start: 2022-11-12 | End: 2022-11-14

## 2022-11-12 RX ORDER — GUAIFENESIN/DEXTROMETHORPHAN 600MG-30MG
10 TABLET, EXTENDED RELEASE 12 HR ORAL EVERY 4 HOURS
Refills: 0 | Status: DISCONTINUED | OUTPATIENT
Start: 2022-11-12 | End: 2022-11-12

## 2022-11-12 RX ORDER — SODIUM CHLORIDE 0.65 %
1 AEROSOL, SPRAY (ML) NASAL THREE TIMES A DAY
Refills: 0 | Status: DISCONTINUED | OUTPATIENT
Start: 2022-11-12 | End: 2022-11-14

## 2022-11-12 RX ADMIN — Medication 650 MILLIGRAM(S): at 18:28

## 2022-11-12 RX ADMIN — Medication 650 MILLIGRAM(S): at 17:52

## 2022-11-12 RX ADMIN — PANTOPRAZOLE SODIUM 40 MILLIGRAM(S): 20 TABLET, DELAYED RELEASE ORAL at 05:20

## 2022-11-12 RX ADMIN — Medication 250 MILLIGRAM(S): at 17:47

## 2022-11-12 RX ADMIN — Medication 650 MILLIGRAM(S): at 00:29

## 2022-11-12 RX ADMIN — Medication 40 MILLIGRAM(S): at 05:19

## 2022-11-12 RX ADMIN — Medication 1 SPRAY(S): at 05:19

## 2022-11-12 NOTE — PROGRESS NOTE ADULT - ASSESSMENT
31 yo F w PMH of congenital VSD (uncorrected) presented to the ED w chronic cough, SOB, hematemesis & hemoptysis.  Pt co chronic cough for the past 2 months, initially non-productive, now slightly productive associated with SOB. Initially she was able to climb one flight of stairs, but the SOB & cough have worsened over the past 3 weeks, now claims that she would be short of breath if she walked 5ft  She was seen by her PCP & pulmonologist for the cough & sob and was advised to use inhalers and azithromycin with no relief.  Yesterday night her cough worsened and also had an episode of vomiting, found 10-15 ml of blood mixed with the vomitus. Her cough episode later showed sputum w blood streaks. Claims that this was new, and never happened in the past 2 months.  Never smoked in life, FMHx NG for bleeding disorders, denies PND, orthopnea.  CTA PE: Small right pleural effusion + bibasilar interlobular septal thickening + diffuse hazy ground-glass appearance (slightly more focal in the LLL)    	  IMPRESSION  GP pairs anaerobic bottle not ID on PCR  VSD  Severe MR with flail anterior leaflet . Endocarditis in DDX- TAVO Severe MR from flial anterior leaflet likely due to chord rupture.  Severe TR  No bacterial PNA  Doubt pertussis ( has had 2 courses of Azithro with no change in cough )  Hemoptysis - resolved  Chronic cough which is not infectious in nature    RECOMMENDATIONS;  - Repeat another set of BCX  - THEN, Vanc 1g q12h IV  - Please check vanc trough 30 min prior to 4th dose. Goal trough 15-20. If trough results > 20, please HOLD further Vanco dosing and order AM Random Vanco level   - f/u GP ID

## 2022-11-12 NOTE — PROGRESS NOTE ADULT - ASSESSMENT
Assessment: 33 yo F w PMH of congenital VSD (uncorrected) presented to the ED w chronic cough, SOB, hematemesis & hemoptysis 11/10, found to have severe mitral regurgitation and VSD with left to right shunting on TAVO. Now admitted to  for optimization prior to mitral valve and VSD repair.       Problems discussed and associated plan:    #Gram +cocci on BC  - repeat blood culture  - Start vanco 1gm q12h once BC drawn    #Severe MR with flail anterior leaflet   - VSD/MVR once medically optimized    #New onset CHF, BNP 2247   -Lasix 40mg IVP daily  -daily standing weights  - strict I&O    #Hemoptysis - resolved    #Chronic cough/persistent nausea  - guaifenesin DM prn  - zofran 4mg IVP prn              Please contact me with any questions or concerns at [phone].

## 2022-11-12 NOTE — PROGRESS NOTE ADULT - SUBJECTIVE AND OBJECTIVE BOX
Chief complaint: Patient is a 32y old  Female who presents with a chief complaint of hematemesis + hemoptysis + sob + chronic cough (2022 14:26)    Interval history: Febrile overnight .101.3       Review of systems: A complete 10-point review of systems was obtained and is negative except as stated in the interval history.    Vitals:  T(F): 97.8, Max: 101.1 ( @ 16:16)  HR: 96 (93 - 118)  BP: 97/62 (90/54 - 132/70)  RR: 17 (17 - 189)  SpO2: 98% (95% - 98%)    Ins & outs:      @ 07:01  -   @ 06:53  --------------------------------------------------------  IN: 60 mL / OUT: 400 mL / NET: -340 mL      Weight trend:  Weight (kg): 68.3 ()    Physical exam:  General: No apparent distress  HEENT: Anicteric sclera. Moist mucous membranes. JVP *** cm.   Cardiac: Regular rate and rhythm. No murmurs, rubs, or gallops.   Vascular: Symmetric radial pulses. Dorsalis pedis pulses palpable.   Respiratory: Normal effort. Bibasilar crackles. Clear to ascultation.   Abdomen: Soft, nontender. Audible bowel sounds.   Extremities: Warm with *** edema. No cyanosis or clubbing.   Skin: Warm and dry. No rash.   Neurologic: Grossly normal motor function.   Psychiatric: Oriented to person, place, and time.     Data reviewed:  - Telemetry:   - ECG (date***):   -   TAVO (date22     ACC: [id number] EXAM:  ECHO TAVO W DOPP COLOR FLOW#                          PROCEDURE DATE:  2022          INTERPRETATION:   [place] [address]                Phone: [phone].   TRANSESOPHAGEAL ECHOCARDIOGRAM REPORT        Patient Name:   ELBA TO Accession #: [id number]  Medical Rec #:  [id number]  Height:      63.0 in 160.0 cm  YOB: 1990  Weight:      150.0 lb 68.04 kg  Patient Age:    32 years   BSA:         1.71 m²  Patient Gender: F          BP:          110/71 mmHg      Date of Exam:        2022 4:20:44 PM  Referring Physician: Roger  Sonographer:         Leslie Lindsey  Fellow:              Giorgi Posadas    Reading Physician:   Joe Byrne.    Procedure:   Transesophageal Echocardiogram.  Indications: I42.9 - Cardiomyopathy, unspecified  Diagnosis:   Cardiomyopathy, unspecified - I42.9        Summary:   1. Left ventricular ejection fraction, by visual estimation, is 60 to   65%.   2. Severe primary mitral valve regurgitation due to flail of the A2   leaflet, likely due to ruptured chordae. There appears to be flail of   P2/P3 as well.   3. There is an outlet VSD visualized with left to right shunting.   4. Mild to moderately enlarged left atrium.   5. No left atrial appendage thrombus.      PROCEDURE: After discussion of the risks and benefits of the TAVO, an   informed consent was obtained. Intravenous sedation was performed by   anesthesia. The TAVO probe was passed by the fellow without difficulty.   Images were obtained with the patient in a left lateral decubitus   position. The patient's vital signs; including heart rate, blood   pressure, and oxygen saturation; remained stable throughout the  procedure. The patient tolerated the procedure well and without   complications.    PHYSICIAN INTERPRETATION:  Left Ventricle: Normal left ventricular size and wall thicknesses, with   normal systolic and diastolic function. Left ventricular ejection   fraction, by visual estimation, is 60 to 65%.  Right Ventricle: Normal right ventricular size and function.  Left Atrium: Mild to moderately enlarged left atrium. No left atrial   appendage thrombus is seen.  Mitral Valve: No evidence of mitral valve stenosis. Severe mitral valve   regurgitation is seen.  Tricuspid Valve: Trivial tricuspid regurgitation is visualized.  Aortic Valve: Normal trileaflet aortic valve with normal opening. The   aortic valve is trileaflet. No aortic stenosis. No evidence of aortic   valve regurgitation is seen.  Pulmonic Valve: The pulmonic valve was not well visualized. No indication   of pulmonic valve regurgitation.  Aorta: The aortic root is normal in size and structure.  Shunts: There is no evidence of a patent foramen ovale. There is no   evidence of any atrial septal defect.  SPECTRAL DOPPLER ANALYSIS:    [id number] Joe Byrne, Electronically signed on 2022 at 6:28:26 PM            *** Final ***  ***):   - Chest x-ray (date***):   - Stress test:   - CCTA:  - Cardiac catheterization:  - Cardiac MRI:    - Labs:                        10.3   16.81 )-----------( 324      ( 2022 08:11 )             30.8         136  |  101  |  7<L>  ----------------------------<  107<H>  4.7   |  20  |  0.7    Ca    9.6      2022 08:11  Phos  4.4       Mg     2.1         TPro  7.3  /  Alb  3.8  /  TBili  1.1  /  DBili  x   /  AST  38  /  ALT  34  /  AlkPhos  147<H>        Troponin T, Serum: <0.01 ng/mL (11-10-22 @ 20:15)  Troponin T, Serum: <0.01 ng/mL (11-10-22 @ 16:54)  Troponin T, Serum: <0.01 ng/mL (11-10-22 @ 12:26)  Troponin T, Serum: <0.01 ng/mL (11-10-22 @ 04:03)    Serum Pro-Brain Natriuretic Peptide: 2247 pg/mL (11-10)          Urinalysis Basic - ( 10 Nov 2022 10:44 )    Color: Yellow / Appearance: Clear / S.028 / pH: x  Gluc: x / Ketone: Negative  / Bili: Negative / Urobili: <2 mg/dL   Blood: x / Protein: 30 mg/dL / Nitrite: Negative   Leuk Esterase: Negative / RBC: 1 /HPF / WBC 1 /HPF   Sq Epi: x / Non Sq Epi: 2 /HPF / Bacteria: Negative        Medications:  benzocaine 20% Spray 1 Spray(s) Topical three times a day  furosemide   Injectable 40 milliGRAM(s) IV Push daily  influenza   Vaccine 0.5 milliLiter(s) IntraMuscular once  pantoprazole    Tablet 40 milliGRAM(s) Oral before breakfast    Drips:    PRN:     Allergies    Allergy Status Unknown    Intolerances      Assessment: 31y/o female with severe Mitral regurgitaton and VSD with left to right shunt.        Problems discussed and associated plan:    r/o ENdocarditis  f/u blood cultures      Please contact me with any questions or concerns at [phone]. Chief complaint: Patient is a 32y old  Female who presents with a chief complaint of hematemesis + hemoptysis + sob + chronic cough (2022 14:26)    Interval history: Febrile overnight .100.9, with c/o nausea and nonproductive cough.        Review of systems: A complete 10-point review of systems was obtained and is negative except as stated in the interval history.    Vitals:  T(F): 97.8, Max: 101.1 ( @ 16:16)  HR: 96 (93 - 118)  BP: 97/62 (90/54 - 132/70)  RR: 17 (17 - 189)  SpO2: 98% (95% - 98%)    Ins & outs:      @ 07:01  -   @ 06:53  --------------------------------------------------------  IN: 60 mL / OUT: 400 mL / NET: -340 mL      Weight trend:  Weight (kg): 68.3 ()    Physical exam:  General: No apparent distress  HEENT: Anicteric sclera. Moist mucous membranes.   Cardiac: Regular rate and rhythm. No murmurs, rubs, or gallops.   Vascular: Symmetric radial pulses. Dorsalis pedis pulses palpable.   Respiratory: Normal effort. Bibasilar crackles.   Abdomen: Soft, nontender. Audible bowel sounds.   Extremities: Warm with no edema. No cyanosis or clubbing.   Skin: Warm and dry. No rash.   Neurologic: Grossly normal motor function.   Psychiatric: Oriented to person, place, and time.     Data reviewed:  - Telemetry:     - ECG (date***):   -   TAVO (date22     ACC: [id number] EXAM:  ECHO TAVO W DOPP COLOR FLOW#                          PROCEDURE DATE:  2022          INTERPRETATION:   [place] [address]                Phone: [phone].   TRANSESOPHAGEAL ECHOCARDIOGRAM REPORT        Patient Name:   ELBA TO Accession #: [id number]  Medical Rec #:  [id number]  Height:      63.0 in 160.0 cm  YOB: 1990  Weight:      150.0 lb 68.04 kg  Patient Age:    32 years   BSA:         1.71 m²  Patient Gender: F          BP:          110/71 mmHg      Date of Exam:        2022 4:20:44 PM  Referring Physician: Roger  Sonographer:         Leslie Lindsey  Fellow:              Giorgi Posadas    Reading Physician:   Joe Byrne.    Procedure:   Transesophageal Echocardiogram.  Indications: I42.9 - Cardiomyopathy, unspecified  Diagnosis:   Cardiomyopathy, unspecified - I42.9        Summary:   1. Left ventricular ejection fraction, by visual estimation, is 60 to   65%.   2. Severe primary mitral valve regurgitation due to flail of the A2   leaflet, likely due to ruptured chordae. There appears to be flail of   P2/P3 as well.   3. There is an outlet VSD visualized with left to right shunting.   4. Mild to moderately enlarged left atrium.   5. No left atrial appendage thrombus.      PROCEDURE: After discussion of the risks and benefits of the TAVO, an   informed consent was obtained. Intravenous sedation was performed by   anesthesia. The TAVO probe was passed by the fellow without difficulty.   Images were obtained with the patient in a left lateral decubitus   position. The patient's vital signs; including heart rate, blood   pressure, and oxygen saturation; remained stable throughout the  procedure. The patient tolerated the procedure well and without   complications.    PHYSICIAN INTERPRETATION:  Left Ventricle: Normal left ventricular size and wall thicknesses, with   normal systolic and diastolic function. Left ventricular ejection   fraction, by visual estimation, is 60 to 65%.  Right Ventricle: Normal right ventricular size and function.  Left Atrium: Mild to moderately enlarged left atrium. No left atrial   appendage thrombus is seen.  Mitral Valve: No evidence of mitral valve stenosis. Severe mitral valve   regurgitation is seen.  Tricuspid Valve: Trivial tricuspid regurgitation is visualized.  Aortic Valve: Normal trileaflet aortic valve with normal opening. The   aortic valve is trileaflet. No aortic stenosis. No evidence of aortic   valve regurgitation is seen.  Pulmonic Valve: The pulmonic valve was not well visualized. No indication   of pulmonic valve regurgitation.  Aorta: The aortic root is normal in size and structure.  Shunts: There is no evidence of a patent foramen ovale. There is no   evidence of any atrial septal defect.  SPECTRAL DOPPLER ANALYSIS:    [id number] Joe Byrne, Electronically signed on 2022 at 6:28:26 PM            *** Final ***  ***):   - Chest x-ray (date***):       - Stress test:   - CCTA:  - Cardiac catheterization:  - Cardiac MRI:    - Labs:                        10.3   16.81 )-----------( 324      ( 2022 08:11 )             30.8         136  |  101  |  7<L>  ----------------------------<  107<H>  4.7   |  20  |  0.7    Ca    9.6      2022 08:11  Phos  4.4       Mg     2.1         TPro  7.3  /  Alb  3.8  /  TBili  1.1  /  DBili  x   /  AST  38  /  ALT  34  /  AlkPhos  147<H>        Troponin T, Serum: <0.01 ng/mL (11-10-22 @ 20:15)  Troponin T, Serum: <0.01 ng/mL (11-10-22 @ 16:54)  Troponin T, Serum: <0.01 ng/mL (11-10-22 @ 12:26)  Troponin T, Serum: <0.01 ng/mL (11-10-22 @ 04:03)    Serum Pro-Brain Natriuretic Peptide: 2247 pg/mL (11-10)          Urinalysis Basic - ( 10 Nov 2022 10:44 )    Color: Yellow / Appearance: Clear / S.028 / pH: x  Gluc: x / Ketone: Negative  / Bili: Negative / Urobili: <2 mg/dL   Blood: x / Protein: 30 mg/dL / Nitrite: Negative   Leuk Esterase: Negative / RBC: 1 /HPF / WBC 1 /HPF   Sq Epi: x / Non Sq Epi: 2 /HPF / Bacteria: Negative        Medications:  benzocaine 20% Spray 1 Spray(s) Topical three times a day  furosemide   Injectable 40 milliGRAM(s) IV Push daily  influenza   Vaccine 0.5 milliLiter(s) IntraMuscular once  pantoprazole    Tablet 40 milliGRAM(s) Oral before breakfast    Drips:    PRN:     Allergies    Allergy Status Unknown    Intolerances

## 2022-11-12 NOTE — PROGRESS NOTE ADULT - SUBJECTIVE AND OBJECTIVE BOX
ELBA TO  32y, Female  Allergy: Allergy Status Unknown      LOS  2d    CHIEF COMPLAINT: hematemesis + hemoptysis + sob + chronic cough (12 Nov 2022 06:52)      INTERVAL EVENTS/HPI  - BCX with GP pairs anaerobic bottle not ID on PCR  - T(F): , Max: 101.1 (11-11-22 @ 16:16)  - Denies any worsening symptoms  - Tolerating medication  - WBC Count: 16.76 (11-12-22 @ 07:10)  WBC Count: 17.06 (11-12-22 @ 07:10)     - Creatinine, Serum: 0.6 (11-12-22 @ 07:10)  Creatinine, Serum: 0.6 (11-12-22 @ 07:10)       ROS  General: Denies rigors, nightsweats  HEENT: Denies headache, rhinorrhea, sore throat, eye pain  CV: Denies CP, palpitations  PULM: Denies wheezing, hemoptysis  GI: Denies hematemesis, hematochezia, melena  : Denies discharge, hematuria  MSK: Denies arthralgias, myalgias  SKIN: Denies rash, lesions  NEURO: Denies paresthesias, weakness  PSYCH: Denies depression, anxiety    VITALS:  T(F): 98.3, Max: 101.1 (11-11-22 @ 16:16)  HR: 103  BP: 99/55  RR: 17Vital Signs Last 24 Hrs  T(C): 36.8 (12 Nov 2022 07:29), Max: 38.4 (11 Nov 2022 16:16)  T(F): 98.3 (12 Nov 2022 07:29), Max: 101.1 (11 Nov 2022 16:16)  HR: 103 (12 Nov 2022 07:29) (93 - 118)  BP: 99/55 (12 Nov 2022 07:29) (90/54 - 132/70)  BP(mean): 70 (12 Nov 2022 07:29) (67 - 90)  RR: 17 (12 Nov 2022 04:10) (17 - 189)  SpO2: 96% (12 Nov 2022 07:29) (95% - 98%)    Parameters below as of 11 Nov 2022 20:31  Patient On (Oxygen Delivery Method): room air        PHYSICAL EXAM:  Gen: NAD, resting in bed  HEENT: Normocephalic, atraumatic  Neck: supple, no lymphadenopathy  CV: Regular rate & regular rhythm  Lungs: decreased BS at bases, no fremitus  Abdomen: Soft, BS present  Ext: Warm, well perfused  Neuro: non focal, awake  Skin: no rash, no erythema  Lines: no phlebitis    FH: Non-contributory  Social Hx: Non-contributory    TESTS & MEASUREMENTS:                        10.4   16.76 )-----------( 349      ( 12 Nov 2022 07:10 )             31.4     11-12    132<L>  |  96<L>  |  10  ----------------------------<  115<H>  3.6   |  23  |  0.6<L>    Ca    9.2      12 Nov 2022 07:10  Phos  4.4     11-12  Mg     1.9     11-12    TPro  7.5  /  Alb  3.8  /  TBili  1.5<H>  /  DBili  x   /  AST  21  /  ALT  25  /  AlkPhos  146<H>  11-12      LIVER FUNCTIONS - ( 12 Nov 2022 07:10 )  Alb: 3.8 g/dL / Pro: 7.5 g/dL / ALK PHOS: 146 U/L / ALT: 25 U/L / AST: 21 U/L / GGT: x               Culture - Blood (collected 11-10-22 @ 16:54)  Source: .Blood None  Preliminary Report (11-12-22 @ 01:01):    No growth to date.    Culture - Blood (collected 11-10-22 @ 12:26)  Source: .Blood None  Gram Stain (11-12-22 @ 12:30):    Growth in anaerobic bottle: Gram positive cocci in pairs  Preliminary Report (11-12-22 @ 08:18):    Growth in anaerobic bottle: Gram positive cocci in pairs    Culture - Blood (collected 11-10-22 @ 12:26)  Source: .Blood None  Gram Stain (11-12-22 @ 04:43):    Growth in anaerobic bottle: Gram positive cocci in pairs  Preliminary Report (11-12-22 @ 04:43):    Growth in anaerobic bottle: Gram positive cocci in pairs    ***Blood Panel PCR results on this specimen are available    approximately 3 hours after the Gram stain result.***    Gram stain, PCR, and/or culture results may not always    correspond due to difference in methodologies.    ************************************************************    This PCR assay was performed by multiplex PCR. This    Assay tests for 66 bacterial and resistance gene targets.    Please refer to the [facility] test directory    at [email or web address] for details.  Organism: Blood Culture PCR (11-12-22 @ 08:04)  Organism: Blood Culture PCR (11-12-22 @ 08:04)      -  Blood PCR Panel: NEG      Method Type: PCR    Culture - Urine (collected 11-10-22 @ 10:44)  Source: Clean Catch Clean Catch (Midstream)  Final Report (11-12-22 @ 00:27):    <10,000 CFU/mL Normal Urogenital Silke            INFECTIOUS DISEASES TESTING  Rapid RVP Result: Detected (11-11-22 @ 10:40)  Procalcitonin, Serum: 0.20 (11-10-22 @ 12:26)  COVID-19 PCR: NotDetec (11-10-22 @ 05:15)      INFLAMMATORY MARKERS  Sedimentation Rate, Erythrocyte: 68 mm/Hr (11-11-22 @ 08:11)  C-Reactive Protein, Serum: 94.5 mg/L (11-11-22 @ 08:11)      RADIOLOGY & ADDITIONAL TESTS:  I have personally reviewed the last available Chest xray  CXR      CT  CT Angio Chest PE Protocol w/ IV Cont:   ACC: [id number] EXAM:  CT ANGIO CHEST PULM [facility]                          PROCEDURE DATE:  11/10/2022          INTERPRETATION:  Clinical History / Reason for exam: Cough, chest pain,   blood in sputum.    TECHNIQUE: Multislice helical sections were obtained from the thoracic   inlet to the lung bases during rapid administration of 65 mL Omnipaque   350 intravenous contrast using a CTA protocol, 35 cc was discarded. Thin   sections were reconstructed through the pulmonary vasculature. Coronal,  sagittal and 3D/MIP reformatted images are also submitted.    COMPARISON CT: None.      FINDINGS:    PULMONARY EMBOLUS: No central or segmental pulmonary embolus.    LUNGS, PLEURA, AIRWAYS: Small right pleural effusion with predominantly   bibasilar interlobular septal thickening and diffuse hazy groundglass   appearance of the lungs, slightly more focal in the left lower lobe. No   pneumothorax. Central airways are patent.    THORACIC NODES: No mediastinal or axillary enlarged lymph nodes.    MEDIASTINUM/GREAT VESSELS: No pericardial effusion. Heart appears   prominent, question mildly enlarged. Normal caliber thoracic aorta.    VISUALIZED UPPER ABDOMEN: Unremarkable.    BONES/SOFT TISSUES: No acute osseous abnormality.      IMPRESSION:    No CTA evidence of acute pulmonary embolus.    Small right pleural effusion with predominantly bibasilar interlobular   septal thickening and diffuse hazy groundglass appearance of the lungs,   slightly more focal in the left lower lobe. Correlate for pulmonary edema   and/or CHF.    --- End of Report ---          ZEINAB TO MD; Resident Radiologist  This document has been electronically signed.  KAILA SANTIAGO MD; Attending Radiologist  This document has been electronically signed. Nov 10 2022  5:21AM (11-10-22 @ 04:23)      CARDIOLOGY TESTING  12 Lead ECG:   Ventricular Rate 114 BPM    Atrial Rate 114 BPM    P-R Interval 148 ms    QRS Duration 68 ms    Q-T Interval 334 ms    QTC Calculation(Bazett) 460 ms    P Axis 74 degrees    R Axis 62 degrees    T Axis 63 degrees    Diagnosis Line Sinus tachycardia  Possible Left atrial enlargement  Borderline ECG    Confirmed by elina oliver ([id number]) on 11/10/2022 8:34:45 AM (11-10-22 @ 05:30)      MEDICATIONS  benzocaine 20% Spray 1 Topical three times a day  furosemide   Injectable 40 IV Push daily  influenza   Vaccine 0.5 IntraMuscular once  pantoprazole    Tablet 40 Oral before breakfast  vancomycin  IVPB 1000 IV Intermittent every 12 hours      WEIGHT  Weight (kg): 68.3 (11-11-22 @ 17:39)  Creatinine, Serum: 0.6 mg/dL (11-12-22 @ 07:10)  Creatinine, Serum: 0.6 mg/dL (11-12-22 @ 07:10)      ANTIBIOTICS:  vancomycin  IVPB 1000 milliGRAM(s) IV Intermittent every 12 hours      All available historical records have been reviewed

## 2022-11-13 LAB
ALBUMIN SERPL ELPH-MCNC: 3.6 G/DL — SIGNIFICANT CHANGE UP (ref 3.5–5.2)
ALP SERPL-CCNC: 162 U/L — HIGH (ref 30–115)
ALT FLD-CCNC: 64 U/L — HIGH (ref 0–41)
ANION GAP SERPL CALC-SCNC: 13 MMOL/L — SIGNIFICANT CHANGE UP (ref 7–14)
AST SERPL-CCNC: 111 U/L — HIGH (ref 0–41)
BILIRUB SERPL-MCNC: 0.6 MG/DL — SIGNIFICANT CHANGE UP (ref 0.2–1.2)
BUN SERPL-MCNC: 10 MG/DL — SIGNIFICANT CHANGE UP (ref 10–20)
CALCIUM SERPL-MCNC: 9.1 MG/DL — SIGNIFICANT CHANGE UP (ref 8.4–10.4)
CHLORIDE SERPL-SCNC: 100 MMOL/L — SIGNIFICANT CHANGE UP (ref 98–110)
CO2 SERPL-SCNC: 24 MMOL/L — SIGNIFICANT CHANGE UP (ref 17–32)
CREAT SERPL-MCNC: 0.6 MG/DL — LOW (ref 0.7–1.5)
CULTURE RESULTS: SIGNIFICANT CHANGE UP
EGFR: 121 ML/MIN/1.73M2 — SIGNIFICANT CHANGE UP
GLUCOSE SERPL-MCNC: 121 MG/DL — HIGH (ref 70–99)
GRAM STN FLD: SIGNIFICANT CHANGE UP
HCT VFR BLD CALC: 29.3 % — LOW (ref 37–47)
HGB BLD-MCNC: 9.7 G/DL — LOW (ref 12–16)
MAGNESIUM SERPL-MCNC: 2 MG/DL — SIGNIFICANT CHANGE UP (ref 1.8–2.4)
MCHC RBC-ENTMCNC: 26.1 PG — LOW (ref 27–31)
MCHC RBC-ENTMCNC: 33.1 G/DL — SIGNIFICANT CHANGE UP (ref 32–37)
MCV RBC AUTO: 79 FL — LOW (ref 81–99)
NRBC # BLD: 0 /100 WBCS — SIGNIFICANT CHANGE UP (ref 0–0)
ORGANISM # SPEC MICROSCOPIC CNT: SIGNIFICANT CHANGE UP
PLATELET # BLD AUTO: 362 K/UL — SIGNIFICANT CHANGE UP (ref 130–400)
POTASSIUM SERPL-MCNC: 4.4 MMOL/L — SIGNIFICANT CHANGE UP (ref 3.5–5)
POTASSIUM SERPL-SCNC: 4.4 MMOL/L — SIGNIFICANT CHANGE UP (ref 3.5–5)
PROT SERPL-MCNC: 7.1 G/DL — SIGNIFICANT CHANGE UP (ref 6–8)
RBC # BLD: 3.71 M/UL — LOW (ref 4.2–5.4)
RBC # FLD: 14.4 % — SIGNIFICANT CHANGE UP (ref 11.5–14.5)
SODIUM SERPL-SCNC: 137 MMOL/L — SIGNIFICANT CHANGE UP (ref 135–146)
SPECIMEN SOURCE: SIGNIFICANT CHANGE UP
SPECIMEN SOURCE: SIGNIFICANT CHANGE UP
VANCOMYCIN TROUGH SERPL-MCNC: 11.4 UG/ML — HIGH (ref 5–10)
WBC # BLD: 14.28 K/UL — HIGH (ref 4.8–10.8)
WBC # FLD AUTO: 14.28 K/UL — HIGH (ref 4.8–10.8)

## 2022-11-13 PROCEDURE — 71045 X-RAY EXAM CHEST 1 VIEW: CPT | Mod: 26

## 2022-11-13 PROCEDURE — 99231 SBSQ HOSP IP/OBS SF/LOW 25: CPT

## 2022-11-13 RX ORDER — PENICILLIN G POTASSIUM 5000000 [IU]/1
POWDER, FOR SOLUTION INTRAMUSCULAR; INTRAPLEURAL; INTRATHECAL; INTRAVENOUS
Refills: 0 | Status: DISCONTINUED | OUTPATIENT
Start: 2022-11-13 | End: 2022-11-14

## 2022-11-13 RX ORDER — PENICILLIN G POTASSIUM 5000000 [IU]/1
4 POWDER, FOR SOLUTION INTRAMUSCULAR; INTRAPLEURAL; INTRATHECAL; INTRAVENOUS EVERY 6 HOURS
Refills: 0 | Status: DISCONTINUED | OUTPATIENT
Start: 2022-11-13 | End: 2022-11-14

## 2022-11-13 RX ORDER — PENICILLIN G POTASSIUM 5000000 [IU]/1
4 POWDER, FOR SOLUTION INTRAMUSCULAR; INTRAPLEURAL; INTRATHECAL; INTRAVENOUS ONCE
Refills: 0 | Status: COMPLETED | OUTPATIENT
Start: 2022-11-13 | End: 2022-11-13

## 2022-11-13 RX ORDER — FUROSEMIDE 40 MG
40 TABLET ORAL ONCE
Refills: 0 | Status: COMPLETED | OUTPATIENT
Start: 2022-11-13 | End: 2022-11-13

## 2022-11-13 RX ORDER — ONDANSETRON 8 MG/1
4 TABLET, FILM COATED ORAL ONCE
Refills: 0 | Status: COMPLETED | OUTPATIENT
Start: 2022-11-13 | End: 2022-11-13

## 2022-11-13 RX ORDER — FUROSEMIDE 40 MG
40 TABLET ORAL
Refills: 0 | Status: DISCONTINUED | OUTPATIENT
Start: 2022-11-14 | End: 2022-11-14

## 2022-11-13 RX ADMIN — Medication 1 SPRAY(S): at 05:35

## 2022-11-13 RX ADMIN — Medication 250 MILLIGRAM(S): at 17:37

## 2022-11-13 RX ADMIN — ONDANSETRON 4 MILLIGRAM(S): 8 TABLET, FILM COATED ORAL at 00:28

## 2022-11-13 RX ADMIN — PENICILLIN G POTASSIUM 100 MILLION UNIT(S): 5000000 POWDER, FOR SOLUTION INTRAMUSCULAR; INTRAPLEURAL; INTRATHECAL; INTRAVENOUS at 20:28

## 2022-11-13 RX ADMIN — ONDANSETRON 4 MILLIGRAM(S): 8 TABLET, FILM COATED ORAL at 22:27

## 2022-11-13 RX ADMIN — Medication 10 MILLILITER(S): at 00:28

## 2022-11-13 RX ADMIN — Medication 40 MILLIGRAM(S): at 05:35

## 2022-11-13 RX ADMIN — Medication 40 MILLIGRAM(S): at 17:37

## 2022-11-13 RX ADMIN — PENICILLIN G POTASSIUM 100 MILLION UNIT(S): 5000000 POWDER, FOR SOLUTION INTRAMUSCULAR; INTRAPLEURAL; INTRATHECAL; INTRAVENOUS at 12:13

## 2022-11-13 RX ADMIN — PANTOPRAZOLE SODIUM 40 MILLIGRAM(S): 20 TABLET, DELAYED RELEASE ORAL at 05:36

## 2022-11-13 RX ADMIN — Medication 1 SPRAY(S): at 14:54

## 2022-11-13 RX ADMIN — Medication 250 MILLIGRAM(S): at 05:35

## 2022-11-13 NOTE — PROGRESS NOTE ADULT - ASSESSMENT
Assessment:  33 yo F w PMH of congenital VSD (uncorrected) presented to the ED w chronic cough, SOB, hematemesis & hemoptysis 11/10, found to have severe mitral regurgitation and VSD with left to right shunting on TAVO. Now admitted to 4T for optimization prior to mitral valve and VSD repair.     Problems discussed and associated plan:    #Gram +cocci on BC  - repeat blood culture until clearance   - Start vanco 1gm q12h once BC drawn\  - TTE reread by Dr. Jordan: vegetation found  - ID reccs appreciated: GPC pairs anaerobic started on PCN 4 million units q6 IV, Vanc 1g BID IV (please check trough 30 min before 4th dose (range 15-20 if >20 HOLD vanc and order AM random vanco level)   - ***4th dose is 11/14 @5 am, please order for 4:30 am****    #Severe MR with flail anterior leaflet   - VSD/MVR once medically optimized and bcx clear  - CTS reccs appreciated     #New onset CHF, BNP 2247   -Lasix 40mg IVP, changed to Lasix 40 mg IVP BID (11/14)   -daily standing weights  - strict I&O    #Hemoptysis - resolved    #Chronic cough/persistent nausea  - guaifenesin DM prn  - zofran 4mg IVP prn  - Entero//Rhinovirus + (Isolation precautions not needed at this time)     FULL CODE  DVT PPX: compression leg devices  Please contact me with any questions or concerns at [phone].

## 2022-11-13 NOTE — PROGRESS NOTE ADULT - SUBJECTIVE AND OBJECTIVE BOX
Chief complaint: Patient is a 32y old  Female who presents with a chief complaint of hematemesis + hemoptysis + sob + chronic cough (13 Nov 2022 09:50)    Interval history:  Blood cultures 11/11 resulted: Gram positive cocci in pairs and gram positive cocci in clusters  TTE reread by Dr. Jordan with a confirmed vegetation   ID: started on Vancomycin, initial trough pending   SBP remained <100 overnight  Patient with persistent cough this AM, afebrile     Review of systems: A complete 10-point review of systems was obtained and is negative except as stated in the interval history.    Vitals:  T(F): 97.4, Max: 101.9 (11-12 @ 17:50)  HR: 103 (95 - 110)  BP: 94/55 (94/55 - 102/68)  RR: 18 (18 - 18)  SpO2: 97% (95% - 97%)    Ins & outs:     11-11 @ 07:01  -  11-12 @ 07:00  --------------------------------------------------------  IN: 60 mL / OUT: 400 mL / NET: -340 mL    11-12 @ 07:01 - 11-13 @ 07:00  --------------------------------------------------------  IN: 970 mL / OUT: 800 mL / NET: 170 mL    11-13 @ 07:01 - 11-13 @ 11:37  --------------------------------------------------------  IN: 100 mL / OUT: 0 mL / NET: 100 mL      Weight trend:  Weight (kg): 68.3 (11-11)    Physical exam:  General: No apparent distress  HEENT: Anicteric sclera. Moist mucous membranes. JVD -  Cardiac: Regular rate and rhythm. Systolic murmur appreciated on the 5th ICS MCL, no rubs, or gallops.   Vascular: Symmetric radial pulses. Dorsalis pedis pulses palpable.   Respiratory: Normal effort. Left sided  basilar crackles.   Abdomen: Soft, nontender. Audible bowel sounds.   Extremities: Warm with - edema. No cyanosis or clubbing.   Skin: Warm and dry. No rash.   Neurologic: Grossly normal motor function.   Psychiatric: Oriented to person, place, and time.     Data reviewed:  - Telemetry: SR 90-98, no events overnight   - ECG (date 11/10/22 ):  Ventricular Rate 114 BPM    Atrial Rate 114 BPM    P-R Interval 148 ms    QRS Duration 68 ms    Q-T Interval 334 ms    QTC Calculation(Bazett) 460 ms    P Axis 74 degrees    R Axis 62 degrees    T Axis 63 degrees    Diagnosis Line Sinus tachycardia  Possible Left atrial enlargement  Borderline ECG    Confirmed by elina oliver ([id number]) on 11/10/2022 8:34:45 AM     - TTE (date 11/11/22):  Summary:   1. Left ventricular ejection fraction, by visual estimation, is 60 to   65%.   2. Severe primary mitral valve regurgitation due to flail of the A2   leaflet, likely due to ruptured chordae. There appears to be flail of   P2/P3 as well.   3. There is an outlet VSD visualized with left to right shunting.   4. Mild to moderately enlarged left atrium.   5. No left atrial appendage thrombus.     - Chest x-ray (date 11/13/22):  There are bilateral opacities. No evidence of pneumothorax.     - CT Angio Chest PE Protocol w/ IV cont: (11/10/22)  IMPRESSION:    No CTA evidence of acute pulmonary embolus.    Small right pleural effusion with predominantly bibasilar interlobular   septal thickening and diffuse hazy groundglass appearance of the lungs,   slightly more focal in the left lower lobe. Correlate for pulmonary edema   and/or CHF.    - NO prior Stress test, CCTA, Cardiac catheterization, Cardiac MRI:    - Labs:                        10.4   16.76 )-----------( 349      ( 12 Nov 2022 07:10 )             31.4     11-12    132<L>  |  96<L>  |  10  ----------------------------<  115<H>  3.6   |  23  |  0.6<L>    Ca    9.2      12 Nov 2022 07:10  Phos  4.4     11-12  Mg     1.9     11-12    TPro  7.5  /  Alb  3.8  /  TBili  1.5<H>  /  DBili  x   /  AST  21  /  ALT  25  /  AlkPhos  146<H>  11-12      Troponin T, Serum: <0.01 ng/mL (11-10-22 @ 20:15)  Troponin T, Serum: <0.01 ng/mL (11-10-22 @ 16:54)  Troponin T, Serum: <0.01 ng/mL (11-10-22 @ 12:26)  Troponin T, Serum: <0.01 ng/mL (11-10-22 @ 04:03)    Serum Pro-Brain Natriuretic Peptide: 2247 pg/mL (11-10)    Medications:  benzocaine 20% Spray 1 Spray(s) Topical three times a day  furosemide   Injectable 40 milliGRAM(s) IV Push once  influenza   Vaccine 0.5 milliLiter(s) IntraMuscular once  pantoprazole    Tablet 40 milliGRAM(s) Oral before breakfast  penicillin   G  potassium  IVPB 4 Million Unit(s) IV Intermittent once  penicillin   G  potassium  IVPB 4 Million Unit(s) IV Intermittent every 6 hours  penicillin   G  potassium  IVPB      vancomycin  IVPB 1000 milliGRAM(s) IV Intermittent every 12 hours    Drips:    PRN:     Allergies    Allergy Status Unknown    Intolerances      Assessment:      Problems discussed and associated plan:      Please contact me with any questions or concerns at [phone].

## 2022-11-13 NOTE — PROGRESS NOTE ADULT - SUBJECTIVE AND OBJECTIVE BOX
OPERATIVE PROCEDURE(s):   pre-op for vsd repair and mvr             POD #    SURGEON(s):      SUBJECTIVE ASSESSMENT:    Vital Signs Last 24 Hrs  T(C): 36.3 (13 Nov 2022 07:58), Max: 38.8 (12 Nov 2022 17:50)  T(F): 97.4 (13 Nov 2022 07:58), Max: 101.9 (12 Nov 2022 17:50)  HR: 100 (13 Nov 2022 12:20) (95 - 110)  BP: 99/59 (13 Nov 2022 12:20) (94/55 - 102/68)  BP(mean): 74 (13 Nov 2022 12:20) (69 - 78)  RR: 18 (13 Nov 2022 05:02) (18 - 18)  SpO2: 98% (13 Nov 2022 12:20) (95% - 98%)    Parameters below as of 13 Nov 2022 07:58  Patient On (Oxygen Delivery Method): room air      11-12-22 @ 07:01  -  11-13-22 @ 07:00  --------------------------------------------------------  IN: 970 mL / OUT: 800 mL / NET: 170 mL    11-13-22 @ 07:01  -  11-13-22 @ 14:37  --------------------------------------------------------  IN: 200 mL / OUT: 0 mL / NET: 200 mL      CONSTITUTIONAL:    well nourished, well developed,  in NAD                                                                       Neuro: oriented to person/place & time with no focal motor or sensory  deficits     Eyes: PERRLA, EOMI, no nystagmus, sclera anicteric  ENT:  nasal/oral mucosa with absence of cyanosis, fair dentition  Neck: no jugular vein distention, trachea midline, no goiter   Chest: bilateral breath sounds with good air movement. scattered  rales,, decreased right base                                                                          CV:  RTR, S1S2, ++++ significant murmur appreciated anterior and posterior chest  Carotids: ? Bruits may be referred murmur  GI:  soft, non-tender non-distended, + bowel sounds                                                                                                          Extremities:  no evidence of cyanosis or deformity, no pedal edema   Extremity Pulses: right / left:  DP's 2+/2+; radials 2+/2+    SKIN : no rashes    LABS:                        10.4   16.76 )-----------( 349      ( 12 Nov 2022 07:10 )             31.4     COUMADIN:   [ ] YES [ x NO      11-12    132<L>  |  96<L>  |  10  ----------------------------<  115<H>  3.6   |  23  |  0.6<L>    Ca    9.2      12 Nov 2022 07:10  Phos  4.4     11-12  Mg     1.9     11-12    TPro  7.5  /  Alb  3.8  /  TBili  1.5<H>  /  DBili  x   /  AST  21  /  ALT  25  /  AlkPhos  146<H>  11-12    Culture - Blood in AM (11.11.22 @ 08:11)    Gram Stain:   Growth in anaerobic bottle: Gram positive cocci in pairs    Specimen Source: .Blood None    Culture Results:   Growth in anaerobic bottle: Gram positive cocci in pairs    MEDICATIONS  (STANDING):  benzocaine 20% Spray 1 Spray(s) Topical three times a day  furosemide   Injectable 40 milliGRAM(s) IV Push once  influenza   Vaccine 0.5 milliLiter(s) IntraMuscular once  pantoprazole    Tablet 40 milliGRAM(s) Oral before breakfast  penicillin   G  potassium  IVPB 4 Million Unit(s) IV Intermittent every 6 hours  penicillin   G  potassium  IVPB      vancomycin  IVPB 1000 milliGRAM(s) IV Intermittent every 12 hours    MEDICATIONS  (PRN):  acetaminophen     Tablet .. 650 milliGRAM(s) Oral every 6 hours PRN Temp greater or equal to 38C (100.4F)  guaifenesin/dextromethorphan Oral Liquid 10 milliLiter(s) Oral every 4 hours PRN Cough  sodium chloride 0.65% Nasal 1 Spray(s) Both Nostrils three times a day PRN Nasal Congestion      Allergies    Allergy Status Unknown    Intolerances        Ambulation/Activity Status:  amb well      RADIOLOGY & ADDITIONAL TESTS:  ACC: [id number] EXAM:  ECHO TTE WO CON COMP W DOPP                          PROCEDURE DATE:  11/10/2022          INTERPRETATION:   [place] [address]                Phone: [phone].   TRANSTHORACIC ECHOCARDIOGRAM REPORT        Patient Name:   ELBA TO Accession #: [id number]  Medical Rec #:  [id number]  Height:      63.0 in 160.0 cm  YOB: 1990  Weight:      149.0 lb 67.59 kg  Patient Age:    32 years   BSA:         1.71 m²  Patient Gender: F          BP:          108/76 mmHg      Date of Exam:        11/10/2022 5:00:10 PM  Referring Physician: Mane Ruby  Sonographer:         Nikia Crockett  Reading Physician:   Joe Byrne.    Procedure:   2D Echo/Doppler/Color Doppler Complete.  Indications: I50.9 - Heart Failure, unspecified  Diagnosis:   Heart failure, unspecified - I50.9        Summary:   1. Normal global left ventricular systolic function.   2. LV Ejection Fraction by Pop's Method with a biplane EF of 59 %.   3. Mildly enlarged right ventricle.   4. Mildly reduced RV systolic function.   5. Severe mitral valve regurgitation, likely acute due to flail anterior   leaflet.   6. Severe tricuspid regurgitation.   7. There is a supracristal VSD.    PHYSICIAN INTERPRETATION:  Left Ventricle: The left ventricular internal cavity size is normal. Left   ventricular wall thickness is normal. Global LV systolic function was   normal. The left ventricular diastolic function could not be assessed in   this study.  The ratio of pulmonic flow to systemic flow (Qp/Qs ratio) is 2.64.  Right Ventricle: The right ventricular size is mildly enlarged. RV   systolic function is mildly reduced.  Left Atrium: Moderately enlarged left atrium.  Pericardium: Trivial pericardial effusion is present.  Mitral Valve: Severe mitral valve regurgitation is seen.  Tricuspid Valve: Structurally normal tricuspid valve, with normal leaflet   excursion. Severe tricuspid regurgitation is visualized.  Pulmonic Valve: Structurally normal pulmonic valve, with normal leaflet   excursion. Mild pulmonic valve regurgitation.  Shunts: The ratio of pulmonic flow to systemic flow (Qp/Qs ratio) is 2.64.      2D AND M-MODE MEASUREMENTS (normal ranges within parentheses):  Left Ventricle:                  Normal  IVSd (2D):              0.62 cm (0.7-1.1)  LVPWd (2D):             0.98 cm (0.7-1.1)  LVIDd (2D):             4.78 cm (3.4-5.7)  LVIDs (2D):             3.09 cm  LV FS (2D):          35.4 %   (>25%)  Relative Wall Thickness  0.41    (<0.42)    SPECTRAL DOPPLER ANALYSIS:  LV DIASTOLIC FUNCTION:  MV Peak E: 1.79 m/s Decel Time: 138 msec  MV Peak A: 1.03 m/s  E/A Ratio: 1.73    Aortic Valve:  AoV VMax:    1.49 m/s AoV Area, Vmax:    1.39 cm² Vmax Indx:    0.81   cm²/m²  AoV VTI:     0.21 m   AoV Area, VTI:     1.35 cm² VTI Indx:     0.79   cm²/m²  AoV Pk Grad: 8.9 mmHg AoV Area, Mn Grad: 1.49 cm² Mn Grad Indx: 0.87   cm²/m²  AoV Mn Grad: 4.7 mmHg    LVOT Vmax: 1.02 m/s  LVOT VTI:  0.14 m  LVOT Diam: 1.61 cm    Mitral Valve:  MV P1/2 Time: 39.91 msec  MV Area, PHT: 5.51 cm²    Tricuspid Valve and PA/RV Systolic Pressure: TR Max Velocity: 3.99 m/s RA   Pressure:  RVSP/PASP:    Shunt:  Qp/Qs: 2.64      [id number] [facility], Electronically signed on 11/10/2022 at 5:46:38 PM    TAVO Preliminary Findings:  LA: Mild to moderately enlarged  TELMA: Left atrial appendage was clear of clot and smoke.  LV: LVEF was estimated at 60%  MV: Severe mitral valve regurgitation. Flial anterior leaflet likely due to chord rupture.  AV: No evidence of AI, no evidence of AS.   RA: normal   TV: trace TR  PV: no PI.   IAS: intact   IVS: VSD with L --> R shunt  There was no atheroma seen in the thoracic aorta.     DIAGNOSIS/IMPRESSION: Severe MR from flial anterior leaflet likely due to chord rupture.  Assessment/Plan: Pt is a 33 yo F w PMH of congenital VSD (uncorrected) presented to the ED w chronic cough, SOB, hematemesis & hemoptysis 11/10, found to have severe mitral regurgitation and VSD with left to right shunting on TAVO. Now admitted to 4T for optimization prior to mitral valve and VSD repair-- vegetation seen on echo  cont present tx as per card/id  will cont to follow and plan for sx once blood cx's result negative

## 2022-11-13 NOTE — PROGRESS NOTE ADULT - ASSESSMENT
33 yo F w PMH of congenital VSD (uncorrected) presented to the ED w chronic cough, SOB, hematemesis & hemoptysis.  Pt co chronic cough for the past 2 months, initially non-productive, now slightly productive associated with SOB. Initially she was able to climb one flight of stairs, but the SOB & cough have worsened over the past 3 weeks, now claims that she would be short of breath if she walked 5ft  She was seen by her PCP & pulmonologist for the cough & sob and was advised to use inhalers and azithromycin with no relief.  Yesterday night her cough worsened and also had an episode of vomiting, found 10-15 ml of blood mixed with the vomitus. Her cough episode later showed sputum w blood streaks. Claims that this was new, and never happened in the past 2 months.  Never smoked in life, FMHx NG for bleeding disorders, denies PND, orthopnea.  CTA PE: Small right pleural effusion + bibasilar interlobular septal thickening + diffuse hazy ground-glass appearance (slightly more focal in the LLL)    	  IMPRESSION  #GP pairs anaerobic bottle not ID on PCR, high concern for IE    11/11 Growth in anaerobic bottle: Gram positive cocci in pairs    11/10 Growth in aerobic & anaerobic bottle: Gram positive cocci in pairs    11/10 Growth in anaerobic bottle: Gram positive cocci in pairs    11/10 Growth in anaerobic bottle: Gram positive cocci in pairs  #VSD  #Severe MR with flail anterior leaflet . Endocarditis in DDX- TAVO Severe MR from flial anterior leaflet likely due to chord rupture.  #Severe TR  #No bacterial PNA  #Doubt pertussis ( has had 2 courses of Azithro with no change in cough )  #Hemoptysis - resolved  Chronic cough which is not infectious in nature    RECOMMENDATIONS;  - Repeat BCX until clearance  - f/u GPC pairs anaerobic ?peptostrep, gemella?  - PCN 4 million units every 6h IV   - Vanc 1g q12h IV   - Please check vanc trough 30 min prior to 4th dose. Goal trough 15-20. If trough results > 20, please HOLD further Vanco dosing and order AM Random Vanco level   - CTS   31 yo F w PMH of congenital VSD (uncorrected) presented to the ED w chronic cough, SOB, hematemesis & hemoptysis.  Pt co chronic cough for the past 2 months, initially non-productive, now slightly productive associated with SOB. Initially she was able to climb one flight of stairs, but the SOB & cough have worsened over the past 3 weeks, now claims that she would be short of breath if she walked 5ft  She was seen by her PCP & pulmonologist for the cough & sob and was advised to use inhalers and azithromycin with no relief.  Yesterday night her cough worsened and also had an episode of vomiting, found 10-15 ml of blood mixed with the vomitus. Her cough episode later showed sputum w blood streaks. Claims that this was new, and never happened in the past 2 months.  Never smoked in life, FMHx NG for bleeding disorders, denies PND, orthopnea.  CTA PE: Small right pleural effusion + bibasilar interlobular septal thickening + diffuse hazy ground-glass appearance (slightly more focal in the LLL)    IMPRESSION  #GP pairs anaerobic bottle not ID on PCR, high concern for IE    11/11 Growth in anaerobic bottle: Gram positive cocci in pairs    11/10 Growth in aerobic & anaerobic bottle: Gram positive cocci in pairs    11/10 Growth in anaerobic bottle: Gram positive cocci in pairs    11/10 Growth in anaerobic bottle: Gram positive cocci in pairs  #VSD  #Severe MR with flail anterior leaflet . Endocarditis in DDX- TAVO Severe MR from flial anterior leaflet likely due to chord rupture.  #Severe TR  #No bacterial PNA  #Doubt pertussis ( has had 2 courses of Azithro with no change in cough )  #Hemoptysis - resolved  Chronic cough which is not infectious in nature    RECOMMENDATIONS;  - Repeat BCX until clearance  - f/u GPC pairs anaerobic ?peptostrep, gemella?  - PCN 4 million units every 6h IV   - Vanc 1g q12h IV   - Please check vanc trough 30 min prior to 4th dose. Goal trough 15-20. If trough results > 20, please HOLD further Vanco dosing and order AM Random Vanco level   - CTS

## 2022-11-13 NOTE — PROGRESS NOTE ADULT - SUBJECTIVE AND OBJECTIVE BOX
ELBA TO  32y, Female  Allergy: Allergy Status Unknown      LOS  3d    CHIEF COMPLAINT: hematemesis + hemoptysis + sob + chronic cough (12 Nov 2022 15:56)      INTERVAL EVENTS/HPI  - fever   - T(F): , Max: 101.9 (11-12-22 @ 17:50)  - Tolerating medication  - WBC Count: 16.76 (11-12-22 @ 07:10)  WBC Count: 17.06 (11-12-22 @ 07:10)     - Creatinine, Serum: 0.6 (11-12-22 @ 07:10)  Creatinine, Serum: 0.6 (11-12-22 @ 07:10)       ROS  ***    VITALS:  T(F): 97.4, Max: 101.9 (11-12-22 @ 17:50)  HR: 103  BP: 94/55  RR: 18Vital Signs Last 24 Hrs  T(C): 36.3 (13 Nov 2022 07:58), Max: 38.8 (12 Nov 2022 17:50)  T(F): 97.4 (13 Nov 2022 07:58), Max: 101.9 (12 Nov 2022 17:50)  HR: 103 (13 Nov 2022 07:58) (95 - 110)  BP: 94/55 (13 Nov 2022 07:58) (94/55 - 102/68)  BP(mean): 69 (13 Nov 2022 07:58) (69 - 78)  RR: 18 (13 Nov 2022 05:02) (18 - 18)  SpO2: 97% (13 Nov 2022 07:58) (95% - 97%)    Parameters below as of 13 Nov 2022 07:58  Patient On (Oxygen Delivery Method): room air        PHYSICAL EXAM:  ***    FH: Non-contributory  Social Hx: Non-contributory    TESTS & MEASUREMENTS:                        10.4   16.76 )-----------( 349      ( 12 Nov 2022 07:10 )             31.4     11-12    132<L>  |  96<L>  |  10  ----------------------------<  115<H>  3.6   |  23  |  0.6<L>    Ca    9.2      12 Nov 2022 07:10  Phos  4.4     11-12  Mg     1.9     11-12    TPro  7.5  /  Alb  3.8  /  TBili  1.5<H>  /  DBili  x   /  AST  21  /  ALT  25  /  AlkPhos  146<H>  11-12      LIVER FUNCTIONS - ( 12 Nov 2022 07:10 )  Alb: 3.8 g/dL / Pro: 7.5 g/dL / ALK PHOS: 146 U/L / ALT: 25 U/L / AST: 21 U/L / GGT: x               Culture - Blood (collected 11-11-22 @ 08:11)  Source: .Blood None  Gram Stain (11-13-22 @ 00:46):    Growth in anaerobic bottle: Gram positive cocci in pairs  Preliminary Report (11-13-22 @ 00:46):    Growth in anaerobic bottle: Gram positive cocci in pairs    Culture - Blood (collected 11-10-22 @ 16:54)  Source: .Blood None  Gram Stain (11-13-22 @ 08:24):    Growth in aerobic bottle: Gram positive cocci in pairs    Growth in anaerobic bottle: Gram positive cocci in pairs  Preliminary Report (11-13-22 @ 08:24):    Growth in aerobic bottle: Gram positive cocci in pairs    Growth in anaerobic bottle: Gram positive cocci in pairs    Culture - Blood (collected 11-10-22 @ 12:26)  Source: .Blood None  Gram Stain (11-12-22 @ 12:30):    Growth in anaerobic bottle: Gram positive cocci in pairs  Preliminary Report (11-12-22 @ 08:18):    Growth in anaerobic bottle: Gram positive cocci in pairs    Culture - Blood (collected 11-10-22 @ 12:26)  Source: .Blood None  Gram Stain (11-12-22 @ 04:43):    Growth in anaerobic bottle: Gram positive cocci in pairs  Preliminary Report (11-12-22 @ 04:43):    Growth in anaerobic bottle: Gram positive cocci in pairs    ***Blood Panel PCR results on this specimen are available    approximately 3 hours after the Gram stain result.***    Gram stain, PCR, and/or culture results may not always    correspond due to difference in methodologies.    ************************************************************    This PCR assay was performed by multiplex PCR. This    Assay tests for 66 bacterial and resistance gene targets.    Please refer to the [facility] test directory    at [email or web address] for details.  Organism: Blood Culture PCR (11-12-22 @ 08:04)  Organism: Blood Culture PCR (11-12-22 @ 08:04)      -  Blood PCR Panel: NEG      Method Type: PCR    Culture - Urine (collected 11-10-22 @ 10:44)  Source: Clean Catch Clean Catch (Midstream)  Final Report (11-12-22 @ 00:27):    <10,000 CFU/mL Normal Urogenital Silke            INFECTIOUS DISEASES TESTING  Rapid RVP Result: Detected (11-11-22 @ 10:40)  Procalcitonin, Serum: 0.20 (11-10-22 @ 12:26)  COVID-19 PCR: NotDetec (11-10-22 @ 05:15)  strept    INFLAMMATORY MARKERS  Sedimentation Rate, Erythrocyte: 68 mm/Hr (11-11-22 @ 08:11)  C-Reactive Protein, Serum: 94.5 mg/L (11-11-22 @ 08:11)      RADIOLOGY & ADDITIONAL TESTS:  I have personally reviewed the last available Chest xray  CXR      CT      CARDIOLOGY TESTING  12 Lead ECG:   Ventricular Rate 114 BPM    Atrial Rate 114 BPM    P-R Interval 148 ms    QRS Duration 68 ms    Q-T Interval 334 ms    QTC Calculation(Bazett) 460 ms    P Axis 74 degrees    R Axis 62 degrees    T Axis 63 degrees    Diagnosis Line Sinus tachycardia  Possible Left atrial enlargement  Borderline ECG    Confirmed by elina oliver ([id number]) on 11/10/2022 8:34:45 AM (11-10-22 @ 05:30)      MEDICATIONS  benzocaine 20% Spray 1 Topical three times a day  furosemide   Injectable 40 IV Push daily  influenza   Vaccine 0.5 IntraMuscular once  pantoprazole    Tablet 40 Oral before breakfast  penicillin   G  potassium  IVPB     vancomycin  IVPB 1000 IV Intermittent every 12 hours      WEIGHT  Weight (kg): 68.3 (11-11-22 @ 17:39)      ANTIBIOTICS:  penicillin   G  potassium  IVPB      vancomycin  IVPB 1000 milliGRAM(s) IV Intermittent every 12 hours      All available historical records have been reviewed       ELBA TO  32y, Female  Allergy: Allergy Status Unknown      LOS  3d    CHIEF COMPLAINT: hematemesis + hemoptysis + sob + chronic cough (12 Nov 2022 15:56)      INTERVAL EVENTS/HPI  - fever   - T(F): , Max: 101.9 (11-12-22 @ 17:50)  - Tolerating medication  - WBC Count: 16.76 (11-12-22 @ 07:10)  WBC Count: 17.06 (11-12-22 @ 07:10)     - Creatinine, Serum: 0.6 (11-12-22 @ 07:10)  Creatinine, Serum: 0.6 (11-12-22 @ 07:10)       ROS  General: Denies rigors, nightsweats  HEENT: Denies headache, rhinorrhea, sore throat, eye pain  CV: Denies CP, palpitations  PULM: Denies wheezing, hemoptysis  GI: Denies hematemesis, hematochezia, melena  : Denies discharge, hematuria  MSK: Denies arthralgias, myalgias  SKIN: Denies rash, lesions  NEURO: Denies paresthesias, weakness  PSYCH: Denies depression, anxiety     VITALS:  T(F): 97.4, Max: 101.9 (11-12-22 @ 17:50)  HR: 103  BP: 94/55  RR: 18Vital Signs Last 24 Hrs  T(C): 36.3 (13 Nov 2022 07:58), Max: 38.8 (12 Nov 2022 17:50)  T(F): 97.4 (13 Nov 2022 07:58), Max: 101.9 (12 Nov 2022 17:50)  HR: 103 (13 Nov 2022 07:58) (95 - 110)  BP: 94/55 (13 Nov 2022 07:58) (94/55 - 102/68)  BP(mean): 69 (13 Nov 2022 07:58) (69 - 78)  RR: 18 (13 Nov 2022 05:02) (18 - 18)  SpO2: 97% (13 Nov 2022 07:58) (95% - 97%)    Parameters below as of 13 Nov 2022 07:58  Patient On (Oxygen Delivery Method): room air        PHYSICAL EXAM:  Gen: NAD, resting in bed  HEENT: Normocephalic, atraumatic  Neck: supple, no lymphadenopathy  CV: Regular rate & regular rhythm  Lungs: decreased BS at bases, no fremitus  Abdomen: Soft, BS present  Ext: Warm, well perfused  Neuro: non focal, awake  Skin: no rash, no erythema  Lines: no phlebitis   FH: Non-contributory  Social Hx: Non-contributory    TESTS & MEASUREMENTS:                        10.4   16.76 )-----------( 349      ( 12 Nov 2022 07:10 )             31.4     11-12    132<L>  |  96<L>  |  10  ----------------------------<  115<H>  3.6   |  23  |  0.6<L>    Ca    9.2      12 Nov 2022 07:10  Phos  4.4     11-12  Mg     1.9     11-12    TPro  7.5  /  Alb  3.8  /  TBili  1.5<H>  /  DBili  x   /  AST  21  /  ALT  25  /  AlkPhos  146<H>  11-12      LIVER FUNCTIONS - ( 12 Nov 2022 07:10 )  Alb: 3.8 g/dL / Pro: 7.5 g/dL / ALK PHOS: 146 U/L / ALT: 25 U/L / AST: 21 U/L / GGT: x               Culture - Blood (collected 11-11-22 @ 08:11)  Source: .Blood None  Gram Stain (11-13-22 @ 00:46):    Growth in anaerobic bottle: Gram positive cocci in pairs  Preliminary Report (11-13-22 @ 00:46):    Growth in anaerobic bottle: Gram positive cocci in pairs    Culture - Blood (collected 11-10-22 @ 16:54)  Source: .Blood None  Gram Stain (11-13-22 @ 08:24):    Growth in aerobic bottle: Gram positive cocci in pairs    Growth in anaerobic bottle: Gram positive cocci in pairs  Preliminary Report (11-13-22 @ 08:24):    Growth in aerobic bottle: Gram positive cocci in pairs    Growth in anaerobic bottle: Gram positive cocci in pairs    Culture - Blood (collected 11-10-22 @ 12:26)  Source: .Blood None  Gram Stain (11-12-22 @ 12:30):    Growth in anaerobic bottle: Gram positive cocci in pairs  Preliminary Report (11-12-22 @ 08:18):    Growth in anaerobic bottle: Gram positive cocci in pairs    Culture - Blood (collected 11-10-22 @ 12:26)  Source: .Blood None  Gram Stain (11-12-22 @ 04:43):    Growth in anaerobic bottle: Gram positive cocci in pairs  Preliminary Report (11-12-22 @ 04:43):    Growth in anaerobic bottle: Gram positive cocci in pairs    ***Blood Panel PCR results on this specimen are available    approximately 3 hours after the Gram stain result.***    Gram stain, PCR, and/or culture results may not always    correspond due to difference in methodologies.    ************************************************************    This PCR assay was performed by multiplex PCR. This    Assay tests for 66 bacterial and resistance gene targets.    Please refer to the [facility] test directory    at [email or web address] for details.  Organism: Blood Culture PCR (11-12-22 @ 08:04)  Organism: Blood Culture PCR (11-12-22 @ 08:04)      -  Blood PCR Panel: NEG      Method Type: PCR    Culture - Urine (collected 11-10-22 @ 10:44)  Source: Clean Catch Clean Catch (Midstream)  Final Report (11-12-22 @ 00:27):    <10,000 CFU/mL Normal Urogenital Silke            INFECTIOUS DISEASES TESTING  Rapid RVP Result: Detected (11-11-22 @ 10:40)  Procalcitonin, Serum: 0.20 (11-10-22 @ 12:26)  COVID-19 PCR: NotDetec (11-10-22 @ 05:15)  strept    INFLAMMATORY MARKERS  Sedimentation Rate, Erythrocyte: 68 mm/Hr (11-11-22 @ 08:11)  C-Reactive Protein, Serum: 94.5 mg/L (11-11-22 @ 08:11)      RADIOLOGY & ADDITIONAL TESTS:  I have personally reviewed the last available Chest xray  CXR      CT      CARDIOLOGY TESTING  12 Lead ECG:   Ventricular Rate 114 BPM    Atrial Rate 114 BPM    P-R Interval 148 ms    QRS Duration 68 ms    Q-T Interval 334 ms    QTC Calculation(Bazett) 460 ms    P Axis 74 degrees    R Axis 62 degrees    T Axis 63 degrees    Diagnosis Line Sinus tachycardia  Possible Left atrial enlargement  Borderline ECG    Confirmed by elina oliver ([id number]) on 11/10/2022 8:34:45 AM (11-10-22 @ 05:30)      MEDICATIONS  benzocaine 20% Spray 1 Topical three times a day  furosemide   Injectable 40 IV Push daily  influenza   Vaccine 0.5 IntraMuscular once  pantoprazole    Tablet 40 Oral before breakfast  penicillin   G  potassium  IVPB     vancomycin  IVPB 1000 IV Intermittent every 12 hours      WEIGHT  Weight (kg): 68.3 (11-11-22 @ 17:39)      ANTIBIOTICS:  penicillin   G  potassium  IVPB      vancomycin  IVPB 1000 milliGRAM(s) IV Intermittent every 12 hours      All available historical records have been reviewed

## 2022-11-14 ENCOUNTER — INPATIENT (INPATIENT)
Facility: HOSPITAL | Age: 32
LOS: 13 days | Discharge: HOME HEALTH | DRG: 219 | End: 2022-11-28
Attending: SPECIALIST | Admitting: SPECIALIST
Payer: COMMERCIAL

## 2022-11-14 VITALS — HEART RATE: 104 BPM | DIASTOLIC BLOOD PRESSURE: 63 MMHG | SYSTOLIC BLOOD PRESSURE: 105 MMHG

## 2022-11-14 VITALS
HEART RATE: 95 BPM | WEIGHT: 140.43 LBS | OXYGEN SATURATION: 98 % | HEIGHT: 63 IN | SYSTOLIC BLOOD PRESSURE: 96 MMHG | DIASTOLIC BLOOD PRESSURE: 65 MMHG | TEMPERATURE: 99 F | RESPIRATION RATE: 18 BRPM

## 2022-11-14 DIAGNOSIS — R06.02 SHORTNESS OF BREATH: ICD-10-CM

## 2022-11-14 LAB
ANION GAP SERPL CALC-SCNC: 12 MMOL/L — SIGNIFICANT CHANGE UP (ref 7–14)
ANION GAP SERPL CALC-SCNC: 9 MMOL/L — SIGNIFICANT CHANGE UP (ref 7–14)
BLD GP AB SCN SERPL QL: SIGNIFICANT CHANGE UP
BUN SERPL-MCNC: 10 MG/DL — SIGNIFICANT CHANGE UP (ref 10–20)
BUN SERPL-MCNC: 9 MG/DL — LOW (ref 10–20)
CALCIUM SERPL-MCNC: 9 MG/DL — SIGNIFICANT CHANGE UP (ref 8.4–10.5)
CALCIUM SERPL-MCNC: 9.4 MG/DL — SIGNIFICANT CHANGE UP (ref 8.4–10.4)
CHLORIDE SERPL-SCNC: 91 MMOL/L — LOW (ref 98–110)
CHLORIDE SERPL-SCNC: 96 MMOL/L — LOW (ref 98–110)
CO2 SERPL-SCNC: 27 MMOL/L — SIGNIFICANT CHANGE UP (ref 17–32)
CO2 SERPL-SCNC: 30 MMOL/L — SIGNIFICANT CHANGE UP (ref 17–32)
CREAT SERPL-MCNC: 0.6 MG/DL — LOW (ref 0.7–1.5)
CREAT SERPL-MCNC: 1 MG/DL — SIGNIFICANT CHANGE UP (ref 0.7–1.5)
CULTURE RESULTS: SIGNIFICANT CHANGE UP
CULTURE RESULTS: SIGNIFICANT CHANGE UP
EGFR: 121 ML/MIN/1.73M2 — SIGNIFICANT CHANGE UP
EGFR: 76 ML/MIN/1.73M2 — SIGNIFICANT CHANGE UP
GLUCOSE SERPL-MCNC: 105 MG/DL — HIGH (ref 70–99)
GLUCOSE SERPL-MCNC: 99 MG/DL — SIGNIFICANT CHANGE UP (ref 70–99)
HCT VFR BLD CALC: 29.2 % — LOW (ref 37–47)
HCT VFR BLD CALC: 31 % — LOW (ref 37–47)
HGB BLD-MCNC: 10.2 G/DL — LOW (ref 12–16)
HGB BLD-MCNC: 9.5 G/DL — LOW (ref 12–16)
MAGNESIUM SERPL-MCNC: 2 MG/DL — SIGNIFICANT CHANGE UP (ref 1.8–2.4)
MAGNESIUM SERPL-MCNC: 2.2 MG/DL — SIGNIFICANT CHANGE UP (ref 1.8–2.4)
MCHC RBC-ENTMCNC: 26 PG — LOW (ref 27–31)
MCHC RBC-ENTMCNC: 26 PG — LOW (ref 27–31)
MCHC RBC-ENTMCNC: 32.5 G/DL — SIGNIFICANT CHANGE UP (ref 32–37)
MCHC RBC-ENTMCNC: 32.9 G/DL — SIGNIFICANT CHANGE UP (ref 32–37)
MCV RBC AUTO: 79.1 FL — LOW (ref 81–99)
MCV RBC AUTO: 80 FL — LOW (ref 81–99)
NRBC # BLD: 0 /100 WBCS — SIGNIFICANT CHANGE UP (ref 0–0)
NRBC # BLD: 0 /100 WBCS — SIGNIFICANT CHANGE UP (ref 0–0)
PHOSPHATE SERPL-MCNC: 3.8 MG/DL — SIGNIFICANT CHANGE UP (ref 2.1–4.9)
PLATELET # BLD AUTO: 378 K/UL — SIGNIFICANT CHANGE UP (ref 130–400)
PLATELET # BLD AUTO: 492 K/UL — HIGH (ref 130–400)
POTASSIUM SERPL-MCNC: 3.9 MMOL/L — SIGNIFICANT CHANGE UP (ref 3.5–5)
POTASSIUM SERPL-MCNC: 4.1 MMOL/L — SIGNIFICANT CHANGE UP (ref 3.5–5)
POTASSIUM SERPL-SCNC: 3.9 MMOL/L — SIGNIFICANT CHANGE UP (ref 3.5–5)
POTASSIUM SERPL-SCNC: 4.1 MMOL/L — SIGNIFICANT CHANGE UP (ref 3.5–5)
RBC # BLD: 3.65 M/UL — LOW (ref 4.2–5.4)
RBC # BLD: 3.92 M/UL — LOW (ref 4.2–5.4)
RBC # FLD: 14.2 % — SIGNIFICANT CHANGE UP (ref 11.5–14.5)
RBC # FLD: 14.5 % — SIGNIFICANT CHANGE UP (ref 11.5–14.5)
SODIUM SERPL-SCNC: 132 MMOL/L — LOW (ref 135–146)
SODIUM SERPL-SCNC: 133 MMOL/L — LOW (ref 135–146)
SPECIMEN SOURCE: SIGNIFICANT CHANGE UP
VANCOMYCIN TROUGH SERPL-MCNC: 6.7 UG/ML — SIGNIFICANT CHANGE UP (ref 5–10)
WBC # BLD: 14.58 K/UL — HIGH (ref 4.8–10.8)
WBC # BLD: 19.33 K/UL — HIGH (ref 4.8–10.8)
WBC # FLD AUTO: 14.58 K/UL — HIGH (ref 4.8–10.8)
WBC # FLD AUTO: 19.33 K/UL — HIGH (ref 4.8–10.8)

## 2022-11-14 PROCEDURE — 99222 1ST HOSP IP/OBS MODERATE 55: CPT

## 2022-11-14 PROCEDURE — 76705 ECHO EXAM OF ABDOMEN: CPT | Mod: 26

## 2022-11-14 PROCEDURE — 99233 SBSQ HOSP IP/OBS HIGH 50: CPT

## 2022-11-14 RX ORDER — METOPROLOL TARTRATE 50 MG
12.5 TABLET ORAL
Refills: 0 | Status: DISCONTINUED | OUTPATIENT
Start: 2022-11-15 | End: 2022-11-14

## 2022-11-14 RX ORDER — SODIUM CHLORIDE 9 MG/ML
3 INJECTION INTRAMUSCULAR; INTRAVENOUS; SUBCUTANEOUS EVERY 8 HOURS
Refills: 0 | Status: DISCONTINUED | OUTPATIENT
Start: 2022-11-14 | End: 2022-11-22

## 2022-11-14 RX ORDER — GENTAMICIN SULFATE 40 MG/ML
70 VIAL (ML) INJECTION EVERY 8 HOURS
Refills: 0 | Status: DISCONTINUED | OUTPATIENT
Start: 2022-11-14 | End: 2022-11-21

## 2022-11-14 RX ORDER — PENICILLIN G POTASSIUM 5000000 [IU]/1
4 POWDER, FOR SOLUTION INTRAMUSCULAR; INTRAPLEURAL; INTRATHECAL; INTRAVENOUS EVERY 6 HOURS
Refills: 0 | Status: DISCONTINUED | OUTPATIENT
Start: 2022-11-14 | End: 2022-11-15

## 2022-11-14 RX ORDER — METOPROLOL TARTRATE 50 MG
12.5 TABLET ORAL ONCE
Refills: 0 | Status: COMPLETED | OUTPATIENT
Start: 2022-11-14 | End: 2022-11-14

## 2022-11-14 RX ORDER — ONDANSETRON 8 MG/1
4 TABLET, FILM COATED ORAL ONCE
Refills: 0 | Status: COMPLETED | OUTPATIENT
Start: 2022-11-14 | End: 2022-11-14

## 2022-11-14 RX ORDER — VANCOMYCIN HCL 1 G
1250 VIAL (EA) INTRAVENOUS ONCE
Refills: 0 | Status: COMPLETED | OUTPATIENT
Start: 2022-11-14 | End: 2022-11-14

## 2022-11-14 RX ORDER — GENTAMICIN SULFATE 40 MG/ML
70 VIAL (ML) INJECTION EVERY 8 HOURS
Refills: 0 | Status: DISCONTINUED | OUTPATIENT
Start: 2022-11-14 | End: 2022-11-14

## 2022-11-14 RX ORDER — FUROSEMIDE 40 MG
60 TABLET ORAL ONCE
Refills: 0 | Status: COMPLETED | OUTPATIENT
Start: 2022-11-14 | End: 2022-11-14

## 2022-11-14 RX ORDER — METOPROLOL TARTRATE 50 MG
12.5 TABLET ORAL EVERY 12 HOURS
Refills: 0 | Status: DISCONTINUED | OUTPATIENT
Start: 2022-11-14 | End: 2022-11-22

## 2022-11-14 RX ADMIN — Medication 10 MILLILITER(S): at 03:08

## 2022-11-14 RX ADMIN — Medication 12.5 MILLIGRAM(S): at 11:22

## 2022-11-14 RX ADMIN — Medication 60 MILLIGRAM(S): at 13:23

## 2022-11-14 RX ADMIN — Medication 40 MILLIGRAM(S): at 06:06

## 2022-11-14 RX ADMIN — PENICILLIN G POTASSIUM 100 MILLION UNIT(S): 5000000 POWDER, FOR SOLUTION INTRAMUSCULAR; INTRAPLEURAL; INTRATHECAL; INTRAVENOUS at 15:29

## 2022-11-14 RX ADMIN — Medication 12.5 MILLIGRAM(S): at 23:24

## 2022-11-14 RX ADMIN — ONDANSETRON 4 MILLIGRAM(S): 8 TABLET, FILM COATED ORAL at 19:36

## 2022-11-14 RX ADMIN — PENICILLIN G POTASSIUM 100 MILLION UNIT(S): 5000000 POWDER, FOR SOLUTION INTRAMUSCULAR; INTRAPLEURAL; INTRATHECAL; INTRAVENOUS at 02:26

## 2022-11-14 RX ADMIN — PENICILLIN G POTASSIUM 100 MILLION UNIT(S): 5000000 POWDER, FOR SOLUTION INTRAMUSCULAR; INTRAPLEURAL; INTRATHECAL; INTRAVENOUS at 08:38

## 2022-11-14 RX ADMIN — Medication 12.5 MILLIGRAM(S): at 17:26

## 2022-11-14 RX ADMIN — Medication 166.67 MILLIGRAM(S): at 10:55

## 2022-11-14 RX ADMIN — PANTOPRAZOLE SODIUM 40 MILLIGRAM(S): 20 TABLET, DELAYED RELEASE ORAL at 06:06

## 2022-11-14 RX ADMIN — Medication 1 SPRAY(S): at 13:23

## 2022-11-14 NOTE — PROGRESS NOTE ADULT - TIME BILLING
Counseled patient about diagnostic testing and treatment plan. All questions answered.
Counseled patient about diagnostic testing and treatment plan. All questions answered.
Total time spent to complete patient's bedside assessment, physical examination, review medical chart including labs & imaging, discuss medical plan of care with housestaff was more than 35 minutes
Chart review, bedside evaluation, coordination of care with CTSx and ID.
Chart review, bedside evaluation, coordination of care with CTSx and ID.
Reviewing all diagnostic imaging and discussing with patient, coordinating care with ID and CTS.

## 2022-11-14 NOTE — H&P ADULT - ASSESSMENT
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 Initially patient presented to the [facility] ED w chronic cough, SOB, hematemesis & hemoptysis. Pt complained of  chronic cough for the past 2 months, initially non-productive, now slightly productive associated with SOB. Initially she was able to climb one flight of stairs, but the SOB & cough have worsened over the past 3 weeks, now claims that she would be short of breath if she walked 5ft She was seen by her PCP & pulmonologist for the cough & sob and was advised to use inhalers and azithromycin with no relief. Wednesda night 11/9 into Thursday AM 11/10 her cough worsened and also had an episode of vomiting, found 10-15 ml of blood mixed with the vomitus. Her cough episode later showed sputum w blood streaks. Claims that this was new, and never happened in the past 2 months. Patient was admited to [facility]. Patient was found to be in Acute HF and an echo was done showing Severe mitral valve regurgitation and . Patient was then transferred to [facility] to 11/14 under Dr. Denson for Evaluation of MV regurgitation/endocarditis and VSD. Patient currently admits to coughing that has only been helped by Lasix that was being administered at [facility].  most Recent travel in may to [facility] currently denies Chest pain, fevers, chills, diarrhea, headaches, urinary or bowel changes.      11/14 Patient seen and examined at bedside. All labs and imaging to be reviewed by Dr. Doll. OR date TBD, continue with Abx regimen on at [facility], reconsult ID in AM. Consult HF?. Preop order workup initiated. Dr. Doll to see patient

## 2022-11-14 NOTE — H&P ADULT - NSHPLABSRESULTS_GEN_ALL_CORE
< from: Transesophageal Echocardiogram (11.11.22 @ 16:20) >    PHYSICIAN INTERPRETATION:  Left Ventricle: Normal left ventricular size and wall thicknesses, with   normal systolic and diastolic function. Left ventricular ejection   fraction, by visual estimation, is 60 to 65%.  Right Ventricle: Normal right ventricular size and function.  Left Atrium: Mild to moderately enlarged left atrium. No left atrial   appendage thrombus is seen.  Mitral Valve: No evidence of mitral valve stenosis. Severe mitral valve   regurgitation is seen.  Tricuspid Valve: Trivial tricuspid regurgitation is visualized.  Aortic Valve: Normal trileaflet aortic valve with normal opening. The   aortic valve is trileaflet. No aortic stenosis. No evidence of aortic   valve regurgitation is seen.  Pulmonic Valve: The pulmonic valve was not well visualized. No indication   of pulmonic valve regurgitation.  Aorta: The aortic root is normal in size and structure.  Shunts: There is no evidence of a patent foramen ovale. There is no   evidence of any atrial septal defect.  SPECTRAL DOPPLER ANALYSIS:      [id number] Joe Byrne, Electronically signed on 11/11/2022 at 6:28:26 PM            *** Final (Amended) ***      Discussed with Dr Rooney (CT surgery) and further reviewed images. On  mid-esophageal view at 120, cannot rule out small vegetation on the   anterior MV leaflet.  Joe Byrne  Electronically Amended 11/14/2022, 10:44 AM    < end of copied text >

## 2022-11-14 NOTE — H&P ADULT - PROBLEM SELECTOR PLAN 2
c/w lopressor 12.5 MG BID  All labs and imaging to be reviewed by Dr. Doll.  Dr. Doll to see patient

## 2022-11-14 NOTE — PROGRESS NOTE ADULT - REASON FOR ADMISSION
hematemesis + hemoptysis + sob + chronic cough

## 2022-11-14 NOTE — PROGRESS NOTE ADULT - SUBJECTIVE AND OBJECTIVE BOX
ELBA TO  32y, Female    All available historical data reviewed    OVERNIGHT EVENTS:  no fevers  still with cough, blood tinged    ROS:  General: Denies rigors, nightsweats  HEENT: Denies headache, rhinorrhea, sore throat, eye pain  CV: Denies CP, palpitations  PULM:  hemoptysis  GI: Denies hematemesis, hematochezia, melena  : Denies discharge, hematuria  MSK: Denies arthralgias, myalgias  SKIN: Denies rash, lesions  NEURO: Denies paresthesias, weakness  PSYCH: Denies depression, anxiety    VITALS:  T(F): 98.2, Max: 98.8 (11-13-22 @ 16:18)  HR: 95  BP: 101/60  RR: 18Vital Signs Last 24 Hrs  T(C): 36.8 (14 Nov 2022 08:00), Max: 37.1 (13 Nov 2022 16:18)  T(F): 98.2 (14 Nov 2022 08:00), Max: 98.8 (13 Nov 2022 16:18)  HR: 95 (14 Nov 2022 08:00) (91 - 103)  BP: 101/60 (14 Nov 2022 08:00) (92/53 - 103/70)  BP(mean): 76 (14 Nov 2022 08:00) (67 - 78)  RR: 18 (14 Nov 2022 08:00) (18 - 18)  SpO2: 95% (14 Nov 2022 05:39) (95% - 95%)    Parameters below as of 14 Nov 2022 05:39  Patient On (Oxygen Delivery Method): room air        TESTS & MEASUREMENTS:                        9.5    14.58 )-----------( 378      ( 14 Nov 2022 05:24 )             29.2     11-14    132<L>  |  96<L>  |  9<L>  ----------------------------<  105<H>  3.9   |  27  |  0.6<L>    Ca    9.0      14 Nov 2022 05:24  Phos  3.8     11-14  Mg     2.2     11-14    TPro  7.1  /  Alb  3.6  /  TBili  0.6  /  DBili  x   /  AST  111<H>  /  ALT  64<H>  /  AlkPhos  162<H>  11-13    LIVER FUNCTIONS - ( 13 Nov 2022 17:56 )  Alb: 3.6 g/dL / Pro: 7.1 g/dL / ALK PHOS: 162 U/L / ALT: 64 U/L / AST: 111 U/L / GGT: x             Culture - Blood (collected 11-12-22 @ 11:00)  Source: .Blood Blood  Preliminary Report (11-14-22 @ 01:02):    No growth to date.    Culture - Blood (collected 11-11-22 @ 08:11)  Source: .Blood None  Gram Stain (11-13-22 @ 00:46):    Growth in anaerobic bottle: Gram positive cocci in pairs  Final Report (11-13-22 @ 17:15):    Growth in anaerobic bottle: Most closely resembling Gemella species    "Susceptibilities not performed"    Culture - Blood (collected 11-10-22 @ 16:54)  Source: .Blood None  Gram Stain (11-13-22 @ 08:24):    Growth in aerobic bottle: Gram positive cocci in pairs    Growth in anaerobic bottle: Gram positive cocci in pairs  Preliminary Report (11-13-22 @ 23:35):    Growth in aerobic bottle: Most closely resembling Gemella species    "Susceptibilities not performed"    Growth in anaerobic bottle: Gram positive cocci in pairs    Culture - Blood (collected 11-10-22 @ 12:26)  Source: .Blood None  Gram Stain (11-13-22 @ 10:49):    Growth in anaerobic bottle: Gram positive cocci in pairs    Growth in aerobic bottle: Gram Positive Cocci in Clusters  Final Report (11-14-22 @ 09:10):    Growth in aerobic and anaerobic bottles: Gram positive cocci in pairs    Most closely resembling Gemella species    "Susceptibilities not performed"    Culture - Blood (collected 11-10-22 @ 12:26)  Source: .Blood None  Gram Stain (11-13-22 @ 21:39):    Growth in anaerobic bottle: Gram positive cocci in pairs    Growth in aerobic bottle: Gram positive cocci in pairs  Final Report (11-14-22 @ 11:41):    Growth in aerobic and anaerobic bottles: Gram positive cocci in pairs    Most closely resembling Gemella species    "Susceptibilities not performed"    ***Blood Panel PCR results on this specimen are available    approximately 3 hours after the Gram stain result.***    Gram stain, PCR, and/or culture results may not always    correspond due to difference in methodologies.    ************************************************************    This PCR assay was performed by multiplex PCR. This    Assay tests for 66 bacterial and resistance gene targets.    Please refer to the [facility] test directory    at [email or web address] for details.  Organism: Blood Culture PCR (11-13-22 @ 21:40)  Organism: Blood Culture PCR (11-13-22 @ 21:40)      -  Blood PCR Panel: NEG      Method Type: PCR    Culture - Urine (collected 11-10-22 @ 10:44)  Source: Clean Catch Clean Catch (Midstream)  Final Report (11-12-22 @ 00:27):    <10,000 CFU/mL Normal Urogenital Silke            RADIOLOGY & ADDITIONAL TESTS:  Personal review of radiological diagnostics performed  Echo and EKG results noted when applicable.     MEDICATIONS:  acetaminophen     Tablet .. 650 milliGRAM(s) Oral every 6 hours PRN  benzocaine 20% Spray 1 Spray(s) Topical three times a day  guaifenesin/dextromethorphan Oral Liquid 10 milliLiter(s) Oral every 4 hours PRN  influenza   Vaccine 0.5 milliLiter(s) IntraMuscular once  metoprolol tartrate 12.5 milliGRAM(s) Oral once  pantoprazole    Tablet 40 milliGRAM(s) Oral before breakfast  penicillin   G  potassium  IVPB      penicillin   G  potassium  IVPB 4 Million Unit(s) IV Intermittent every 6 hours  sodium chloride 0.65% Nasal 1 Spray(s) Both Nostrils three times a day PRN      ANTIBIOTICS:  penicillin   G  potassium  IVPB      penicillin   G  potassium  IVPB 4 Million Unit(s) IV Intermittent every 6 hours

## 2022-11-14 NOTE — H&P ADULT - PROBLEM SELECTOR PLAN 1
TAVO showing: Severe mitral valve regurgitation 2/2 flail leaflet with possible small vegetation on the anterior MV leaflet that could not be ruled out.   + Gram positive Cocci on BCx 11/11. rpt 11/12 and 11/13 neg to date.   F/U BCx 11/14 at [facility] x 2  Preop workup in progress  OR date TBD  c/w lopressor 12.5 MG BID  Continue with IV Abx per [facility] ID: PCN 4 million units every 6h IV and Add Gentamicin 70 mg iv q8h  c/w Lasix 60 IV push  All labs and imaging to be reviewed by Dr. Doll.  Dr. Doll to see patient

## 2022-11-14 NOTE — H&P ADULT - HISTORY OF PRESENT ILLNESS
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 Initially patient presented to the [facility] ED w chronic cough, SOB, hematemesis & hemoptysis. Pt complained of  chronic cough for the past 2 months, initially non-productive, now slightly productive associated with SOB. Initially she was able to climb one flight of stairs, but the SOB & cough have worsened over the past 3 weeks, now claims that she would be short of breath if she walked 5ft She was seen by her PCP & pulmonologist for the cough & sob and was advised to use inhalers and azithromycin with no relief. Wednesda night 11/9 into Thursday AM 11/10 her cough worsened and also had an episode of vomiting, found 10-15 ml of blood mixed with the vomitus. Her cough episode later showed sputum w blood streaks. Claims that this was new, and never happened in the past 2 months. Patient was admited to [facility]. Patient was found to be in Acute HF and an echo was done showing Severe mitral valve regurgitation and . Patient was then transferred to [facility] to 11/14 under Dr. Denson for Evaluation of MV regurgitation/endocarditis and VSD. Patient currently admits to coughing that has only been helped by Lasix that was being administered at [facility].  most Recent travel in may to [facility] currently denies Chest pain, fevers, chills, diarrhea, headaches, urinary or bowel changes.

## 2022-11-14 NOTE — H&P ADULT - NSHPPHYSICALEXAM_GEN_ALL_CORE
General: NAD  HEENT:  NC/AT  Neuro: A&Ox4, gait steady, speech clear, no focal deficits noted  Respiratory: BS CTA b/l, no wheezes, rales or rhonchi noted  Cardiovascular: RRR, (+) S1/S2,   GI: Abd soft, NT/ND, (+) BSx4Q (+) BM  Peripheral Vascular:  no LE edema b/l, 2+ peripheral pulses b/l, no varicosities/PVD noted  Musculoskeletal: B/L UE and LE 5/5 strength   Psychiatric: Normal mood, normal affect observed  Skin: Normal exam to inspection and palpation. no bleeding, no hematoma.

## 2022-11-14 NOTE — PATIENT PROFILE ADULT - HAVE YOU EVER HAD A SEVERE ALLERGIC REACTION TO SOMETHING OTHER THAN A VACCINE OR INJECTABLE THERAPY SUCH AS FOOD, PET, VENOM, ENVIRONMENTAL OR ORAL MEDICATION? (E.G. REACTIONS REQUIRING TREATMENT WITH EPINEPHRINE OR HOSPITALIZATION)
-- Message is from the [facility]--    Requesting pediatric physical form to be completed    Appointment History  Date of last PPH / physical appointment: 08/28/19    Provider: Mary Farley  Patient is scheduled 8/29.     Preferred Delivery Method   Call when ready for pickup - phone number to notify: [phone]    Caller Information       Type Contact Phone    07/23/2019 03:46 PM Phone (Incoming) ANTONIA MALDONADO (Mother) [phone] (M)          Alternative phone number:     Turnaround time given to caller:   \"This message will be sent to [state Provider's full name]. The clinical team will review your request as soon as they have received your message. Typically, forms are completed in 5-7 business days.  If they are unable to complete your request, you will be contacted and an appointment may need to be scheduled.\"   No

## 2022-11-14 NOTE — PROGRESS NOTE ADULT - ASSESSMENT
Assessment:  31 yo F w PMH of congenital VSD (uncorrected) presented to the ED w chronic cough, SOB, hematemesis & hemoptysis 11/10, found to have severe mitral regurgitation and VSD with left to right shunting on TAVO. Now admitted to 4T for optimization prior to mitral valve and VSD repair.     Problems discussed and associated plan:  #Gram +cocci on BC  - repeat blood culture until clearance   -BCx from 11/12 most closely resembling Gemella species   - Start vanco 1gm q12h once BC drawn\  - TTE reread by Dr. Jordan: vegetation found  - ID reccs appreciated: GPC pairs anaerobic started on PCN 4 million units q6 IV, Vanc 1g BID IV (please check trough 30 min before 4th dose (range 15-20 if >20 HOLD vanc and order AM random vanco level)   - Started on Penicillin 4 mil U for endocarditis 11/13  - pre 4th dose vanc trough 6.7, increased vanc to 1250 U, awaiting ID recc to move forward     #Severe MR with flail anterior leaflet   - VSD/MVR once medically optimized and bcx clear  - keep SBP <100  - CTS reccs appreciated     #New onset CHF, BNP 2247   -Lasix 40mg IVP, changed to Lasix 40 mg IVP BID (11/14)   -daily standing weights  - strict I&O    #Hemoptysis - resolved    #Chronic cough/persistent nausea  - guaifenesin DM prn  - zofran 4mg IVP prn  - Entero//Rhinovirus + (Isolation precautions not needed at this time)     FULL CODE  DVT PPX: compression leg devices  Please contact me with any questions or concerns at [phone].      Please contact me with any questions or concerns at [phone]. Assessment:  33 yo F w PMH of congenital VSD (uncorrected) presented to the ED w chronic cough, SOB, hematemesis & hemoptysis 11/10, found to have severe mitral regurgitation and VSD with left to right shunting on TAVO. Now admitted to T for optimization prior to mitral valve and VSD repair.     Problems discussed and associated plan:  #Gram +cocci on BC  - repeat blood culture until clearance   -BCx from 11/12 most closely resembling Gemella species, Bcx 11/13 no growth to date, please f/u   - Start vanco 1gm q12h once BC drawn\  - TTE reread by Dr. Jordan: vegetation found  - ID reccs appreciated: GPC pairs anaerobic started on PCN 4 million units q6 IV, Vanc 1g BID IV (please check trough 30 min before 4th dose (range 15-20 if >20 HOLD vanc and order AM random vanco level)   - Started on Penicillin 4 mil U for endocarditis 11/13  - pre 4th dose vanc trough 6.7, increased vanc to 1250 U, awaiting ID recc to move forward     #Severe MR with flail anterior leaflet   - VSD/MVR once medically optimized and bcx clear  - keep SBP <100  - CTS reccs appreciated: awaiting clear bcx, considering transferring to a hospital that specializes in adult congenital heart defects, will reassess once medically optimized  - Started on Lopressor 12.5 mg BID 11/14    #New onset CHF, BNP 2247   -Started on Lasix 60 mg BID IVP 11/14, f/u pm labs   -daily standing weights  - strict I&O  - replete K and Mg as needed     #Hemoptysis  - Hgb and HCt down trending since admission  - keep active type and screen     #Chronic cough/persistent nausea  - guaifenesin DM prn  - zofran 4mg IVP prn  - Entero//Rhinovirus + (Isolation precautions not needed at this time)     FULL CODE  DVT PPX: compression leg devices  Please contact me with any questions or concerns at [phone].      Please contact me with any questions or concerns at [phone]. Assessment:  31 yo F w PMH of congenital VSD (uncorrected) presented to the ED w chronic cough, SOB, hematemesis & hemoptysis 11/10, found to have severe mitral regurgitation and VSD with left to right shunting on TAVO. Now admitted to T for optimization prior to mitral valve and VSD repair.     Problems discussed and associated plan:  #Gram +cocci on BC  - repeat blood culture until clearance   -BCx from 11/12 most closely resembling Gemella species, Bcx 11/13 no growth to date, please f/u   - Start vanco 1gm q12h once BC drawn\  - TTE reread by Dr. Jordan: vegetation found  - ID reccs appreciated: GPC pairs anaerobic started on PCN 4 million units q6 IV, Vanc 1g BID IV (please check trough 30 min before 4th dose (range 15-20 if >20 HOLD vanc and order AM random vanco level)   - Started on Penicillin 4 mil U for endocarditis 11/13  - pre 4th dose vanc trough 6.7, increased vanc to 1250 U, awaiting ID recc to move forward     #Severe MR with flail anterior leaflet   - VSD/MVR once medically optimized and bcx clear  - keep SBP <100  - CTS reccs appreciated: awaiting clear bcx, considering transferring to a hospital that specializes in adult congenital heart defects, will reassess once medically optimized  - Started on Lopressor 12.5 mg BID 11/14    #New onset CHF, BNP 2247   -Started on Lasix 60 mg BID IVP 11/14, f/u pm labs   -daily standing weights  - strict I&O  - replete K and Mg as needed     #Transamnitis (new)  - elevated LFT on 11/13 labs with abdominal tenderness in all 4 quadrants   - Abdominal US pending   - consider GI consult upon results     #Hemoptysis  - Hgb and HCt down trending since admission  - keep active type and screen     #Chronic cough/persistent nausea  - guaifenesin DM prn  - zofran 4mg IVP prn  - Entero//Rhinovirus + (Isolation precautions not needed at this time)     FULL CODE  DVT PPX: compression leg devices  Please contact me with any questions or concerns at [phone].      Please contact me with any questions or concerns at [phone]. Assessment:  33 yo F w PMH of congenital VSD (uncorrected) presented to the ED w chronic cough, SOB, hematemesis & hemoptysis 11/10, found to have severe mitral regurgitation and VSD with left to right shunting on TAVO. Now admitted to T for optimization prior to mitral valve and VSD repair.     Problems discussed and associated plan:  #Gram +cocci on BC  - repeat blood culture until clearance   -BCx from 11/12 most closely resembling Gemella species, Bcx 11/13 no growth to date, please f/u   - Start vanco 1gm q12h once BC drawn\  - TTE reread by Dr. Jordan: vegetation found  - ID reccs appreciated: GPC pairs anaerobic started on PCN 4 million units q6 IV, Vanc 1g BID IV (please check trough 30 min before 4th dose (range 15-20 if >20 HOLD vanc and order AM random vanco level)   - Started on Penicillin 4 mil U for endocarditis 11/13  - pre 4th dose vanc trough 6.7, increased vanc to 1250 U, awaiting ID recc to move forward     #Severe MR with flail anterior leaflet   - VSD/MVR once medically optimized and bcx clear  - keep SBP <100  - CTS reccs appreciated: awaiting clear bcx, considering transferring to a hospital that specializes in adult congenital heart defects, will reassess once medically optimized  - Started on Lopressor 12.5 mg BID 11/14    #New onset CHF, BNP 2247   -Started on Lasix 60 mg BID IVP 11/14, f/u pm labs   -daily standing weights  - strict I&O  - replete K and Mg as needed     #Transamnitis (new)  - elevated LFT on 11/13 labs with abdominal tenderness in all 4 quadrants   - Abdominal US pending   - consider GI consult upon results     #Hemoptysis  - Hgb and HCt down trending since admission  - keep active type and screen     #Chronic cough/persistent nausea  - guaifenesin DM prn  - zofran 4mg IVP prn  - Entero//Rhinovirus + (Isolation precautions not needed at this time)     FULL CODE  DVT PPX: compression leg devices    Please contact me with any questions or concerns at [phone].

## 2022-11-14 NOTE — H&P ADULT - BIRTH SEX
Assessment    1  Encounter for preventive health examination (V70 0) (Z00 00)   2  GERD (gastroesophageal reflux disease) (530 81) (K21 9)   3  BMI 36 0-36 9,adult (V85 36) (Z68 36)   4  Allergic rhinitis (477 9) (J30 9)   5  Obesity (278 00) (E66 9)   6  Obsessive compulsive disorder (300 3) (F42)   7  Encounter for hearing evaluation (V72 19) (Z01 10)    Plan  Allergic rhinitis    · Fluticasone Propionate 50 MCG/ACT Nasal Suspension; 2 puffs/nostril/day   · Montelukast Sodium 10 MG Oral Tablet; Take 1 tablet daily  Change in hearing    · SCREEN AUDIOGRAM- POC; Status:Complete;   Done: 22PSM5656 12:00AM  Change in hearing, Encounter for hearing evaluation    · TYMPANOMETRY- POC; Status:Complete;   Done: 64XIF5577 12:00AM  GERD (gastroesophageal reflux disease)    · Omeprazole 20 MG Oral Capsule Delayed Release; 1 every day  Obesity    · Begin a limited exercise program ; Status:Complete;   Done: 62EVU4147   · Eat a low fat and low cholesterol diet ; Status:Complete;   Done: 12ZHN7001   · Shared Decision Making Aid given; Status:Complete;   Done: 39JOY0357   · Some eating tips that can help you lose weight ; Status:Complete;   Done: 02DJH4370   · We recommend that you bring your body mass index down to 26 ; Status:Complete;    Done: 13FFH9328    Discussion/Summary  Impression: health maintenance visit, normal tymp/audio  Currently, he eats a poor diet and has an inadequate exercise regimen  Advice and education were given regarding nutrition, aerobic exercise, weight bearing exercise and weight loss  N[place] may be an option to assist with weight loss in the future  The patient was counseled regarding risk factor reductions, impressions  Chief Complaint  pt present for CPE   ac/cma      History of Present Illness  HM, Adult Male: The patient is being seen for a health maintenance and feels like he has reduced hearing, often asks people to repeat themselves, [facility] and uses sudafed Female prn and singulair was given evaluation  General Health: The patient's health since the last visit is described as good  Immunizations status: up to date  Lifestyle:  He does not have a healthy diet  He has weight concerns  He does not exercise regularly  He does not use tobacco  He denies alcohol use  He denies drug use  eats very badly admittedly  Screening:   HPI: gerd on 20mg omeprazole for months      Review of Systems    Cardiovascular: no chest pain  Respiratory: no shortness of breath  Gastrointestinal: no abdominal pain  Active Problems    1  Acne (706 1) (L70 9)   2  ADD (attention deficit disorder) without hyperactivity (314 00) (F90 0)   3  Allergic rhinitis (477 9) (J30 9)   4  BMI 36 0-36 9,adult (V85 36) (Z68 36)   5  External Hemorrhoids (455 3)   6  GERD (gastroesophageal reflux disease) (530 81) (K21 9)   7  Immunization due (V05 9) (Z23)   8  Obesity (278 00) (E66 9)   9  Obsessive compulsive disorder (300 3) (F42)   10  Oral aphthae (528 2) (K12 0)   11  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)   12  Screening for cardiovascular condition (V81 2) (Z13 6)   13  Screening for diabetes mellitus (DM) (V77 1) (Z13 1)   14  Screening for lipid disorders (V77 91) (Z13 220)   15  Sleep apnea (780 57) (G47 30)   16  Thyroid disorder screening (V77 0) (Z13 29)   17   Well adult on routine health check (V70 0) (Z00 00)    Past Medical History    · History of Acute maxillary sinusitis (461 0) (J01 00)   · History of Acute upper respiratory infection (465 9) (J06 9)   · History of Foreign Body In The Foot (917 6)   · History of acne (V13 3) (Z87 2)   · History of acute bronchitis (V12 69) (Z87 09)   · History of acute gastritis (V12 70) (Z87 19)   · History of acute pharyngitis (V12 69) (Z87 09)   · History of acute sinusitis (V12 69) (Z87 09)   · History of acute sinusitis (V12 69) (Z87 09)   · History of allergic rhinitis (V12 69) (Z87 09)   · History of allergic rhinitis (V12 69) (Z87 09)   · History of depression (V11 8) (Z86 59)   · History of epistaxis (V12 69) (Z87 898)   · History of impacted cerumen (V12 49) (Z86 69)   · History of nausea and vomiting (V12 79) (Z87 898)   · History of tinea corporis (V12 09) (Z86 19)   · History of Impacted cerumen, unspecified laterality (380 4) (H61 20)   · History of Infective otitis externa, unspecified laterality (380 10) (H60 399)   · History of Joint pain, knee (719 46) (M25 569)   · History of Noninfectious Dermatological Conditions (709 9)   · History of Otitis media, unspecified laterality   · History of Otitis media, unspecified laterality    Surgical History    · Denied: History of Reported Prior Surgical / Procedural History    Family History  Mother    · Family history of Allergic Rhinitis  Father    · Family history of Gout (V18 19)   · Family history of Hypertension (V17 49)  Family History    · Family history of Cancer   · Family history of Diabetes Mellitus (V18 0)    Social History    · Never A Smoker    Current Meds   1  Claritin 10 MG Oral Capsule; 1 DAILY; Therapy: (Recorded:08Jun2016) to Recorded   2  Fluticasone Propionate 50 MCG/ACT Nasal Suspension; USE TWO SPRAYS IN EACH   NOSTRIL DAILY; Therapy: 09Cbd7796 to (Last Katharina Hodgkins)  Requested for: 67PQW5556 Ordered   3  FluvoxaMINE Maleate  MG Oral Capsule Extended Release 24 Hour; 1 Every Day   At Bedtime; Therapy: 60Ygw2658 to (Last Rx:12Oct2013)  Requested for: 50Tvz1915 Ordered   4  Triamcinolone Acetonide 0 1 % Mouth/Throat Paste; APPLY SPARINGLY 3 TIMES A DAY; Therapy: 20UCM1668 to (Evaluate:20Jun2016)  Requested for: 49MTF9662; Last   Rx:08Jun2016 Ordered    Allergies    1  Penicillins   2   Sulfa Drugs    Vitals   Recorded: 75NIC8542 08:57AM   Temperature 97 7 F   Heart Rate 70   Respiration 20   Systolic 680   Diastolic 88   Height 5 ft 6 in   Weight 231 lb    BMI Calculated 37 28   BSA Calculated 2 13     Physical Exam    Constitutional   General appearance: No acute distress, well appearing and well nourished  Eyes   Conjunctiva and lids: No erythema, swelling or discharge  Pupils and irises: Equal, round, reactive to light  Ears, Nose, Mouth, and Throat   External inspection of ears and nose: Normal     Otoscopic examination: Tympanic membranes translucent with normal light reflex  Canals patent without erythema  Hearing: Normal     Nasal mucosa, septum, and turbinates: Normal without edema or erythema  Lips, teeth, and gums: Normal, good dentition  Oropharynx: Normal with no erythema, edema, exudate or lesions  Neck   Neck: Supple, symmetric, trachea midline, no masses  Thyroid: Normal, no thyromegaly  Pulmonary   Respiratory effort: No increased work of breathing or signs of respiratory distress  Auscultation of lungs: Clear to auscultation  Cardiovascular   Auscultation of heart: Normal rate and rhythm, normal S1 and S2, no murmurs  Carotid pulses: 2+ bilaterally  Pedal pulses: 2+ bilaterally  Examination of extremities for edema and/or varicosities: Normal     Abdomen   Abdomen: Abnormal   The abdomen was rounded and obese  Liver and spleen: No hepatomegaly or splenomegaly  Examination for hernias: No hernias appreciated  Anus, perineum, and rectum: Normal sphincter tone, no masses, no prolapse  Genitourinary   Scrotal contents: Normal testes, no masses  Penis: Normal, no lesions  Lymphatic   Palpation of lymph nodes in neck: No lymphadenopathy  Palpation of lymph nodes in axillae: No lymphadenopathy  Palpation of lymph nodes in groin: No lymphadenopathy  Palpation of lymph nodes in other areas: No lymphadenopathy  Musculoskeletal   Gait and station: Normal     Inspection/palpation of digits and nails: Normal without clubbing or cyanosis  Inspection/palpation of joints, bones, and muscles: Normal     Skin   Skin and subcutaneous tissue: Normal without rashes or lesions      Palpation of skin and subcutaneous tissue: Normal turgor  Neurologic   Reflexes: 2+ and symmetric  Sensation: No sensory loss  Psychiatric   Judgment and insight: Normal     Mood and affect: Normal   stabel ocd  Procedure    Procedure: Indication: routine screening  Inforrmation supplied by a Snellen chart     Results: 20/15 in both eyes with corrective device, 20/20 in the right eye with corrective device, 20/20 in the left eye with corrective device      Signatures   Electronically signed by : Flavia Freeman DO; Jul 8 2016 10:55PM EST                       (Author)

## 2022-11-14 NOTE — H&P ADULT - PATIENT'S GENDER IDENTITY
Met with patient at bedside regarding d/c plan.  Informed patient that she will discharge with HH and home oxygen; verbalized understanding.  Questioned patient if someone will be staying with her upon discharge.  Patient stated that she will be going to her daughter's home in [facility].  Informed patient that [employer] no longer has a travel benefit therefore if she goes to [place] she can't have HH services and also that since the concentrator will be delivered to her home in [place] someone will need to pick it up and bring it to [place]; verbalized understanding.  Patient is considering staying with her Mom in [place] or having one of her sisters stay with her at her own place in [place].  Patient will make decision prior to discharge.   Female

## 2022-11-14 NOTE — PROGRESS NOTE ADULT - SUBJECTIVE AND OBJECTIVE BOX
Chief complaint: Patient is a 32y old  Female who presents with a chief complaint of hematemesis + hemoptysis + sob + chronic cough (13 Nov 2022 14:32)    Interval history:  11/12 bcx resulted closely resembling Gemella Species   Vanc trough (pre 4th dose): 6.7, increased Vancomycin to 1250 from 1000 units  Increased Lasix 40 mg from daily to BID, IN: 791 mL / OUT: 0 mL / NET: 791 mL  SBP remained <100 overnight  One episode of vomiting overnight, Zofran given, QTC normal   Persistent cough remains, afebrile   Review of systems: A complete 10-point review of systems was obtained and is negative except as stated in the interval history.    Vitals:  T(F): 98.2, Max: 98.8 (11-13 @ 16:18)  HR: 95 (91 - 103)  BP: 101/60 (92/53 - 103/70)  RR: 18 (18 - 18)  SpO2: 95% (95% - 98%)    Ins & outs:     11-11 @ 07:01  -  11-12 @ 07:00  --------------------------------------------------------  IN: 60 mL / OUT: 400 mL / NET: -340 mL    11-12 @ 07:01 - 11-13 @ 07:00  --------------------------------------------------------  IN: 970 mL / OUT: 800 mL / NET: 170 mL    11-13 @ 07:01  -  11-14 @ 07:00  --------------------------------------------------------  IN: 791 mL / OUT: 0 mL / NET: 791 mL      Weight trend:  Weight (kg): 68.3 (11-11)    Physical exam:  General: No apparent distress  HEENT: Anicteric sclera. Moist mucous membranes. JVD +   Cardiac: Regular rate and rhythm. Systolic murmur appreciated in 5th ICS MCL, no rubs, or gallops.   Vascular: Symmetric radial pulses. Dorsalis pedis pulses palpable.   Respiratory: Normal effort. Bibasilar crackles.   Abdomen: Soft, nontender. Audible bowel sounds.   Extremities: Warm with - edema. No cyanosis or clubbing.   Skin: Warm and dry. No rash.   Neurologic: Grossly normal motor function.   Psychiatric: Oriented to person, place, and time.     Data reviewed:  - Telemetry:    - ECG (date 11/10/22 ):  Ventricular Rate 114 BPM    Atrial Rate 114 BPM    P-R Interval 148 ms    QRS Duration 68 ms    Q-T Interval 334 ms    QTC Calculation(Bazett) 460 ms    P Axis 74 degrees    R Axis 62 degrees    T Axis 63 degrees    Diagnosis Line Sinus tachycardia  Possible Left atrial enlargement  Borderline ECG    Confirmed by elina oliver ([id number]) on 11/10/2022 8:34:45 AM     - TTE (date 11/11/22):  Summary:   1. Left ventricular ejection fraction, by visual estimation, is 60 to   65%.   2. Severe primary mitral valve regurgitation due to flail of the A2   leaflet, likely due to ruptured chordae. There appears to be flail of   P2/P3 as well.   3. There is an outlet VSD visualized with left to right shunting.   4. Mild to moderately enlarged left atrium.   5. No left atrial appendage thrombus.     - Chest x-ray (date 11/13/22):  There are bilateral opacities. No evidence of pneumothorax.     - CT Angio Chest PE Protocol w/ IV cont: (11/10/22)  IMPRESSION:    No CTA evidence of acute pulmonary embolus.    Small right pleural effusion with predominantly bibasilar interlobular   septal thickening and diffuse hazy groundglass appearance of the lungs,   slightly more focal in the left lower lobe. Correlate for pulmonary edema   and/or CHF.    - NO prior Stress test, CCTA, Cardiac catheterization, Cardiac MRI:    - Labs:                        9.5    14.58 )-----------( [id number]      ( 14 Nov 2022 05:24 )             29.2     11-14    132<L>  |  96<L>  |  9<L>  ----------------------------<  105<H>  3.9   |  27  |  0.6<L>    Ca    9.0      14 Nov 2022 05:24  Phos  3.8     11-14  Mg     2.2     11-14    TPro  7.1  /  Alb  3.6  /  TBili  0.6  /  DBili  x   /  AST  111<H>  /  ALT  64<H>  /  AlkPhos  162<H>  11-13      Troponin T, Serum: <0.01 ng/mL (11-10-22 @ 20:15)  Troponin T, Serum: <0.01 ng/mL (11-10-22 @ 16:54)  Troponin T, Serum: <0.01 ng/mL (11-10-22 @ 12:26)  Troponin T, Serum: <0.01 ng/mL (11-10-22 @ 04:03)    Serum Pro-Brain Natriuretic Peptide: 2247 pg/mL (11-10)    Medications:  benzocaine 20% Spray 1 Spray(s) Topical three times a day  furosemide   Injectable 40 milliGRAM(s) IV Push two times a day  influenza   Vaccine 0.5 milliLiter(s) IntraMuscular once  pantoprazole    Tablet 40 milliGRAM(s) Oral before breakfast  penicillin   G  potassium  IVPB      penicillin   G  potassium  IVPB 4 Million Unit(s) IV Intermittent every 6 hours  vancomycin  IVPB 1250 milliGRAM(s) IV Intermittent once    Drips:    PRN:     Allergies    Allergy Status Unknown    Intolerances Chief complaint: Patient is a 32y old  Female who presents with a chief complaint of hematemesis + hemoptysis + sob + chronic cough (13 Nov 2022 14:32)    Interval history:  11/12 bcx resulted closely resembling Gemella Species   Vanc trough (pre 4th dose): 6.7, increased Vancomycin to 1250 from 1000 units  Increased Lasix 40 mg from daily to BID, IN: 791 mL / OUT: 0 mL / NET: 791 mL  SBP remained <100 overnight  One episode of vomiting overnight, Zofran given, QTC normal   Persistent cough remains, afebrile   Review of systems: A complete 10-point review of systems was obtained and is negative except as stated in the interval history.    Vitals:  T(F): 98.2, Max: 98.8 (11-13 @ 16:18)  HR: 95 (91 - 103)  BP: 101/60 (92/53 - 103/70)  RR: 18 (18 - 18)  SpO2: 95% (95% - 98%)    Ins & outs:     11-11 @ 07:01  -  11-12 @ 07:00  --------------------------------------------------------  IN: 60 mL / OUT: 400 mL / NET: -340 mL    11-12 @ 07:01 - 11-13 @ 07:00  --------------------------------------------------------  IN: 970 mL / OUT: 800 mL / NET: 170 mL    11-13 @ 07:01  -  11-14 @ 07:00  --------------------------------------------------------  IN: 791 mL / OUT: 0 mL / NET: 791 mL      Weight trend:  Weight (kg): 68.3 (11-11)    Physical exam:  General: No apparent distress  HEENT: Anicteric sclera. Moist mucous membranes. JVD +   Cardiac: Regular rate and rhythm. Systolic murmur appreciated in 5th ICS MCL, no rubs, or gallops.   Vascular: Symmetric radial pulses. Dorsalis pedis pulses palpable.   Respiratory: Normal effort. Bibasilar crackles.   Abdomen: Soft, tender in all 4 quadrants. Audible bowel sounds.   Extremities: Warm with - edema. No cyanosis or clubbing.   Skin: Warm and dry. No rash.   Neurologic: Grossly normal motor function.   Psychiatric: Oriented to person, place, and time.     Data reviewed:  - Telemetry:    - ECG (date 11/10/22 ):  Ventricular Rate 114 BPM    Atrial Rate 114 BPM    P-R Interval 148 ms    QRS Duration 68 ms    Q-T Interval 334 ms    QTC Calculation(Bazett) 460 ms    P Axis 74 degrees    R Axis 62 degrees    T Axis 63 degrees    Diagnosis Line Sinus tachycardia  Possible Left atrial enlargement  Borderline ECG    Confirmed by elina oliver ([id number]) on 11/10/2022 8:34:45 AM     - TTE (date 11/11/22):  Summary:   1. Left ventricular ejection fraction, by visual estimation, is 60 to   65%.   2. Severe primary mitral valve regurgitation due to flail of the A2   leaflet, likely due to ruptured chordae. There appears to be flail of   P2/P3 as well.   3. There is an outlet VSD visualized with left to right shunting.   4. Mild to moderately enlarged left atrium.   5. No left atrial appendage thrombus.     - Chest x-ray (date 11/13/22):  There are bilateral opacities. No evidence of pneumothorax.     - CT Angio Chest PE Protocol w/ IV cont: (11/10/22)  IMPRESSION:    No CTA evidence of acute pulmonary embolus.    Small right pleural effusion with predominantly bibasilar interlobular   septal thickening and diffuse hazy groundglass appearance of the lungs,   slightly more focal in the left lower lobe. Correlate for pulmonary edema   and/or CHF.    - NO prior Stress test, CCTA, Cardiac catheterization, Cardiac MRI:    - Labs:                        9.5    14.58 )-----------( 378      ( 14 Nov 2022 05:24 )             29.2     11-14    132<L>  |  96<L>  |  9<L>  ----------------------------<  105<H>  3.9   |  27  |  0.6<L>    Ca    9.0      14 Nov 2022 05:24  Phos  3.8     11-14  Mg     2.2     11-14    TPro  7.1  /  Alb  3.6  /  TBili  0.6  /  DBili  x   /  AST  111<H>  /  ALT  64<H>  /  AlkPhos  162<H>  11-13      Troponin T, Serum: <0.01 ng/mL (11-10-22 @ 20:15)  Troponin T, Serum: <0.01 ng/mL (11-10-22 @ 16:54)  Troponin T, Serum: <0.01 ng/mL (11-10-22 @ 12:26)  Troponin T, Serum: <0.01 ng/mL (11-10-22 @ 04:03)    Serum Pro-Brain Natriuretic Peptide: 2247 pg/mL (11-10)    Medications:  benzocaine 20% Spray 1 Spray(s) Topical three times a day  furosemide   Injectable 40 milliGRAM(s) IV Push two times a day  influenza   Vaccine 0.5 milliLiter(s) IntraMuscular once  pantoprazole    Tablet 40 milliGRAM(s) Oral before breakfast  penicillin   G  potassium  IVPB      penicillin   G  potassium  IVPB 4 Million Unit(s) IV Intermittent every 6 hours  vancomycin  IVPB 1250 milliGRAM(s) IV Intermittent once    Drips:    PRN:     Allergies    Allergy Status Unknown    Intolerances

## 2022-11-14 NOTE — PROGRESS NOTE ADULT - NS ATTEND AMEND GEN_ALL_CORE FT
I agree with the A/P as above with the following additions.    In brief, Ms. Verma is a 32-year-old woman with history of known congenital unrepaired VSD presenting with hemoptysis in the context of a one week history of cough, found to have severe mitral regurgitation with TAVO revealing flail of the A2 leaflet likely due to ruptured chordae along with probable flail of P2/P3 and outlet VSD. Subsequent review does show possible vegetation, but difficult to differentiate from torn chordae. Blood cultures are growing +GPCs.    Labs notable for:  - WBC 17, Hgb 10.4, Plt 349  - Na 132, K 3.6, Cr 0.6, TBili 1.5, AlkP 146  - BCx 11/10, 11/11 +GPC    TTE 11/10/22   1. Normal global left ventricular systolic function.   2. LV Ejection Fraction by Pop's Method with a biplane EF of 59 %.   3. Mildly enlarged right ventricle.   4. Mildly reduced RV systolic function.   5. Severe mitral valve regurgitation, likely acute due to flail anterior leaflet.   6. Severe tricuspid regurgitation.   7. There is a supracristal VSD.    CTSx is following.  ID consult is following.    Plan:  - IV antibiotics; OK to use vancomycin while awaiting speciation/sensitivities.  - Daily cultures until clear.  - Will likely require surgical repair during this hospitalization. Will discuss surgical planning with the CTSx team. If hemodynamically stable may need to await clear cultures.  - She is not grossly overloaded on exam but notes significant improvement of cough with lasix (states she knows the lasix is wearing off about 6-8 hours after her morning dose because her coughing becomes worse). Increase lasix 40mg IV to twice daily.  - Keep SBP <100 as tolerated.
I agree with the A/P as above with the following additions.    In brief, Ms. Verma is a 32-year-old woman with history of known congenital unrepaired VSD presenting with hemoptysis in the context of a one week history of cough, found to have severe mitral regurgitation with TAVO revealing flail of the A2 leaflet likely due to ruptured chordae along with probable flail of P2/P3 and outlet VSD. No visual evidence of endocarditis, though she has been febrile with cultures now showing +GPC.    Labs notable for:  - WBC 17, Hgb 10.4, Plt 349  - Na 132, K 3.6, Cr 0.6, TBili 1.5, AlkP 146  - BCx 11/10 Anaerobic +GPC x2; second sample 4 hours later negative to date.    TTE 11/10/22   1. Normal global left ventricular systolic function.   2. LV Ejection Fraction by Pop's Method with a biplane EF of 59 %.   3. Mildly enlarged right ventricle.   4. Mildly reduced RV systolic function.   5. Severe mitral valve regurgitation, likely acute due to flail anterior leaflet.   6. Severe tricuspid regurgitation.   7. There is a supracristal VSD.    CTSx is following.  ID consult is following.    Plan:  - IV antibiotics; OK to use vancomycin while awaiting speciation/sensitivities.  - Daily cultures until clear.  - Will likely require surgical repair during this hospitalization. Will discuss surgical planning with the CTSx team. If hemodynamically stable may need to await clear cultures.  - She is not grossly overloaded on exam. Continue lasix 40mg IV daily.  - Keep SBP <100 as tolerated.
Briefly, 32-year-old woman with history of known congenital unrepaired VSD presenting with hemoptysis in the context of a one week history of cough, found to have severe mitral regurgitation with TAVO revealing flail of the A2 leaflet likely due to ruptured chordae along with probable flail of P2/P3 and outlet VSD. Subsequent review does show possible vegetation, but difficult to differentiate from torn chordae. Blood cultures are growing +GPCs.    Labs and imaging personally reviewed. There may be a vegetation on the mitral valve.     Plan:  1. Continue IV antibiotics as per ID.  2. Increase lasix to 60mg IV BID to keep patient euvolemic; f/u evening BMP as patient is hyponatremic. Start metoprolol tartrate 12.5mg BID.   3. Patient has rising LFTs despite being diuresed so less likely from congestion. She also is vomiting and has abdominal pain. Will do an abdominal ultrasound and possibly consult GI.

## 2022-11-14 NOTE — H&P ADULT - NSHPREVIEWOFSYSTEMS_GEN_ALL_CORE
REVIEW OF SYSTEMS:  CONSTITUTIONAL: Denies fever, weight loss or fatigue  NECK: Denies pain or stiffness  RESPIRATORY: + shortness of breath, + cough, + recent blood in sputum. Denies wheezing, chills,   CARDIOVASCULAR: Denies chest pain, palpitations, dizziness, or leg swelling  GASTROINTESTINAL: + nausea, +vomiting, + recent hematemesis, Denies abdominal or epigastric pain, diarrhea or melena  GENITOURINARY: Denies dysuria, frequency, hematuria, or incontinence  NEUROLOGICAL: Denies headaches, memory loss, loss of strength, numbness or tremors  SKIN: Denies itching, burning, rashes, or lesions   LYMPH NODES: Denies enlarged glands  ENDOCRINE: Denies heat or cold intolerance or hair loss  MUSCULOSKELETAL: Denies joint pain or swelling, muscle, back or extremity pain  PSYCHIATRIC: Denies depression, anxiety, mood swings or difficulty sleeping  HEME/LYMPH: Denies easy bruising or bleeding gums  ALLERGY: Denies hives or eczema

## 2022-11-14 NOTE — CHART NOTE - NSCHARTNOTEFT_GEN_A_CORE
31 yo F w PMH of congenital VSD (uncorrected) presented to the ED w chronic cough, SOB, hematemesis & hemoptysis.  Pt co chronic cough for the past 2 months, initially non-productive, now slightly productive associated with SOB. Initially she was able to climb one flight of stairs, but the SOB & cough have worsened over the past 3 weeks, now claims that she would be short of breath if she walked 5ft  She was seen by her PCP & pulmonologist for the cough & sob and was advised to use inhalers and azithromycin with no relief.  Yesterday night her cough worsened and also had an episode of vomiting, found 10-15 ml of blood mixed with the vomitus. Her cough episode later showed sputum w blood streaks. Claims that this was new, and never happened in the past 2 months.  No smoking or IVDU, FMHx NG for bleeding disorders, denies PND, orthopnea.    In the ED, , SpO2 97 on RA, received 40mg lasix IV in the ED  labs: WBC 13.96, Hb 9.6 (baseline unknown), BNP 2247, trop <0.01, UA wnl  EKG: Line Sinus tachycardia + Possible Left atrial enlargement  CTA PE: Small right pleural effusion + bibasilar interlobular septal thickening + diffuse hazy ground-glass appearance (slightly more focal in the LLL)    TAVO was remarkable for severe mitral regurgitation with a flailing anterior leaflet with vegetation found, along with a congenital VSD with L->R shunting with an EF of 60-65%.  During the stay, it was remarkable for an ongoing chronic cough. On exam, patient had bibasilar crackles which resulted in increasing the patients diuretic regimen from Lasix 40 mg IVP QD, to Lasix 40 mg IVP BID, and on 11/14 upgraded to Lasix 60 mg BID IVP. Patient was also started on metoprolol tartrate 12.5 mg BID as per GDMT.   Blood cultures from 11/12 and 11/10 show gram positive cocci in pairs that most likely resemble gemella species. Patient receieved three doses of Vanc 1000 and one dose 1250, PCN 4 million Units q6 started on 11/13.   Plan is for patient to be medically optimized and cleared blood cultures for cardiac surgical intervention.      Plan   Imaging/EKG below:   Data reviewed:  - Telemetry: SR   - ECG (date 11/10/22 ):  Ventricular Rate 114 BPM    Atrial Rate 114 BPM    P-R Interval 148 ms    QRS Duration 68 ms    Q-T Interval 334 ms    QTC Calculation(Bazett) 460 ms    P Axis 74 degrees    R Axis 62 degrees    T Axis 63 degrees    Diagnosis Line Sinus tachycardia  Possible Left atrial enlargement  Borderline ECG    Confirmed by elina oliver ([id number]) on 11/10/2022 8:34:45 AM     - TTE (date 11/11/22):  Summary:   1. Left ventricular ejection fraction, by visual estimation, is 60 to   65%.   2. Severe primary mitral valve regurgitation due to flail of the A2   leaflet, likely due to ruptured chordae. There appears to be flail of   P2/P3 as well.   3. There is an outlet VSD visualized with left to right shunting.   4. Mild to moderately enlarged left atrium.   5. No left atrial appendage thrombus.     - Chest x-ray (date 11/13/22):  There are bilateral opacities. No evidence of pneumothorax.     - CT Angio Chest PE Protocol w/ IV cont: (11/10/22)  IMPRESSION:    No CTA evidence of acute pulmonary embolus.    Small right pleural effusion with predominantly bibasilar interlobular   septal thickening and diffuse hazy groundglass appearance of the lungs,   slightly more focal in the left lower lobe. Correlate for pulmonary edema   and/or CHF.    - NO prior Stress test, CCTA, Cardiac catheterization, Cardiac MRI:      Progress Note from 11/14 below:   31 yo F w PMH of congenital VSD (uncorrected) presented to the ED w chronic cough, SOB, hematemesis & hemoptysis 11/10, found to have severe mitral regurgitation and VSD with left to right shunting on TAVO. Now admitted to T for optimization prior to mitral valve and VSD repair.     Problems discussed and associated plan:  #Gram +cocci on BC  - repeat blood culture until clearance   -BCx from 11/12 most closely resembling Gemella species, Bcx 11/13 no growth to date, please f/u   - Start vanco 1gm q12h once BC drawn\  - TTE reread by Dr. Jordan: vegetation found  - ID reccs appreciated: GPC pairs anaerobic started on PCN 4 million units q6 IV, Vanc 1g BID IV (please check trough 30 min before 4th dose (range 15-20 if >20 HOLD vanc and order AM random vanco level)   - Started on Penicillin 4 mil U for endocarditis 11/13  - pre 4th dose vanc trough 6.7, increased vanc to 1250 U. As per ID, d/c Vanc, adding gentamiacin 70 mg q8hrs IV, and find species of Gemella and sensitivities.     #Severe MR with flail anterior leaflet   - VSD/MVR once medically optimized and bcx clear  - keep SBP <100  - CTS reccs appreciated: awaiting clear bcx, considering transferring to a hospital that specializes in adult congenital heart defects, will reassess once medically optimized  - Started on Lopressor 12.5 mg BID 11/14    #New onset CHF, BNP 2247   -Started on Lasix 60 mg BID IVP 11/14, f/u pm labs   -daily standing weights  - strict I&O  - replete K and Mg as needed     #Transamnitis (new)  - elevated LFT on 11/13 labs with abdominal tenderness in all 4 quadrants   - Abdominal US pending   - consider GI consult upon results     #Hemoptysis  - Hgb and HCt down trending since admission  - keep active type and screen     #Chronic cough/persistent nausea  - guaifenesin DM prn  - zofran 4mg IVP prn  - Entero//Rhinovirus + (Isolation precautions not needed at this time)     FULL CODE  DVT PPX: compression leg devices    Please contact me with any questions or concerns at [phone].

## 2022-11-14 NOTE — PROGRESS NOTE ADULT - PROVIDER SPECIALTY LIST ADULT
Cardiology
CT Surgery
Infectious Disease
Infectious Disease
Hospitalist
Infectious Disease

## 2022-11-14 NOTE — H&P ADULT - NSICDXPASTMEDICALHX_GEN_ALL_CORE_FT
PAST MEDICAL HISTORY:  Murmur      PAST MEDICAL HISTORY:  Murmur     VSD (ventricular septal defect)

## 2022-11-14 NOTE — PROGRESS NOTE ADULT - ASSESSMENT
· Assessment	  31 yo F w PMH of congenital VSD (uncorrected) presented to the ED w chronic cough, SOB, hematemesis & hemoptysis.  Pt co chronic cough for the past 2 months, initially non-productive, now slightly productive associated with SOB. Initially she was able to climb one flight of stairs, but the SOB & cough have worsened over the past 3 weeks, now claims that she would be short of breath if she walked 5ft  She was seen by her PCP & pulmonologist for the cough & sob and was advised to use inhalers and azithromycin with no relief.  Yesterday night her cough worsened and also had an episode of vomiting, found 10-15 ml of blood mixed with the vomitus. Her cough episode later showed sputum w blood streaks. Claims that this was new, and never happened in the past 2 months.  Never smoked in life, FMHx NG for bleeding disorders, denies PND, orthopnea.  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 Endocarditis in DDX- TAVO Severe MR from flial anterior leaflet likely due to chord rupture.  #Severe TR  #No bacterial PNA    RECOMMENDATIONS;  Daily BCx till repeatedly negative   Call Micro ( [phone] ) and request species of Gemella and sensitivities   PCN 4 million units every 6h IV   Add Gentamicin 70 mg iv q8h  d/c Vanc 1g q12h IV

## 2022-11-15 ENCOUNTER — APPOINTMENT (OUTPATIENT)
Dept: OBGYN | Facility: CLINIC | Age: 32
End: 2022-11-15

## 2022-11-15 DIAGNOSIS — Q21.0 VENTRICULAR SEPTAL DEFECT: ICD-10-CM

## 2022-11-15 DIAGNOSIS — R78.81 BACTEREMIA: ICD-10-CM

## 2022-11-15 DIAGNOSIS — I34.0 NONRHEUMATIC MITRAL (VALVE) INSUFFICIENCY: ICD-10-CM

## 2022-11-15 PROBLEM — R01.1 CARDIAC MURMUR, UNSPECIFIED: Chronic | Status: ACTIVE | Noted: 2022-11-10

## 2022-11-15 LAB
A1C WITH ESTIMATED AVERAGE GLUCOSE RESULT: 5.5 % — SIGNIFICANT CHANGE UP (ref 4–5.6)
ALBUMIN SERPL ELPH-MCNC: 3.8 G/DL — SIGNIFICANT CHANGE UP (ref 3.3–5)
ALP SERPL-CCNC: 168 U/L — HIGH (ref 40–120)
ALT FLD-CCNC: 93 U/L — HIGH (ref 10–45)
ANION GAP SERPL CALC-SCNC: 14 MMOL/L — SIGNIFICANT CHANGE UP (ref 5–17)
APPEARANCE UR: ABNORMAL
APTT BLD: 30.8 SEC — SIGNIFICANT CHANGE UP (ref 27.5–35.5)
AST SERPL-CCNC: 97 U/L — HIGH (ref 10–40)
BACTERIA # UR AUTO: NEGATIVE — SIGNIFICANT CHANGE UP
BASOPHILS # BLD AUTO: 0.05 K/UL — SIGNIFICANT CHANGE UP (ref 0–0.2)
BASOPHILS NFR BLD AUTO: 0.3 % — SIGNIFICANT CHANGE UP (ref 0–2)
BILIRUB SERPL-MCNC: 0.7 MG/DL — SIGNIFICANT CHANGE UP (ref 0.2–1.2)
BILIRUB UR-MCNC: NEGATIVE — SIGNIFICANT CHANGE UP
BUN SERPL-MCNC: 11 MG/DL — SIGNIFICANT CHANGE UP (ref 7–23)
CALCIUM SERPL-MCNC: 9.7 MG/DL — SIGNIFICANT CHANGE UP (ref 8.4–10.5)
CHLORIDE SERPL-SCNC: 94 MMOL/L — LOW (ref 96–108)
CO2 SERPL-SCNC: 26 MMOL/L — SIGNIFICANT CHANGE UP (ref 22–31)
COLOR SPEC: YELLOW — SIGNIFICANT CHANGE UP
CREAT SERPL-MCNC: 1 MG/DL — SIGNIFICANT CHANGE UP (ref 0.5–1.3)
CULTURE RESULTS: SIGNIFICANT CHANGE UP
CULTURE RESULTS: SIGNIFICANT CHANGE UP
DIFF PNL FLD: NEGATIVE — SIGNIFICANT CHANGE UP
EGFR: 76 ML/MIN/1.73M2 — SIGNIFICANT CHANGE UP
EOSINOPHIL # BLD AUTO: 0.48 K/UL — SIGNIFICANT CHANGE UP (ref 0–0.5)
EOSINOPHIL NFR BLD AUTO: 2.8 % — SIGNIFICANT CHANGE UP (ref 0–6)
EPI CELLS # UR: 7 /HPF — HIGH
ESTIMATED AVERAGE GLUCOSE: 111 MG/DL — SIGNIFICANT CHANGE UP (ref 68–114)
GLUCOSE SERPL-MCNC: 168 MG/DL — HIGH (ref 70–99)
GLUCOSE UR QL: NEGATIVE — SIGNIFICANT CHANGE UP
GRAM STN FLD: SIGNIFICANT CHANGE UP
GRAM STN FLD: SIGNIFICANT CHANGE UP
HCG SERPL-ACNC: <2 MIU/ML — SIGNIFICANT CHANGE UP
HCT VFR BLD CALC: 30.6 % — LOW (ref 34.5–45)
HGB BLD-MCNC: 9.7 G/DL — LOW (ref 11.5–15.5)
HYALINE CASTS # UR AUTO: 4 /LPF — HIGH (ref 0–2)
IMM GRANULOCYTES NFR BLD AUTO: 0.6 % — SIGNIFICANT CHANGE UP (ref 0–0.9)
INR BLD: 1.21 RATIO — HIGH (ref 0.88–1.16)
KETONES UR-MCNC: NEGATIVE — SIGNIFICANT CHANGE UP
LEUKOCYTE ESTERASE UR-ACNC: NEGATIVE — SIGNIFICANT CHANGE UP
LYMPHOCYTES # BLD AUTO: 18.8 % — SIGNIFICANT CHANGE UP (ref 13–44)
LYMPHOCYTES # BLD AUTO: 3.2 K/UL — SIGNIFICANT CHANGE UP (ref 1–3.3)
MCHC RBC-ENTMCNC: 25.6 PG — LOW (ref 27–34)
MCHC RBC-ENTMCNC: 31.7 GM/DL — LOW (ref 32–36)
MCV RBC AUTO: 80.7 FL — SIGNIFICANT CHANGE UP (ref 80–100)
MONOCYTES # BLD AUTO: 1 K/UL — HIGH (ref 0–0.9)
MONOCYTES NFR BLD AUTO: 5.9 % — SIGNIFICANT CHANGE UP (ref 2–14)
MRSA PCR RESULT.: SIGNIFICANT CHANGE UP
NEUTROPHILS # BLD AUTO: 12.2 K/UL — HIGH (ref 1.8–7.4)
NEUTROPHILS NFR BLD AUTO: 71.6 % — SIGNIFICANT CHANGE UP (ref 43–77)
NITRITE UR-MCNC: NEGATIVE — SIGNIFICANT CHANGE UP
NRBC # BLD: 0 /100 WBCS — SIGNIFICANT CHANGE UP (ref 0–0)
NT-PROBNP SERPL-SCNC: 567 PG/ML — HIGH (ref 0–300)
PH UR: 5 — SIGNIFICANT CHANGE UP (ref 5–8)
PLATELET # BLD AUTO: 434 K/UL — HIGH (ref 150–400)
POTASSIUM SERPL-MCNC: 3.6 MMOL/L — SIGNIFICANT CHANGE UP (ref 3.5–5.3)
POTASSIUM SERPL-SCNC: 3.6 MMOL/L — SIGNIFICANT CHANGE UP (ref 3.5–5.3)
PREALB SERPL-MCNC: 12 MG/DL — LOW (ref 20–40)
PROT SERPL-MCNC: 8.3 G/DL — SIGNIFICANT CHANGE UP (ref 6–8.3)
PROT UR-MCNC: ABNORMAL
PROTHROM AB SERPL-ACNC: 13.9 SEC — HIGH (ref 10.5–13.4)
RBC # BLD: 3.79 M/UL — LOW (ref 3.8–5.2)
RBC # FLD: 14.1 % — SIGNIFICANT CHANGE UP (ref 10.3–14.5)
RBC CASTS # UR COMP ASSIST: 1 /HPF — SIGNIFICANT CHANGE UP (ref 0–4)
S AUREUS DNA NOSE QL NAA+PROBE: SIGNIFICANT CHANGE UP
SARS-COV-2 RNA SPEC QL NAA+PROBE: SIGNIFICANT CHANGE UP
SODIUM SERPL-SCNC: 134 MMOL/L — LOW (ref 135–145)
SP GR SPEC: 1.01 — SIGNIFICANT CHANGE UP (ref 1.01–1.02)
SPECIMEN SOURCE: SIGNIFICANT CHANGE UP
T4 FREE SERPL-MCNC: 1.2 NG/DL — SIGNIFICANT CHANGE UP (ref 0.9–1.8)
TSH SERPL-MCNC: 0.64 UIU/ML — SIGNIFICANT CHANGE UP (ref 0.27–4.2)
UROBILINOGEN FLD QL: NEGATIVE — SIGNIFICANT CHANGE UP
WBC # BLD: 17.04 K/UL — HIGH (ref 3.8–10.5)
WBC # FLD AUTO: 17.04 K/UL — HIGH (ref 3.8–10.5)
WBC UR QL: 1 /HPF — SIGNIFICANT CHANGE UP (ref 0–5)

## 2022-11-15 PROCEDURE — 93880 EXTRACRANIAL BILAT STUDY: CPT | Mod: 26

## 2022-11-15 PROCEDURE — 99232 SBSQ HOSP IP/OBS MODERATE 35: CPT

## 2022-11-15 PROCEDURE — 93306 TTE W/DOPPLER COMPLETE: CPT | Mod: 26

## 2022-11-15 PROCEDURE — 99222 1ST HOSP IP/OBS MODERATE 55: CPT

## 2022-11-15 PROCEDURE — 93010 ELECTROCARDIOGRAM REPORT: CPT

## 2022-11-15 PROCEDURE — 99221 1ST HOSP IP/OBS SF/LOW 40: CPT

## 2022-11-15 RX ORDER — POTASSIUM CHLORIDE 20 MEQ
20 PACKET (EA) ORAL ONCE
Refills: 0 | Status: COMPLETED | OUTPATIENT
Start: 2022-11-15 | End: 2022-11-15

## 2022-11-15 RX ORDER — BNT162B2 ORIGINAL AND OMICRON BA.4/BA.5 .1125; .1125 MG/2.25ML; MG/2.25ML
0.3 INJECTION, SUSPENSION INTRAMUSCULAR ONCE
Refills: 0 | Status: DISCONTINUED | OUTPATIENT
Start: 2022-11-15 | End: 2022-11-22

## 2022-11-15 RX ORDER — CEFTRIAXONE 500 MG/1
2000 INJECTION, POWDER, FOR SOLUTION INTRAMUSCULAR; INTRAVENOUS EVERY 24 HOURS
Refills: 0 | Status: DISCONTINUED | OUTPATIENT
Start: 2022-11-16 | End: 2022-11-18

## 2022-11-15 RX ORDER — ACETAMINOPHEN 500 MG
650 TABLET ORAL EVERY 6 HOURS
Refills: 0 | Status: DISCONTINUED | OUTPATIENT
Start: 2022-11-15 | End: 2022-11-22

## 2022-11-15 RX ORDER — CEFTRIAXONE 500 MG/1
2000 INJECTION, POWDER, FOR SOLUTION INTRAMUSCULAR; INTRAVENOUS ONCE
Refills: 0 | Status: COMPLETED | OUTPATIENT
Start: 2022-11-15 | End: 2022-11-15

## 2022-11-15 RX ORDER — CEFTRIAXONE 500 MG/1
INJECTION, POWDER, FOR SOLUTION INTRAMUSCULAR; INTRAVENOUS
Refills: 0 | Status: DISCONTINUED | OUTPATIENT
Start: 2022-11-15 | End: 2022-11-18

## 2022-11-15 RX ORDER — ENOXAPARIN SODIUM 100 MG/ML
40 INJECTION SUBCUTANEOUS EVERY 24 HOURS
Refills: 0 | Status: DISCONTINUED | OUTPATIENT
Start: 2022-11-15 | End: 2022-11-21

## 2022-11-15 RX ORDER — PANTOPRAZOLE SODIUM 20 MG/1
40 TABLET, DELAYED RELEASE ORAL
Refills: 0 | Status: DISCONTINUED | OUTPATIENT
Start: 2022-11-15 | End: 2022-11-22

## 2022-11-15 RX ORDER — FUROSEMIDE 40 MG
60 TABLET ORAL ONCE
Refills: 0 | Status: COMPLETED | OUTPATIENT
Start: 2022-11-15 | End: 2022-11-15

## 2022-11-15 RX ADMIN — ENOXAPARIN SODIUM 40 MILLIGRAM(S): 100 INJECTION SUBCUTANEOUS at 07:20

## 2022-11-15 RX ADMIN — SODIUM CHLORIDE 3 MILLILITER(S): 9 INJECTION INTRAMUSCULAR; INTRAVENOUS; SUBCUTANEOUS at 07:03

## 2022-11-15 RX ADMIN — SODIUM CHLORIDE 3 MILLILITER(S): 9 INJECTION INTRAMUSCULAR; INTRAVENOUS; SUBCUTANEOUS at 22:57

## 2022-11-15 RX ADMIN — ONDANSETRON 4 MILLIGRAM(S): 8 TABLET, FILM COATED ORAL at 00:42

## 2022-11-15 RX ADMIN — Medication 103.5 MILLIGRAM(S): at 18:05

## 2022-11-15 RX ADMIN — SODIUM CHLORIDE 3 MILLILITER(S): 9 INJECTION INTRAMUSCULAR; INTRAVENOUS; SUBCUTANEOUS at 13:58

## 2022-11-15 RX ADMIN — Medication 12.5 MILLIGRAM(S): at 10:04

## 2022-11-15 RX ADMIN — PENICILLIN G POTASSIUM 100 MILLION UNIT(S): 5000000 POWDER, FOR SOLUTION INTRAMUSCULAR; INTRAPLEURAL; INTRATHECAL; INTRAVENOUS at 02:03

## 2022-11-15 RX ADMIN — Medication 103.5 MILLIGRAM(S): at 00:56

## 2022-11-15 RX ADMIN — Medication 103.5 MILLIGRAM(S): at 09:14

## 2022-11-15 RX ADMIN — Medication 20 MILLIEQUIVALENT(S): at 10:04

## 2022-11-15 RX ADMIN — CEFTRIAXONE 100 MILLIGRAM(S): 500 INJECTION, POWDER, FOR SOLUTION INTRAMUSCULAR; INTRAVENOUS at 10:02

## 2022-11-15 RX ADMIN — Medication 12.5 MILLIGRAM(S): at 21:39

## 2022-11-15 RX ADMIN — Medication 60 MILLIGRAM(S): at 00:42

## 2022-11-15 NOTE — CONSULT NOTE ADULT - PROBLEM SELECTOR RECOMMENDATION 3
Patient's BCx positive from OSH (11/11) Gm + that most resemble Gemelli species.  - currently on ABx.  - treat patient per primary team.  - appreciate ID rec.

## 2022-11-15 NOTE — CONSULT NOTE ADULT - ASSESSMENT
32 year old with history of unrepaired VSD presents with sob, cough , hemoptysis , fever, fatigue to [facility]. 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 She has no dental problems and no recent dental work except for replacement of a cap.  Denies HA, change i mental status  Feels better since ab started but having very bad burning fro m the pcn infusion.   No hs of hearing issues or vestibular problems      She has severe MR on a TAVO  no abscess noted  Follow up BC are negative    ceftriaxone and genta for endocarditis  MRA ordered by  [facility]  Will need repair or replacement of MV    
Ms. Verma is a 31 y/o woman w/ congenital VSD (unrepaired), who is transferred from OSH with severe MR 2/2 IE w/ Gm + bacteremia for MV repair. Heart failure team consulted by CT surgery for volume assessment and diuretics management.

## 2022-11-15 NOTE — CONSULT NOTE ADULT - PROBLEM SELECTOR RECOMMENDATION 2
congenital VSD that is not fixed.  - TTE shows L to R flow on doppler. No sign/symptoms of Eisenmenger.

## 2022-11-15 NOTE — CONSULT NOTE ADULT - SUBJECTIVE AND OBJECTIVE BOX
Patient is a 32y old  Female who presents with a chief complaint of MV Endocarditis/ VSD (14 Nov 2022 22:45)    HPI:  31 yo F w PMH of congenital VSD (uncorrected) presents to [facility] CTS for 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 Initially patient presented to the [facility] ED w chronic cough, SOB, hematemesis & hemoptysis. Pt complained of  chronic cough for the past 2 months, initially non-productive, now slightly productive associated with SOB. Initially she was able to climb one flight of stairs, but the SOB & cough have worsened over the past 3 weeks, now claims that she would be short of breath if she walked 5ft She was seen by her PCP & pulmonologist for the cough & sob and was advised to use inhalers and azithromycin with no relief. Wednesda night 11/9 into Thursday AM 11/10 her cough worsened and also had an episode of vomiting, found 10-15 ml of blood mixed with the vomitus. Her cough episode later showed sputum w blood streaks. Claims that this was new, and never happened in the past 2 months. Patient was admited to [facility]. Patient was found to be in Acute HF and an echo was done showing Severe mitral valve regurgitation and . Patient was then transferred to [facility] to 11/14 under Dr. Denson for Evaluation of MV regurgitation/endocarditis and VSD. Patient currently admits to coughing that has only been helped by Lasix that was being administered at [facility].  most Recent travel in may to [facility] currently denies Chest pain, fevers, chills, diarrhea, headaches, urinary or bowel changes.   (14 Nov 2022 22:45)      PAST MEDICAL & SURGICAL HISTORY:  Murmur      VSD (ventricular septal defect)          Social history:    FAMILY HISTORY:    REVIEW OF SYSTEMS  General:	Denies any malaise fatigue or chills. Fevers absent    Skin:No rash  	  Ophthalmologic:Denies any visual complaints,discharge redness or photophobia  	  ENMT:No nasal discharge,headache,sinus congestion or throat pain.No dental complaints    Respiratory and Thorax:No cough,sputum or chest pain.Denies shortness of breath  	  Cardiovascular:	No chest pain,palpitaions or dizziness    Gastrointestinal:	NO nausea,abdominal pain or diarrhea.    Genitourinary:	No dysuria,frequency. No flank pain    Musculoskeletal:	No joint swelling or pain.No weakness    Neurological:No confusion,diziness.No extremity weakness.No bladder or bowel incontinence	    Psychiatric:No delusions or hallucinations	    Hematology/Lymphatics:	No LN swelling.No gum bleeding     Endocrine:	No recent weight gain or loss.No abnormal heat/cold intolerance    Allergic/Immunologic:	No hives or rash   Allergies    Allergy Status Unknown    Intolerances        Antimicrobials:    cefTRIAXone   IVPB      gentamicin   IVPB 70 milliGRAM(s) IV Intermittent every 8 hours        Vital Signs Last 24 Hrs  T(C): 37 (15 Nov 2022 04:57), Max: 37.1 (14 Nov 2022 21:49)  T(F): 98.6 (15 Nov 2022 04:57), Max: 98.8 (14 Nov 2022 21:49)  HR: 89 (15 Nov 2022 04:57) (89 - 104)  BP: 98/65 (15 Nov 2022 04:57) (96/65 - 105/63)  BP(mean): 79 (14 Nov 2022 17:26) (74 - 79)  RR: 18 (15 Nov 2022 04:57) (18 - 18)  SpO2: 97% (15 Nov 2022 04:57) (97% - 98%)    Parameters below as of 15 Nov 2022 04:57  Patient On (Oxygen Delivery Method): room air        PHYSICAL EXAM:Pleasant patient in no acute distress.         No cachexia     Eyes:PERRL EOMI.NO discharge or conjunctival injection    ENMT:No sinus tenderness.No thrush.No pharyngeal exudate or erythema.Fair dental hygiene    Neck:Supple,No LN,no JVD      Respiratory:Good air entry bilaterally,CTA    Cardiovascular:S1 S2 wnl, No murmurs,rub or gallops    Gastrointestinal:Soft BS(+) no tenderness no masses ,No rebound or guarding    Genitourinary:No CVA tendereness     Rectal:    Extremities:No cyanosis,clubbing or edema.    Vascular:peripheral pulses felt    Neurological:AAO X 3,No grossly focal deficits    Skin:No rash     Lymph Nodes:No palpable LNs    Musculoskeletal:No joint swelling or LOM                                 9.7    17.04 )-----------( 434      ( 15 Nov 2022 00:54 )             30.6         11-15    134<L>  |  94<L>  |  11  ----------------------------<  168<H>  3.6   |  26  |  1.00    Ca    9.7      15 Nov 2022 00:54  Phos  3.8     11-14  Mg     2.0     11-14    TPro  8.3  /  Alb  3.8  /  TBili  0.7  /  DBili  x   /  AST  97<H>  /  ALT  93<H>  /  AlkPhos  168<H>  11-15      RECENT CULTURES:  11-13 @ 18:59  .Blood Blood-Peripheral  --  --  --    No growth to date.  --  11-12 @ 11:00  .Blood Blood  --  --  --    No growth to date.  --  11-11 @ 08:11  .Blood None  --  --  --    Growth in anaerobic bottle: Most closely resembling Gemella species  "Susceptibilities not performed"  Growth in aerobic bottle: Gram positive cocci in pairs  --  11-10 @ 16:54  .Blood None  --  --  --    Growth in aerobic bottle: Most closely resembling Gemella species  "Susceptibilities not performed"  Growth in anaerobic bottle: Gram positive cocci in pairs  --  11-10 @ 12:26  .Blood None  Blood Culture PCR  Blood Culture PCR  PCR    Growth in aerobic and anaerobic bottles: Gram positive cocci in pairs  Most closely resembling Gemella species  "Susceptibilities not performed"  ***Blood Panel PCR results on this specimen are available  approximately 3 hours after the Gram stain result.***  Gram stain, PCR, and/or culture results may not always  correspond due to difference in methodologies.  ************************************************************  This PCR assay was performed by multiplex PCR. This  Assay tests for 66 bacterial and resistance gene targets.  Please refer to the [facility] test directory  at [email or web address] for details.  --  11-10 @ 10:44  Clean Catch Clean Catch (Midstream)  --  --  --    <10,000 CFU/mL Normal Urogenital Silke  --      MICROBIOLOGY:  Culture Results:   No growth to date. (11-13 @ 18:59)  Culture Results:   No growth to date. (11-12 @ 11:00)  Culture Results:   Growth in anaerobic bottle: Most closely resembling Gemella species  "Susceptibilities not performed"  Growth in aerobic bottle: Gram positive cocci in pairs (11-11 @ 08:11)  Culture Results:   Growth in aerobic bottle: Most closely resembling Gemella species  "Susceptibilities not performed"  Growth in anaerobic bottle: Gram positive cocci in pairs (11-10 @ 16:54)  Culture Results:   Growth in aerobic and anaerobic bottles: Gram positive cocci in pairs  Most closely resembling Gemella species  "Susceptibilities not performed"  ***Blood Panel PCR results on this specimen are available  approximately 3 hours after the Gram stain result.***  Gram stain, PCR, and/or culture results may not always  correspond due to difference in methodologies.  ************************************************************  This PCR assay was performed by multiplex PCR. This  Assay tests for 66 bacterial and resistance gene targets.  Please refer to the [facility] test directory  at [email or web address] for details. (11-10 @ 12:26)  Culture Results:   Growth in aerobic and anaerobic bottles: Gram positive cocci in pairs  Most closely resembling Gemella species  "Susceptibilities not performed" (11-10 @ 12:26)  Culture Results:   <10,000 CFU/mL Normal Urogenital Silke (11-10 @ 10:44)          Radiology:      Assessment:        Recommendations and Plan:    Pager [phone]  After 5 pm/weekends or if no response :[phone]

## 2022-11-15 NOTE — CONSULT NOTE ADULT - ATTENDING COMMENTS
32/F with h/o uncorrected VSD following with cards(not since covid-19) with symptoms of cough starting since labour day, since which she has gradually worsened  ( exertional symptoms, orthopnea ) with multiple ER visit and pulm clinic visit and multiple treatment with z pack without improvement. was admitted to [facility] and ECHO showed new onset severe MR and Blood cx positive for GPC, TAVO/TTE with concerns for MV vegetation and flail posterior leaflet with torrential MR.     TTE 11/15: normal LV and RV function, posterior flain MVL, severe MR< echodensity concerning for vegetation on MV.     exam: euvolemic, No JVP elevation, no leg swelling.     Plan:  PRN diuretics, as she is euvolemic now  cont antibiotics as per ID for infective endocarditis  CTS plan for MV repair   ok to continue metoprolol 12.5mg BID  will cont follow as needed.   call with question

## 2022-11-15 NOTE — PROGRESS NOTE ADULT - SUBJECTIVE AND OBJECTIVE BOX
VITAL SIGNS    Telemetry:    SR    Vital Signs Last 24 Hrs  T(C): 36.3 (11-15-22 @ 12:32), Max: 37.1 (22 @ 21:49)  T(F): 97.4 (11-15-22 @ 12:32), Max: 98.8 (22 @ 21:49)  HR: 88 (11-15-22 @ 12:32) (88 - 104)  BP: 94/66 (11-15-22 @ 12:32) (94/66 - 105/63)  RR: 18 (11-15-22 @ 12:32) (18 - 18)  SpO2: 97% (11-15-22 @ 12:32) (97% - 98%)                   Daily Height in cm: 160.02 (2022 21:49)    Daily Weight in k.7 (2022 21:49)      Bilirubin Total, Serum: 0.7 mg/dL (11-15 @ 00:54)    CAPILLARY BLOOD GL                    PHYSICAL EXAM  S   nO SOB  NO cp"  Neurology: alert and oriented x 3, moves all extremities with no defecits  CV :  RRR  Lungs:   CTA B/L  Abdomen: soft, nontender, nondistended, positive bowel sounds,  Extremities:       NO PEDAL EDEMA                                  Allergy;

## 2022-11-15 NOTE — CONSULT NOTE ADULT - SUBJECTIVE AND OBJECTIVE BOX
Patient seen and evaluated at bedside  Consult question: sev MR w/ endocarditis.    HPI:  31 yo F w PMH of congenital VSD (uncorrected) presents to [facility] CTS for 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 Initially patient presented to the [facility] ED w chronic cough, SOB, hematemesis & hemoptysis. Pt complained of  chronic cough for the past 2 months, initially non-productive, now slightly productive associated with SOB. Initially she was able to climb one flight of stairs, but the SOB & cough have worsened over the past 3 weeks, now claims that she would be short of breath if she walked 5ft She was seen by her PCP & pulmonologist for the cough & sob and was advised to use inhalers and azithromycin with no relief. Wednesday night  into Thursday AM 11/10 her cough worsened and also had an episode of vomiting, found 10-15 ml of blood mixed with the vomitus. Her cough episode later showed sputum w blood streaks. Claims that this was new, and never happened in the past 2 months. Patient was admited to [facility]. Patient was found to be in Acute HF and an echo was done showing Severe mitral valve regurgitation and . Patient was then transferred to [facility] to  under Dr. Denson for Evaluation of MV regurgitation/endocarditis and VSD. Patient currently admits to coughing that has only been helped by Lasix that was being administered at [facility].  most Recent travel in may to [facility] currently denies Chest pain, fevers, chills, diarrhea, headaches, urinary or bowel changes.   (2022 22:45)    Upon evaluation, patient is lying on bed on room air with no acute complaints. She denies active CP, palpitation, SOB, visual disturbance, dizziness.  Her breathing and orthopnea improved with diuretics at OSH.      PMH:   No pertinent past medical history    Murmur    VSD (ventricular septal defect)      PSH:     Medications:   acetaminophen     Tablet .. 650 milliGRAM(s) Oral every 6 hours PRN  cefTRIAXone   IVPB      coronavirus bivalent (EUA) Booster Vaccine (PFIZER) 0.3 milliLiter(s) IntraMuscular once  enoxaparin Injectable 40 milliGRAM(s) SubCutaneous every 24 hours  gentamicin   IVPB 70 milliGRAM(s) IV Intermittent every 8 hours  metoprolol tartrate 12.5 milliGRAM(s) Oral every 12 hours  pantoprazole    Tablet 40 milliGRAM(s) Oral before breakfast  sodium chloride 0.9% lock flush 3 milliLiter(s) IV Push every 8 hours    Allergies:  Allergy Status Unknown    FAMILY HISTORY:      Review of Systems:  Constitutional: [ ] Fever [ ] Chills [ ] Fatigue [ ] Weight change   HEENT: [ ] Blurred vision [ ] Eye Pain [ ] Headache [ ] Runny nose [ ] Sore Throat   Respiratory: [ ] Cough [ ] Wheezing [ ] Shortness of breath  Cardiovascular: [ ] Chest Pain [ ] Palpitations [ ] TINAJERO [ ] PND [ ] Orthopnea  Gastrointestinal: [ ] Abdominal Pain [ ] Diarrhea [ ] Constipation [ ] Hemorrhoids [ ] Nausea [ ] Vomiting  Genitourinary: [ ] Nocturia [ ] Dysuria [ ] Incontinence  Extremities: [ ] Swelling [ ] Joint Pain  Neurologic: [ ] Focal deficit [ ] Paresthesias [ ] Syncope  Lymphatic: [ ] Swelling [ ] Lymphadenopathy   Skin: [ ] Rash [ ] Ecchymoses [ ] Wounds [ ] Lesions  Psychiatry: [ ] Depression [ ] Suicidal/Homicidal Ideation [ ] Anxiety [ ] Sleep Disturbances  [x ] 10 point review of systems is otherwise negative except as mentioned above            [ ]Unable to obtain    Physical Exam:  T(C): 36.3 (11-15-22 @ 12:32), Max: 37.1 (22 @ 21:49)  HR: 88 (11-15-22 @ 12:32) (88 - 104)  BP: 94/66 (11-15-22 @ 12:32) (94/66 - 105/63)  RR: 18 (11-15-22 @ 12:32) (18 - 18)  SpO2: 97% (11-15-22 @ 12:32) (97% - 98%)  Wt(kg): --     @ 07:01  -  11-15 @ 07:00  --------------------------------------------------------  IN: 170 mL / OUT: 120 mL / NET: 50 mL    11-15 @ 07:01  -  11-15 @ 15:26  --------------------------------------------------------  IN: 100 mL / OUT: 350 mL / NET: -250 mL      Daily Height in cm: 160.02 (2022 21:49)    Daily Weight in k.7 (2022 21:49)    Constitutional: NAD. lying on bed on room air.   HEENT: AT/NC, EOMI, Supple neck;  Respiratory: no wheezing or crackles. no increase in WOB  Cardiovascular: RRR, S1, S2, systolic murmur heard on LLSB and apex. 2+ distal pulses. no JVD, no LE edema.  Gastrointestinal: soft; NT/ND, +BS  Extremities: no cyanosis; non-tender to palpation, DP and Radial pulses intact.  Neurological: A&Ox 3;  Psychiatric: normal mood/affect.          Labs:                        9.7    17.04 )-----------( 434      ( 15 Nov 2022 00:54 )             30.6     11-15    134<L>  |  94<L>  |  11  ----------------------------<  168<H>  3.6   |  26  |  1.00    Ca    9.7      15 Nov 2022 00:54  Phos  3.8     -  Mg     2.0     -14    TPro  8.3  /  Alb  3.8  /  TBili  0.7  /  DBili  x   /  AST  97<H>  /  ALT  93<H>  /  AlkPhos  168<H>  11-15    PT/INR - ( 15 Nov 2022 00:54 )   PT: 13.9 sec;   INR: 1.21 ratio         PTT - ( 15 Nov 2022 00:54 )  PTT:30.8 sec      Serum Pro-Brain Natriuretic Peptide: 567 pg/mL (11-15 @ 00:54)  Serum Pro-Brain Natriuretic Peptide: 2247 pg/mL (11-10 @ 04:03)        Thyroid Stimulating Hormone, Serum: 0.64 uIU/mL (11-15 @ 00:54)

## 2022-11-15 NOTE — PROGRESS NOTE ADULT - ASSESSMENT
33 yo F w PMH of congenital VSD (uncorrected) presents to [facility] CTS for 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 Initially patient presented to the [facility] ED w chronic cough, SOB, hematemesis & hemoptysis. Pt complained of  chronic cough for the past 2 months, initially non-productive, now slightly productive associated with SOB. Initially she was able to climb one flight of stairs, but the SOB & cough have worsened over the past 3 weeks, now claims that she would be short of breath if she walked 5ft She was seen by her PCP & pulmonologist for the cough & sob and was advised to use inhalers and azithromycin with no relief. Wednesda night 11/9 into Thursday AM 11/10 her cough worsened and also had an episode of vomiting, found 10-15 ml of blood mixed with the vomitus. Her cough episode later showed sputum w blood streaks. Claims that this was new, and never happened in the past 2 months. Patient was admited to [facility]. Patient was found to be in Acute HF and an echo was done showing Severe mitral valve regurgitation and . Patient was then transferred to [facility] to 11/14 under Dr. Denson for Evaluation of MV regurgitation/endocarditis and VSD. Patient currently admits to coughing that has only been helped by Lasix that was being administered at [facility].  most Recent travel in may to [facility] currently denies Chest pain, fevers, chills, diarrhea, headaches, urinary or bowel changes.      11/14 Patient seen and examined at bedside. All labs and imaging to be reviewed by Dr. Doll. OR date TBD, continue with Abx regimen on at [facility], reconsult ID in AM. Consult HF?. Preop order workup initiated. Dr. Doll to see patient   11/15  ID   CSLT   + CEFTRIAXONE     TTE,    VSS

## 2022-11-15 NOTE — CONSULT NOTE ADULT - PROBLEM SELECTOR RECOMMENDATION 9
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  - TTE (11/15/22): LVIDd 5.6cm, LVEF 70%, 1.1cm x .6cm vegetation on atrial side of anterior mitral leaflet. Linear mobile echodensity ~ 1.5cm seen on at rial side of posterior mitral annulus concern for vegetation vs. flail posterior leaflet. malcoaptation of MV w/ torrential MR. Severe LAE (DARYL 86), mod LV enlargement. mild RVE w/ normal RVSF.  RA pressure ~8 mmHg based on IVC. normal pulm pressure. L to R flow on VSD.   - Optimize afterload. Patient is currently having SPB in 90-100s. Continue to monitor  - Optimize volume status: patient is euvolemic on exam today. TTE today shows collapsing IVC < 2cm. Monitor off of diuretics for now.  - Infection workup and treatment by primary team and ID.  - Given etiology of MR, ultimately will need MV repair. Appreciate CT surg rec.

## 2022-11-16 LAB
ANION GAP SERPL CALC-SCNC: 11 MMOL/L — SIGNIFICANT CHANGE UP (ref 5–17)
BLD GP AB SCN SERPL QL: NEGATIVE — SIGNIFICANT CHANGE UP
BUN SERPL-MCNC: 11 MG/DL — SIGNIFICANT CHANGE UP (ref 7–23)
CALCIUM SERPL-MCNC: 9.6 MG/DL — SIGNIFICANT CHANGE UP (ref 8.4–10.5)
CHLORIDE SERPL-SCNC: 97 MMOL/L — SIGNIFICANT CHANGE UP (ref 96–108)
CO2 SERPL-SCNC: 27 MMOL/L — SIGNIFICANT CHANGE UP (ref 22–31)
CREAT SERPL-MCNC: 1.04 MG/DL — SIGNIFICANT CHANGE UP (ref 0.5–1.3)
CULTURE RESULTS: SIGNIFICANT CHANGE UP
EGFR: 73 ML/MIN/1.73M2 — SIGNIFICANT CHANGE UP
FIBRINOGEN PPP-MCNC: 623 MG/DL — HIGH (ref 200–445)
GLUCOSE SERPL-MCNC: 95 MG/DL — SIGNIFICANT CHANGE UP (ref 70–99)
HCT VFR BLD CALC: 28.6 % — LOW (ref 34.5–45)
HGB BLD-MCNC: 8.9 G/DL — LOW (ref 11.5–15.5)
MCHC RBC-ENTMCNC: 25.7 PG — LOW (ref 27–34)
MCHC RBC-ENTMCNC: 31.1 GM/DL — LOW (ref 32–36)
MCV RBC AUTO: 82.7 FL — SIGNIFICANT CHANGE UP (ref 80–100)
NRBC # BLD: 0 /100 WBCS — SIGNIFICANT CHANGE UP (ref 0–0)
PLATELET # BLD AUTO: 434 K/UL — HIGH (ref 150–400)
POTASSIUM SERPL-MCNC: 4.2 MMOL/L — SIGNIFICANT CHANGE UP (ref 3.5–5.3)
POTASSIUM SERPL-SCNC: 4.2 MMOL/L — SIGNIFICANT CHANGE UP (ref 3.5–5.3)
RBC # BLD: 3.46 M/UL — LOW (ref 3.8–5.2)
RBC # FLD: 14.1 % — SIGNIFICANT CHANGE UP (ref 10.3–14.5)
RH IG SCN BLD-IMP: POSITIVE — SIGNIFICANT CHANGE UP
SODIUM SERPL-SCNC: 135 MMOL/L — SIGNIFICANT CHANGE UP (ref 135–145)
SPECIMEN SOURCE: SIGNIFICANT CHANGE UP
WBC # BLD: 14.38 K/UL — HIGH (ref 3.8–10.5)
WBC # FLD AUTO: 14.38 K/UL — HIGH (ref 3.8–10.5)

## 2022-11-16 PROCEDURE — 71045 X-RAY EXAM CHEST 1 VIEW: CPT | Mod: 26

## 2022-11-16 PROCEDURE — 70545 MR ANGIOGRAPHY HEAD W/DYE: CPT | Mod: 26,59

## 2022-11-16 PROCEDURE — 70552 MRI BRAIN STEM W/DYE: CPT | Mod: 26

## 2022-11-16 PROCEDURE — 99232 SBSQ HOSP IP/OBS MODERATE 35: CPT

## 2022-11-16 RX ADMIN — Medication 103.5 MILLIGRAM(S): at 03:28

## 2022-11-16 RX ADMIN — Medication 12.5 MILLIGRAM(S): at 10:13

## 2022-11-16 RX ADMIN — CEFTRIAXONE 100 MILLIGRAM(S): 500 INJECTION, POWDER, FOR SOLUTION INTRAMUSCULAR; INTRAVENOUS at 10:21

## 2022-11-16 RX ADMIN — SODIUM CHLORIDE 3 MILLILITER(S): 9 INJECTION INTRAMUSCULAR; INTRAVENOUS; SUBCUTANEOUS at 13:30

## 2022-11-16 RX ADMIN — Medication 12.5 MILLIGRAM(S): at 21:13

## 2022-11-16 RX ADMIN — Medication 103.5 MILLIGRAM(S): at 17:36

## 2022-11-16 RX ADMIN — SODIUM CHLORIDE 3 MILLILITER(S): 9 INJECTION INTRAMUSCULAR; INTRAVENOUS; SUBCUTANEOUS at 05:31

## 2022-11-16 RX ADMIN — Medication 103.5 MILLIGRAM(S): at 10:13

## 2022-11-16 RX ADMIN — SODIUM CHLORIDE 3 MILLILITER(S): 9 INJECTION INTRAMUSCULAR; INTRAVENOUS; SUBCUTANEOUS at 21:11

## 2022-11-16 RX ADMIN — PANTOPRAZOLE SODIUM 40 MILLIGRAM(S): 20 TABLET, DELAYED RELEASE ORAL at 05:32

## 2022-11-16 RX ADMIN — ENOXAPARIN SODIUM 40 MILLIGRAM(S): 100 INJECTION SUBCUTANEOUS at 05:33

## 2022-11-16 NOTE — PROGRESS NOTE ADULT - ASSESSMENT
32 year old with history of unrepaired VSD presents with sob, cough , hemoptysis , fever, fatigue to [facility]. 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 She has no dental problems and no recent dental work except for replacement of a cap.  Denies HA, change i mental status  Feels better since ab started but having very bad burning fro m the pcn infusion.   No hs of hearing issues or vestibular problems      She has severe MR on a TAVO  no abscess noted  Follow up BC are negative    ceftriaxone and genta for endocarditis  MRA ordered by  [facility]  Will need repair or replacement of MV  tolerating ceftriaxone better than pcn and feels much better    check genta trough  ( creat up from baseline genta it  benign  used as synergy as we can keep levels  low

## 2022-11-16 NOTE — PROGRESS NOTE ADULT - SUBJECTIVE AND OBJECTIVE BOX
Subjective: "Good morning"  Sitting up in bed  Denies complaints    Tele:     SR  80-90                           T(C): 37 (11-16-22 @ 04:25), Max: 37 (11-16-22 @ 04:25)  HR: 92 (11-16-22 @ 04:25) (88 - 102)  BP: 95/63 (11-16-22 @ 04:25) (89/59 - 98/60)  RR: 18 (11-16-22 @ 04:25) (18 - 18)  SpO2: 98% (11-16-22 @ 04:25) (97% - 99%)        11-16    135  |  97  |  11  ----------------------------<  95  4.2   |  27  |  1.04    Ca    9.6      16 Nov 2022 05:49  Mg     2.0     11-14    TPro  8.3  /  Alb  3.8  /  TBili  0.7  /  DBili  x   /  AST  97<H>  /  ALT  93<H>  /  AlkPhos  168<H>  11-15                               8.9    14.38 )-----------( 434      ( 16 Nov 2022 05:49 )             28.6        PT/INR - ( 15 Nov 2022 00:54 )   PT: 13.9 sec;   INR: 1.21 ratio         PTT - ( 15 Nov 2022 00:54 )  PTT:30.8 sec  Echo: < from: TTE with Doppler (w/Cont) (11.15.22 @ 07:05) >  Conclusions:  1. Echodensity suspicious for a vegetation measuring  approximately 1.1 cm x 0.6 cm is seen on the atrial side of  tip of the anterior mitral leaflet. Linear, mobile  echodensity measuring approximately 1.5 cm in length seen  on the atrial side of the posterior mitral annulus could  represent vegetation vs. flail portion of the posterior  leaflet. There is malcoaptation of the leaflets. Severe  ("torrential") mitral regurgitation. Systolic flow reversal  seen in a left-sided pulmonary vein.  2. Normal trileaflet aortic valve. No aortic valve  regurgitation seen.  3. Endocardial visualization enhanced with intravenous  injection of Ultrasonic Enhancing Agent (Lumason). Normal  left ventricular systolic function. No segmental wall  motion abnormalities. Septal motion suggestive of right  ventricular overload.  4. Borderline/mild right ventricular enlargement with  normal right ventricular systolic function. Basal RV  diameterabout 4.1 cm.  5. Left-to-right color doppler flow seen across the  ventricular septum consistent with membranous ventricular  septal defect. Qp/Qs calculation would be inaccurate in the  setting of severe mitral regurgitation.    < end of copied text >        Assessment    Neurology: alert and oriented x 3    CV :  S1  S2  RRR    Lungs:   CTA B/L    Abdomen: soft, nontender, nondistended, positive bowel sounds,    Extremities:     no edema      MEDICATIONS  (STANDING):  cefTRIAXone   IVPB      cefTRIAXone   IVPB 2000 milliGRAM(s) IV Intermittent every 24 hours  coronavirus bivalent (EUA) Booster Vaccine (PFIZER) 0.3 milliLiter(s) IntraMuscular once  enoxaparin Injectable 40 milliGRAM(s) SubCutaneous every 24 hours  gentamicin   IVPB 70 milliGRAM(s) IV Intermittent every 8 hours  metoprolol tartrate 12.5 milliGRAM(s) Oral every 12 hours  pantoprazole    Tablet 40 milliGRAM(s) Oral before breakfast  sodium chloride 0.9% lock flush 3 milliLiter(s) IV Push every 8 hours       PAST MEDICAL & SURGICAL HISTORY:  Murmur      VSD (ventricular septal defect)

## 2022-11-16 NOTE — PROGRESS NOTE ADULT - ASSESSMENT
33 yo F w PMH of congenital VSD (uncorrected) presents to [facility] CTS for 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 Initially patient presented to the [facility] ED w chronic cough, SOB, hematemesis & hemoptysis. Pt complained of  chronic cough for the past 2 months, initially non-productive, now slightly productive associated with SOB. Initially she was able to climb one flight of stairs, but the SOB & cough have worsened over the past 3 weeks, now claims that she would be short of breath if she walked 5ft She was seen by her PCP & pulmonologist for the cough & sob and was advised to use inhalers and azithromycin with no relief. Wednesda night 11/9 into Thursday AM 11/10 her cough worsened and also had an episode of vomiting, found 10-15 ml of blood mixed with the vomitus. Her cough episode later showed sputum w blood streaks. Claims that this was new, and never happened in the past 2 months. Patient was admited to [facility]. Patient was found to be in Acute HF and an echo was done showing Severe mitral valve regurgitation and . Patient was then transferred to [facility] to 11/14 under Dr. Denson for Evaluation of MV regurgitation/endocarditis and VSD. Patient currently admits to coughing that has only been helped by Lasix that was being administered at [facility].  most Recent travel in may to [facility] currently denies Chest pain, fevers, chills, diarrhea, headaches, urinary or bowel changes.      11/14 Patient seen and examined at bedside. All labs and imaging to be reviewed by Dr. Doll. OR date TBD, continue with Abx regimen on at [facility], reconsult ID in AM. Consult HF?. Preop order workup initiated. Dr. Doll to see patient   11/15  ID   CSLT   + CEFTRIAXONE     TTE,    VSS   11/16 Continue Gent/Rocephin ID following  Await MR brain results

## 2022-11-16 NOTE — PROGRESS NOTE ADULT - SUBJECTIVE AND OBJECTIVE BOX
infectious diseases progress note:    Patient is a 32y old  Female who presents with a chief complaint of MV Endocarditis/ VSD (15 Nov 2022 15:26)        Shortness of breath          ROS:  CONSTITUTIONAL:  Negative fever or chills, feels well, good appetite  EYES:  Negative  blurry vision or double vision  CARDIOVASCULAR:  Negative for chest pain or palpitations  RESPIRATORY:  Negative for cough, wheezing, or SOB   GASTROINTESTINAL:  Negative for nausea, vomiting, diarrhea, constipation, or abdominal pain  GENITOURINARY:  Negative frequency, urgency or dysuria  NEUROLOGIC:  No headache, confusion, dizziness, lightheadedness    Allergies    Allergy Status Unknown    Intolerances        ANTIBIOTICS/RELEVANT:  antimicrobials  cefTRIAXone   IVPB      cefTRIAXone   IVPB 2000 milliGRAM(s) IV Intermittent every 24 hours  gentamicin   IVPB 70 milliGRAM(s) IV Intermittent every 8 hours    immunologic:  coronavirus bivalent (EUA) Booster Vaccine (PFIZER) 0.3 milliLiter(s) IntraMuscular once    OTHER:  acetaminophen     Tablet .. 650 milliGRAM(s) Oral every 6 hours PRN  enoxaparin Injectable 40 milliGRAM(s) SubCutaneous every 24 hours  metoprolol tartrate 12.5 milliGRAM(s) Oral every 12 hours  pantoprazole    Tablet 40 milliGRAM(s) Oral before breakfast  sodium chloride 0.9% lock flush 3 milliLiter(s) IV Push every 8 hours      Objective:  Vital Signs Last 24 Hrs  T(C): 37 (2022 04:25), Max: 37 (2022 04:25)  T(F): 98.6 (2022 04:25), Max: 98.6 (2022 04:25)  HR: 92 (2022 04:25) (88 - 102)  BP: 95/63 (2022 04:25) (89/59 - 98/60)  BP(mean): --  RR: 18 (2022 04:25) (18 - 18)  SpO2: 98% (2022 04:25) (97% - 99%)    Parameters below as of 2022 04:25  Patient On (Oxygen Delivery Method): room air        PHYSICAL EXAM:  Constitutional:Well-developed, well nourished--no acute distress  Eyes:JAMIE, EOMI  Ear/Nose/Throat: no oral lesion, no sinus tenderness on percussion	  Neck:no JVD, no lymphadenopathy, supple  Respiratory: CTA sobeida  Cardiovascular: S1S2 RRR, no murmurs  Gastrointestinal:soft, (+) BS, no HSM  Extremities:no e/e/c        LABS:                        8.9    14.38 )-----------( 434      ( 2022 05:49 )             28.6         135  |  97  |  11  ----------------------------<  95  4.2   |  27  |  1.04    Ca    9.6      2022 05:49  Mg     2.0         TPro  8.3  /  Alb  3.8  /  TBili  0.7  /  DBili  x   /  AST  97<H>  /  ALT  93<H>  /  AlkPhos  168<H>  11-15    PT/INR - ( 15 Nov 2022 00:54 )   PT: 13.9 sec;   INR: 1.21 ratio         PTT - ( 15 Nov 2022 00:54 )  PTT:30.8 sec  Urinalysis Basic - ( 15 Nov 2022 01:26 )    Color: Yellow / Appearance: Slightly Turbid / S.012 / pH: x  Gluc: x / Ketone: Negative  / Bili: Negative / Urobili: Negative   Blood: x / Protein: Trace / Nitrite: Negative   Leuk Esterase: Negative / RBC: 1 /hpf / WBC 1 /HPF   Sq Epi: x / Non Sq Epi: 7 /hpf / Bacteria: Negative          MICROBIOLOGY:    RECENT CULTURES:  11-15 @ 00:56 .Blood Blood                No growth to date.     @ 05:24 .Blood None                No growth to date.     @ 18:59 .Blood Blood-Peripheral       Growth in anaerobic bottle: Gram positive cocci in pairs           Growth in anaerobic bottle: Gram positive cocci in pairs     @ 11:00 .Blood Blood                No growth to date.     @ 08:11 .Blood None       Growth in anaerobic bottle: Gram positive cocci in pairs  Growth in aerobic bottle: Gram positive cocci in pairs           Growth in anaerobic bottle: Most closely resembling Gemella species  "Susceptibilities not performed"  Growth in aerobic bottle: Gram positive cocci in pairs    11-10 @ 16:54 .Blood None       Growth in aerobic bottle: Gram positive cocci in pairs  Growth in anaerobic bottle: Gram positive cocci in pairs           Growth in aerobic and anaerobic bottles: Gram positive cocci in pairs  Most closely resembling Gemella species "Susceptibilities not performed"    11-10 @ 12:26 .Blood None   PCR    Growth in anaerobic bottle: Gram positive cocci in pairs  Growth in aerobic bottle: Gram positive cocci in pairs    Blood Culture PCR  Blood Culture PCR     Growth in aerobic and anaerobic bottles: Gram positive cocci in pairs  Most closely resembling Gemella species  "Susceptibilities not performed"  ***Blood Panel PCR results on this specimen are available  approximately 3 hours after the Gram stain result.***  Gram stain, PCR, and/or culture results may not always  correspond due to difference in methodologies.  ************************************************************  This PCR assay was performed by multiplex PCR. This  Assay tests for 66 bacterial and resistance gene targets.  Please refer to the [facility] test directory  at [email or web address] for details.    11-10 @ 10:44 Clean Catch Clean Catch (Midstream)                <10,000 CFU/mL Normal Urogenital Silke          RESPIRATORY CULTURES:              RADIOLOGY & ADDITIONAL STUDIES:        Pager [phone]  After 5 pm/weekends or if no response :[phone]

## 2022-11-17 LAB
ANION GAP SERPL CALC-SCNC: 11 MMOL/L — SIGNIFICANT CHANGE UP (ref 5–17)
BUN SERPL-MCNC: 12 MG/DL — SIGNIFICANT CHANGE UP (ref 7–23)
CALCIUM SERPL-MCNC: 9.5 MG/DL — SIGNIFICANT CHANGE UP (ref 8.4–10.5)
CHLORIDE SERPL-SCNC: 99 MMOL/L — SIGNIFICANT CHANGE UP (ref 96–108)
CO2 SERPL-SCNC: 27 MMOL/L — SIGNIFICANT CHANGE UP (ref 22–31)
CREAT SERPL-MCNC: 0.97 MG/DL — SIGNIFICANT CHANGE UP (ref 0.5–1.3)
EGFR: 79 ML/MIN/1.73M2 — SIGNIFICANT CHANGE UP
GENTAMICIN TROUGH SERPL-MCNC: <0.6 UG/ML — SIGNIFICANT CHANGE UP (ref 0–2)
GLUCOSE SERPL-MCNC: 109 MG/DL — HIGH (ref 70–99)
HCT VFR BLD CALC: 27.5 % — LOW (ref 34.5–45)
HGB BLD-MCNC: 8.6 G/DL — LOW (ref 11.5–15.5)
MCHC RBC-ENTMCNC: 25.9 PG — LOW (ref 27–34)
MCHC RBC-ENTMCNC: 31.3 GM/DL — LOW (ref 32–36)
MCV RBC AUTO: 82.8 FL — SIGNIFICANT CHANGE UP (ref 80–100)
NRBC # BLD: 0 /100 WBCS — SIGNIFICANT CHANGE UP (ref 0–0)
PLATELET # BLD AUTO: 458 K/UL — HIGH (ref 150–400)
POTASSIUM SERPL-MCNC: 4 MMOL/L — SIGNIFICANT CHANGE UP (ref 3.5–5.3)
POTASSIUM SERPL-SCNC: 4 MMOL/L — SIGNIFICANT CHANGE UP (ref 3.5–5.3)
RBC # BLD: 3.32 M/UL — LOW (ref 3.8–5.2)
RBC # FLD: 14.6 % — HIGH (ref 10.3–14.5)
SODIUM SERPL-SCNC: 137 MMOL/L — SIGNIFICANT CHANGE UP (ref 135–145)
WBC # BLD: 14.24 K/UL — HIGH (ref 3.8–10.5)
WBC # FLD AUTO: 14.24 K/UL — HIGH (ref 3.8–10.5)

## 2022-11-17 PROCEDURE — 99222 1ST HOSP IP/OBS MODERATE 55: CPT

## 2022-11-17 PROCEDURE — 99232 SBSQ HOSP IP/OBS MODERATE 35: CPT

## 2022-11-17 RX ADMIN — SODIUM CHLORIDE 3 MILLILITER(S): 9 INJECTION INTRAMUSCULAR; INTRAVENOUS; SUBCUTANEOUS at 16:55

## 2022-11-17 RX ADMIN — CEFTRIAXONE 100 MILLIGRAM(S): 500 INJECTION, POWDER, FOR SOLUTION INTRAMUSCULAR; INTRAVENOUS at 09:59

## 2022-11-17 RX ADMIN — SODIUM CHLORIDE 3 MILLILITER(S): 9 INJECTION INTRAMUSCULAR; INTRAVENOUS; SUBCUTANEOUS at 21:18

## 2022-11-17 RX ADMIN — SODIUM CHLORIDE 3 MILLILITER(S): 9 INJECTION INTRAMUSCULAR; INTRAVENOUS; SUBCUTANEOUS at 05:25

## 2022-11-17 RX ADMIN — Medication 103.5 MILLIGRAM(S): at 03:33

## 2022-11-17 RX ADMIN — Medication 12.5 MILLIGRAM(S): at 21:16

## 2022-11-17 RX ADMIN — Medication 103.5 MILLIGRAM(S): at 10:46

## 2022-11-17 RX ADMIN — Medication 103.5 MILLIGRAM(S): at 17:29

## 2022-11-17 RX ADMIN — Medication 12.5 MILLIGRAM(S): at 09:59

## 2022-11-17 RX ADMIN — PANTOPRAZOLE SODIUM 40 MILLIGRAM(S): 20 TABLET, DELAYED RELEASE ORAL at 05:07

## 2022-11-17 NOTE — PROGRESS NOTE ADULT - ASSESSMENT
31 yo F w PMH of congenital VSD (uncorrected) presents to [facility] CTS for 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 Initially patient presented to the [facility] ED w chronic cough, SOB, hematemesis & hemoptysis. Pt complained of  chronic cough for the past 2 months, initially non-productive, now slightly productive associated with SOB. Initially she was able to climb one flight of stairs, but the SOB & cough have worsened over the past 3 weeks, now claims that she would be short of breath if she walked 5ft She was seen by her PCP & pulmonologist for the cough & sob and was advised to use inhalers and azithromycin with no relief. Wednesda night 11/9 into Thursday AM 11/10 her cough worsened and also had an episode of vomiting, found 10-15 ml of blood mixed with the vomitus. Her cough episode later showed sputum w blood streaks. Claims that this was new, and never happened in the past 2 months. Patient was admited to [facility]. Patient was found to be in Acute HF and an echo was done showing Severe mitral valve regurgitation and . Patient was then transferred to [facility] to 11/14 under Dr. Denson for Evaluation of MV regurgitation/endocarditis and VSD. Patient currently admits to coughing that has only been helped by Lasix that was being administered at [facility].  most Recent travel in may to [facility] currently denies Chest pain, fevers, chills, diarrhea, headaches, urinary or bowel changes.      11/14 Patient seen and examined at bedside. All labs and imaging to be reviewed by Dr. Doll. OR date TBD, continue with Abx regimen on at [facility], reconsult ID in AM. Consult HF?. Preop order workup initiated. Dr. Doll to see patient   11/15  ID   CSLT   + CEFTRIAXONE     TTE,    VSS   11/16 Continue Gent/Rocephin ID following  Await MR brain results  11/17 MRI> no acute findings  Cont Rocephin/Gent Repeat Bld C/S am  OR 11/22

## 2022-11-17 NOTE — PROGRESS NOTE ADULT - SUBJECTIVE AND OBJECTIVE BOX
infectious diseases progress note:    Patient is a 32y old  Female who presents with a chief complaint of MV Endocarditis/ VSD (16 Nov 2022 10:08)        Shortness of breath          ROS:  CONSTITUTIONAL:  Negative fever or chills, feels well, good appetite  EYES:  Negative  blurry vision or double vision  CARDIOVASCULAR:  Negative for chest pain or palpitations  RESPIRATORY:  Negative for cough, wheezing, or SOB   GASTROINTESTINAL:  Negative for nausea, vomiting, diarrhea, constipation, or abdominal pain  GENITOURINARY:  Negative frequency, urgency or dysuria  NEUROLOGIC:  No headache, confusion, dizziness, lightheadedness    Allergies    Allergy Status Unknown    Intolerances        ANTIBIOTICS/RELEVANT:  antimicrobials  cefTRIAXone   IVPB      cefTRIAXone   IVPB 2000 milliGRAM(s) IV Intermittent every 24 hours  gentamicin   IVPB 70 milliGRAM(s) IV Intermittent every 8 hours    immunologic:  coronavirus bivalent (EUA) Booster Vaccine (PFIZER) 0.3 milliLiter(s) IntraMuscular once    OTHER:  acetaminophen     Tablet .. 650 milliGRAM(s) Oral every 6 hours PRN  enoxaparin Injectable 40 milliGRAM(s) SubCutaneous every 24 hours  metoprolol tartrate 12.5 milliGRAM(s) Oral every 12 hours  pantoprazole    Tablet 40 milliGRAM(s) Oral before breakfast  sodium chloride 0.9% lock flush 3 milliLiter(s) IV Push every 8 hours      Objective:  Vital Signs Last 24 Hrs  T(C): 36.8 (17 Nov 2022 04:50), Max: 37 (16 Nov 2022 12:27)  T(F): 98.2 (17 Nov 2022 04:50), Max: 98.6 (16 Nov 2022 12:27)  HR: 92 (17 Nov 2022 04:50) (88 - 105)  BP: 95/68 (17 Nov 2022 05:04) (81/54 - 103/71)  BP(mean): --  RR: 18 (17 Nov 2022 04:50) (18 - 18)  SpO2: 94% (17 Nov 2022 04:50) (94% - 99%)    Parameters below as of 17 Nov 2022 04:50  Patient On (Oxygen Delivery Method): room air           Eyes:JAMIE, EOMI  Ear/Nose/Throat: no oral lesion, no sinus tenderness on percussion	  Neck:no JVD, no lymphadenopathy, supple  Respiratory: CTA sobeida  Cardiovascular: S1S2 RRR, no murmurs  Gastrointestinal:soft, (+) BS, no HSM  Extremities:no e/e/c        LABS:                        8.6    14.24 )-----------( 458      ( 17 Nov 2022 03:45 )             27.5     11-17    137  |  99  |  12  ----------------------------<  109<H>  4.0   |  27  |  0.97    Ca    9.5      17 Nov 2022 03:45              MICROBIOLOGY:    RECENT CULTURES:  11-15 @ 00:56 .Blood Blood                No growth to date.    11-14 @ 16:00 .Blood Blood-Peripheral                No growth to date.    11-14 @ 05:24 .Blood None                No growth to date.    11-13 @ 18:59 .Blood Blood-Peripheral       Growth in anaerobic bottle: Gram positive cocci in pairs           Growth in anaerobic bottle: Gram positive cocci in pairs Most closely  resembling  Gemella species "Susceptibilities not performed"    11-12 @ 11:00 .Blood Blood                No growth to date.    11-11 @ 08:11 .Blood None       Growth in anaerobic bottle: Gram positive cocci in pairs  Growth in aerobic bottle: Gram positive cocci in pairs           Growth in anaerobic bottle: Most closely resembling Gemella species  "Susceptibilities not performed"  Growth in aerobic bottle: Gram positive cocci in pairs    11-10 @ 16:54 .Blood None       Growth in aerobic bottle: Gram positive cocci in pairs  Growth in anaerobic bottle: Gram positive cocci in pairs           Growth in aerobic and anaerobic bottles: Gram positive cocci in pairs  Most closely resembling Gemella species "Susceptibilities not performed"    11-10 @ 12:26 .Blood None   PCR    Growth in anaerobic bottle: Gram positive cocci in pairs  Growth in aerobic bottle: Gram positive cocci in pairs    Blood Culture PCR  Blood Culture PCR     Growth in aerobic and anaerobic bottles: Gram positive cocci in pairs  Most closely resembling Gemella species  "Susceptibilities not performed"  ***Blood Panel PCR results on this specimen are available  approximately 3 hours after the Gram stain result.***  Gram stain, PCR, and/or culture results may not always  correspond due to difference in methodologies.  ************************************************************  This PCR assay was performed by multiplex PCR. This  Assay tests for 66 bacterial and resistance gene targets.  Please refer to the [facility] Labs test directory  at [email or web address] for details.    11-10 @ 10:44 Clean Catch Clean Catch (Midstream)                <10,000 CFU/mL Normal Urogenital Silke          RESPIRATORY CULTURES:              RADIOLOGY & ADDITIONAL STUDIES:        Pager [phone]  After 5 pm/weekends or if no response :[phone]

## 2022-11-17 NOTE — PROGRESS NOTE ADULT - ASSESSMENT
32 year old with history of unrepaired VSD presents with sob, cough , hemoptysis , fever, fatigue to [facility]. 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 She has no dental problems and no recent dental work except for replacement of a cap months ago   Denies HA, change i mental status  Feels better since ab started but having very bad burning fro m the pcn infusion.   No hs of hearing issues or vestibular problems          1. Endocarditis  2. VSD/MR    She has severe MR on a TAVO  no abscess noted  Follow up BC are negative    ceftriaxone and genta for endocarditis  MRA  negative   Will need repair or replacement of MV  tolerating ceftriaxone better than pcn and feels much better    check genta trough  is  level low which is good  we want it under .5 32 year old with history of unrepaired VSD presents with sob, cough , hemoptysis , fever, fatigue to [facility]. 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 She has no dental problems and no recent dental work except for replacement of a cap months ago   Denies HA, change i mental status  Feels better since ab started but having very bad burning fro m the pcn infusion.   No hs of hearing issues or vestibular problems          1. Endocarditis  2. VSD/MR    She has severe MR on a TVAO  no abscess noted  Follow up BC are negative    ceftriaxone and genta for endocarditis  MRA  negative   Will need repair or replacement of MV planned for tues  tolerating ceftriaxone better than pcn and feels much better but BC still positive which is disturbing     check genta trough  is   low which is good  we want it under .5 for synergy    I am raising the dose of the ceftriaxone and if additional BC positive would get CT scan of the abd to ro splenic collections or other foci

## 2022-11-17 NOTE — PROGRESS NOTE ADULT - SUBJECTIVE AND OBJECTIVE BOX
Subjective:  "Hello"   at bedside  Dr Doll discussed surgical plan with pt and her     Tele:   SR 80s                             T(C): 36.7 (11-17-22 @ 12:58), Max: 36.9 (11-16-22 @ 19:39)  HR: 91 (11-17-22 @ 12:58) (91 - 105)  BP: 80/52 (11-17-22 @ 12:58) (80/52 - 103/71)  RR: 18 (11-17-22 @ 12:58) (18 - 18)  SpO2: 97% (11-17-22 @ 12:58) (94% - 99%)        11-17    137  |  99  |  12  ----------------------------<  109<H>  4.0   |  27  |  0.97    Ca    9.5      17 Nov 2022 03:45                                 8.6    14.24 )-----------( 458      ( 17 Nov 2022 03:45 )             27.5                Assessment    Neurology: alert and oriented x 3    CV :  S1  S2  RRR    Lungs:   CTA B/L    Abdomen: soft, nontender, nondistended, positive bowel sounds,    Extremities:     no edema        MEDICATIONS  (STANDING):  cefTRIAXone   IVPB      cefTRIAXone   IVPB 2000 milliGRAM(s) IV Intermittent every 24 hours  coronavirus bivalent (EUA) Booster Vaccine (PFIZER) 0.3 milliLiter(s) IntraMuscular once  enoxaparin Injectable 40 milliGRAM(s) SubCutaneous every 24 hours  gentamicin   IVPB 70 milliGRAM(s) IV Intermittent every 8 hours  metoprolol tartrate 12.5 milliGRAM(s) Oral every 12 hours  pantoprazole    Tablet 40 milliGRAM(s) Oral before breakfast  sodium chloride 0.9% lock flush 3 milliLiter(s) IV Push every 8 hours       PAST MEDICAL & SURGICAL HISTORY:  Murmur      VSD (ventricular septal defect)

## 2022-11-18 LAB
ANION GAP SERPL CALC-SCNC: 11 MMOL/L — SIGNIFICANT CHANGE UP (ref 5–17)
BUN SERPL-MCNC: 9 MG/DL — SIGNIFICANT CHANGE UP (ref 7–23)
CALCIUM SERPL-MCNC: 9.3 MG/DL — SIGNIFICANT CHANGE UP (ref 8.4–10.5)
CHLORIDE SERPL-SCNC: 100 MMOL/L — SIGNIFICANT CHANGE UP (ref 96–108)
CO2 SERPL-SCNC: 25 MMOL/L — SIGNIFICANT CHANGE UP (ref 22–31)
CREAT SERPL-MCNC: 0.87 MG/DL — SIGNIFICANT CHANGE UP (ref 0.5–1.3)
CULTURE RESULTS: SIGNIFICANT CHANGE UP
CULTURE RESULTS: SIGNIFICANT CHANGE UP
EGFR: 90 ML/MIN/1.73M2 — SIGNIFICANT CHANGE UP
GLUCOSE SERPL-MCNC: 99 MG/DL — SIGNIFICANT CHANGE UP (ref 70–99)
GRAM STN FLD: SIGNIFICANT CHANGE UP
HCT VFR BLD CALC: 27.7 % — LOW (ref 34.5–45)
HGB BLD-MCNC: 8.6 G/DL — LOW (ref 11.5–15.5)
MCHC RBC-ENTMCNC: 26.3 PG — LOW (ref 27–34)
MCHC RBC-ENTMCNC: 31 GM/DL — LOW (ref 32–36)
MCV RBC AUTO: 84.7 FL — SIGNIFICANT CHANGE UP (ref 80–100)
NRBC # BLD: 0 /100 WBCS — SIGNIFICANT CHANGE UP (ref 0–0)
PLATELET # BLD AUTO: 529 K/UL — HIGH (ref 150–400)
POTASSIUM SERPL-MCNC: 4.3 MMOL/L — SIGNIFICANT CHANGE UP (ref 3.5–5.3)
POTASSIUM SERPL-SCNC: 4.3 MMOL/L — SIGNIFICANT CHANGE UP (ref 3.5–5.3)
RBC # BLD: 3.27 M/UL — LOW (ref 3.8–5.2)
RBC # FLD: 14.8 % — HIGH (ref 10.3–14.5)
SODIUM SERPL-SCNC: 136 MMOL/L — SIGNIFICANT CHANGE UP (ref 135–145)
SPECIMEN SOURCE: SIGNIFICANT CHANGE UP
WBC # BLD: 13.9 K/UL — HIGH (ref 3.8–10.5)
WBC # FLD AUTO: 13.9 K/UL — HIGH (ref 3.8–10.5)

## 2022-11-18 PROCEDURE — 99232 SBSQ HOSP IP/OBS MODERATE 35: CPT

## 2022-11-18 PROCEDURE — 71260 CT THORAX DX C+: CPT | Mod: 26

## 2022-11-18 PROCEDURE — 74177 CT ABD & PELVIS W/CONTRAST: CPT | Mod: 26

## 2022-11-18 PROCEDURE — 93010 ELECTROCARDIOGRAM REPORT: CPT

## 2022-11-18 RX ORDER — CEFTRIAXONE 500 MG/1
2000 INJECTION, POWDER, FOR SOLUTION INTRAMUSCULAR; INTRAVENOUS EVERY 12 HOURS
Refills: 0 | Status: DISCONTINUED | OUTPATIENT
Start: 2022-11-18 | End: 2022-11-22

## 2022-11-18 RX ADMIN — Medication 12.5 MILLIGRAM(S): at 09:59

## 2022-11-18 RX ADMIN — Medication 103.5 MILLIGRAM(S): at 17:01

## 2022-11-18 RX ADMIN — CEFTRIAXONE 100 MILLIGRAM(S): 500 INJECTION, POWDER, FOR SOLUTION INTRAMUSCULAR; INTRAVENOUS at 09:59

## 2022-11-18 RX ADMIN — Medication 103.5 MILLIGRAM(S): at 02:53

## 2022-11-18 RX ADMIN — CEFTRIAXONE 100 MILLIGRAM(S): 500 INJECTION, POWDER, FOR SOLUTION INTRAMUSCULAR; INTRAVENOUS at 23:05

## 2022-11-18 RX ADMIN — SODIUM CHLORIDE 3 MILLILITER(S): 9 INJECTION INTRAMUSCULAR; INTRAVENOUS; SUBCUTANEOUS at 13:00

## 2022-11-18 RX ADMIN — PANTOPRAZOLE SODIUM 40 MILLIGRAM(S): 20 TABLET, DELAYED RELEASE ORAL at 06:02

## 2022-11-18 RX ADMIN — Medication 12.5 MILLIGRAM(S): at 23:04

## 2022-11-18 RX ADMIN — SODIUM CHLORIDE 3 MILLILITER(S): 9 INJECTION INTRAMUSCULAR; INTRAVENOUS; SUBCUTANEOUS at 06:11

## 2022-11-18 RX ADMIN — SODIUM CHLORIDE 3 MILLILITER(S): 9 INJECTION INTRAMUSCULAR; INTRAVENOUS; SUBCUTANEOUS at 23:35

## 2022-11-18 RX ADMIN — Medication 103.5 MILLIGRAM(S): at 09:59

## 2022-11-18 NOTE — PROGRESS NOTE ADULT - ASSESSMENT
31 yo F w PMH of congenital VSD (uncorrected) presents to [facility] CTS for 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 Initially patient presented to the [facility] ED w chronic cough, SOB, hematemesis & hemoptysis. Pt complained of  chronic cough for the past 2 months, initially non-productive, now slightly productive associated with SOB. Initially she was able to climb one flight of stairs, but the SOB & cough have worsened over the past 3 weeks, now claims that she would be short of breath if she walked 5ft She was seen by her PCP & pulmonologist for the cough & sob and was advised to use inhalers and azithromycin with no relief. Wednesda night 11/9 into Thursday AM 11/10 her cough worsened and also had an episode of vomiting, found 10-15 ml of blood mixed with the vomitus. Her cough episode later showed sputum w blood streaks. Claims that this was new, and never happened in the past 2 months. Patient was admited to [facility]. Patient was found to be in Acute HF and an echo was done showing Severe mitral valve regurgitation and . Patient was then transferred to [facility] to 11/14 under Dr. Denson for Evaluation of MV regurgitation/endocarditis and VSD. Patient currently admits to coughing that has only been helped by Lasix that was being administered at [facility].  most Recent travel in may to [facility] currently denies Chest pain, fevers, chills, diarrhea, headaches, urinary or bowel changes.      11/14 Patient seen and examined at bedside. All labs and imaging to be reviewed by Dr. Doll. OR date TBD, continue with Abx regimen on at [facility], reconsult ID in AM. Consult HF?. Preop order workup initiated. Dr. Doll to see patient   11/15  ID   CSLT   + CEFTRIAXONE     TTE,    VSS   11/16 Continue Gent/Rocephin ID following  Await MR brain results  11/17 MRI> no acute findings  Cont Rocephin/Gent Repeat Bld C/S am  OR 11/22 11/18 Bld C/S  11/15 [facility]> ID following  Continue gent/Rocephin   OR  11/22

## 2022-11-18 NOTE — PROGRESS NOTE ADULT - SUBJECTIVE AND OBJECTIVE BOX
infectious diseases progress note:    Patient is a 32y old  Female who presents with a chief complaint of MV Endocarditis/ VSD (17 Nov 2022 16:45)        Shortness of breath          ROS:  CONSTITUTIONAL:  Negative fever or chills, feels well, good appetite  EYES:  Negative  blurry vision or double vision  CARDIOVASCULAR:  Negative for chest pain or palpitations  RESPIRATORY:  Negative for cough, wheezing, or SOB   GASTROINTESTINAL:  Negative for nausea, vomiting, diarrhea, constipation, or abdominal pain  GENITOURINARY:  Negative frequency, urgency or dysuria  NEUROLOGIC:  No headache, confusion, dizziness, lightheadedness    Allergies    Allergy Status Unknown    Intolerances        ANTIBIOTICS/RELEVANT:  antimicrobials  cefTRIAXone   IVPB      cefTRIAXone   IVPB 2000 milliGRAM(s) IV Intermittent every 24 hours  gentamicin   IVPB 70 milliGRAM(s) IV Intermittent every 8 hours    immunologic:  coronavirus bivalent (EUA) Booster Vaccine (PFIZER) 0.3 milliLiter(s) IntraMuscular once    OTHER:  acetaminophen     Tablet .. 650 milliGRAM(s) Oral every 6 hours PRN  enoxaparin Injectable 40 milliGRAM(s) SubCutaneous every 24 hours  metoprolol tartrate 12.5 milliGRAM(s) Oral every 12 hours  pantoprazole    Tablet 40 milliGRAM(s) Oral before breakfast  sodium chloride 0.9% lock flush 3 milliLiter(s) IV Push every 8 hours      Objective:  Vital Signs Last 24 Hrs  T(C): 36.7 (18 Nov 2022 00:27), Max: 36.8 (17 Nov 2022 19:36)  T(F): 98.1 (18 Nov 2022 00:27), Max: 98.2 (17 Nov 2022 19:36)  HR: 103 (18 Nov 2022 09:50) (90 - 103)  BP: 108/75 (18 Nov 2022 09:50) (80/52 - 108/75)  BP(mean): --  RR: 17 (18 Nov 2022 09:50) (17 - 18)  SpO2: 98% (18 Nov 2022 09:50) (96% - 99%)    Parameters below as of 18 Nov 2022 09:50  Patient On (Oxygen Delivery Method): room air         Eyes:JAMIE, EOMI  Ear/Nose/Throat: no oral lesion, no sinus tenderness on percussion	  Neck:no JVD, no lymphadenopathy, supple  Respiratory: CTA sobeida  Cardiovascular: S1S2 RRR, no murmurs  Gastrointestinal:soft, (+) BS, no HSM  Extremities:no e/e/c        LABS:                        8.6    13.90 )-----------( 529      ( 18 Nov 2022 07:44 )             27.7     11-18    136  |  100  |  9   ----------------------------<  99  4.3   |  25  |  0.87    Ca    9.3      18 Nov 2022 07:44              MICROBIOLOGY:    RECENT CULTURES:  11-15 @ 00:56 .Blood Blood                No growth to date.    11-14 @ 16:00 .Blood Blood-Peripheral                No growth to date.    11-14 @ 05:24 .Blood None                No growth to date.    11-13 @ 18:59 .Blood Blood-Peripheral       Growth in anaerobic bottle: Gram positive cocci in pairs  Growth in aerobic bottle: Gram positive cocci in pairs           Growth in anaerobic bottle: Gram positive cocci in pairs Most closely  resembling  Gemella species "Susceptibilities not performed"  Growth in aerobic bottle: Gram positive cocci in pairs    11-12 @ 11:00 .Blood Blood                No Growth Final          RESPIRATORY CULTURES:              RADIOLOGY & ADDITIONAL STUDIES:        Pager [phone]  After 5 pm/weekends or if no response :[phone]

## 2022-11-18 NOTE — PROGRESS NOTE ADULT - ASSESSMENT
32 year old with history of unrepaired VSD presents with sob, cough , hemoptysis , fever, fatigue to [facility]. 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 She has no dental problems and no recent dental work except for replacement of a cap months ago   Denies HA, change i mental status  Feels better since ab started but having very bad burning fro m the pcn infusion.   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 VSD/MR    She has severe MR on a TAVO  no abscess noted  Follow up BC are negative    ceftriaxone and genta for endocarditis with S gemelli  MRA  negative   Will need repair or replacement of MV early next week   tolerating ceftriaxone better than pcn and feels much better    check genta trough  is  level low which is good  we want it under .5  it is being used for synergy  no symptoms of genta toxicity and no baseline hearing or vestibular issues  I think we have to us combination therapy for this organism

## 2022-11-18 NOTE — PROGRESS NOTE ADULT - SUBJECTIVE AND OBJECTIVE BOX
Subjective:   "Hello"  Lying in bed    Tele:     SR  80s                           T(C): 36.7 (11-18-22 @ 00:27), Max: 36.8 (11-17-22 @ 19:36)  HR: 103 (11-18-22 @ 09:50) (90 - 103)  BP: 108/75 (11-18-22 @ 09:50) (80/52 - 108/75)  RR: 17 (11-18-22 @ 09:50) (17 - 18)  SpO2: 98% (11-18-22 @ 09:50) (96% - 99%)        11-18    136  |  100  |  9   ----------------------------<  99  4.3   |  25  |  0.87    Ca    9.3      18 Nov 2022 07:44                                 8.6    13.90 )-----------( 529      ( 18 Nov 2022 07:44 )             27.7          Culture - Blood (11.15.22 @ 00:56)    Gram Stain:   Growth in anaerobic bottle: Gram positive cocci in pairs    Specimen Source: .Blood Blood    Culture Results:   Growth in anaerobic bottle: Gram positive cocci in pairs        Assessment    Neurology: alert and oriented x 3    CV :  S1  S2  RRR  MICHELLE  IV/VI    Lungs:   CTA B/L    Abdomen: soft, nontender, nondistended, positive bowel sounds,    Extremities:     no edema        MEDICATIONS  (STANDING):  cefTRIAXone   IVPB      cefTRIAXone   IVPB 2000 milliGRAM(s) IV Intermittent every 24 hours  coronavirus bivalent (EUA) Booster Vaccine (PFIZER) 0.3 milliLiter(s) IntraMuscular once  enoxaparin Injectable 40 milliGRAM(s) SubCutaneous every 24 hours  gentamicin   IVPB 70 milliGRAM(s) IV Intermittent every 8 hours  metoprolol tartrate 12.5 milliGRAM(s) Oral every 12 hours  pantoprazole    Tablet 40 milliGRAM(s) Oral before breakfast  sodium chloride 0.9% lock flush 3 milliLiter(s) IV Push every 8 hours       PAST MEDICAL & SURGICAL HISTORY:  Murmur      VSD (ventricular septal defect)             Anesthesia Type: 2% lidocaine with epinephrine

## 2022-11-19 DIAGNOSIS — I33.0 ACUTE AND SUBACUTE INFECTIVE ENDOCARDITIS: ICD-10-CM

## 2022-11-19 DIAGNOSIS — R04.2 HEMOPTYSIS: ICD-10-CM

## 2022-11-19 DIAGNOSIS — D64.9 ANEMIA, UNSPECIFIED: ICD-10-CM

## 2022-11-19 DIAGNOSIS — I50.31 ACUTE DIASTOLIC (CONGESTIVE) HEART FAILURE: ICD-10-CM

## 2022-11-19 DIAGNOSIS — R05.3 CHRONIC COUGH: ICD-10-CM

## 2022-11-19 DIAGNOSIS — Q21.0 VENTRICULAR SEPTAL DEFECT: ICD-10-CM

## 2022-11-19 DIAGNOSIS — B96.89 OTHER SPECIFIED BACTERIAL AGENTS AS THE CAUSE OF DISEASES CLASSIFIED ELSEWHERE: ICD-10-CM

## 2022-11-19 DIAGNOSIS — I08.1 RHEUMATIC DISORDERS OF BOTH MITRAL AND TRICUSPID VALVES: ICD-10-CM

## 2022-11-19 DIAGNOSIS — J90 PLEURAL EFFUSION, NOT ELSEWHERE CLASSIFIED: ICD-10-CM

## 2022-11-19 LAB
CULTURE RESULTS: SIGNIFICANT CHANGE UP
GRAM STN FLD: SIGNIFICANT CHANGE UP
SPECIMEN SOURCE: SIGNIFICANT CHANGE UP
SPECIMEN SOURCE: SIGNIFICANT CHANGE UP

## 2022-11-19 PROCEDURE — 99233 SBSQ HOSP IP/OBS HIGH 50: CPT

## 2022-11-19 PROCEDURE — 93306 TTE W/DOPPLER COMPLETE: CPT | Mod: 26

## 2022-11-19 RX ADMIN — Medication 103.5 MILLIGRAM(S): at 08:49

## 2022-11-19 RX ADMIN — SODIUM CHLORIDE 3 MILLILITER(S): 9 INJECTION INTRAMUSCULAR; INTRAVENOUS; SUBCUTANEOUS at 06:13

## 2022-11-19 RX ADMIN — PANTOPRAZOLE SODIUM 40 MILLIGRAM(S): 20 TABLET, DELAYED RELEASE ORAL at 06:06

## 2022-11-19 RX ADMIN — Medication 12.5 MILLIGRAM(S): at 08:55

## 2022-11-19 RX ADMIN — SODIUM CHLORIDE 3 MILLILITER(S): 9 INJECTION INTRAMUSCULAR; INTRAVENOUS; SUBCUTANEOUS at 23:06

## 2022-11-19 RX ADMIN — SODIUM CHLORIDE 3 MILLILITER(S): 9 INJECTION INTRAMUSCULAR; INTRAVENOUS; SUBCUTANEOUS at 13:05

## 2022-11-19 RX ADMIN — Medication 103.5 MILLIGRAM(S): at 03:31

## 2022-11-19 RX ADMIN — Medication 103.5 MILLIGRAM(S): at 16:28

## 2022-11-19 RX ADMIN — Medication 12.5 MILLIGRAM(S): at 20:41

## 2022-11-19 RX ADMIN — CEFTRIAXONE 100 MILLIGRAM(S): 500 INJECTION, POWDER, FOR SOLUTION INTRAMUSCULAR; INTRAVENOUS at 10:55

## 2022-11-19 RX ADMIN — CEFTRIAXONE 100 MILLIGRAM(S): 500 INJECTION, POWDER, FOR SOLUTION INTRAMUSCULAR; INTRAVENOUS at 22:59

## 2022-11-19 RX ADMIN — Medication 103.5 MILLIGRAM(S): at 23:32

## 2022-11-19 NOTE — PROGRESS NOTE ADULT - ASSESSMENT
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 Initially patient presented to the [facility] ED w chronic cough, SOB, hematemesis & hemoptysis. Pt complained of  chronic cough for the past 2 months, initially non-productive, now slightly productive associated with SOB. Initially she was able to climb one flight of stairs, but the SOB & cough have worsened over the past 3 weeks, now claims that she would be short of breath if she walked 5ft She was seen by her PCP & pulmonologist for the cough & sob and was advised to use inhalers and azithromycin with no relief. Wednesda night 11/9 into Thursday AM 11/10 her cough worsened and also had an episode of vomiting, found 10-15 ml of blood mixed with the vomitus. Her cough episode later showed sputum w blood streaks. Claims that this was new, and never happened in the past 2 months. Patient was admited to [facility]. Patient was found to be in Acute HF and an echo was done showing Severe mitral valve regurgitation and . Patient was then transferred to [facility] to 11/14 under Dr. Denson for Evaluation of MV regurgitation/endocarditis and VSD. Patient currently admits to coughing that has only been helped by Lasix that was being administered at [facility].  most Recent travel in may to [facility] currently denies Chest pain, fevers, chills, diarrhea, headaches, urinary or bowel changes.      11/14 Patient seen and examined at bedside. All labs and imaging to be reviewed by Dr. Doll. OR date TBD, continue with Abx regimen on at [facility], reconsult ID in AM. Consult HF?. Preop order workup initiated. Dr. Doll to see patient   11/15  ID   CSLT   + CEFTRIAXONE     TTE,    VSS   11/16 Continue Gent/Rocephin ID following  Await MR brain results  11/17 MRI> no acute findings  Cont Rocephin/Gent Repeat Bld C/S am  OR 11/22 11/18 Bld C/S  11/15 GPC> ID following  Continue gent/Rocephin   OR  11/22 11/19 Bld cultures +GPC 11/15. Will repeat echo Monday r/o multivalve envolvement

## 2022-11-19 NOTE — PROVIDER CONTACT NOTE (CRITICAL VALUE NOTIFICATION) - ASSESSMENT
Pt is a&Ox4, VSS. No s/s of infgection. Pt receiving abx Gentamicin and Ceftrtiaxone 2G.
Iv abx continued

## 2022-11-19 NOTE — PROVIDER CONTACT NOTE (CRITICAL VALUE NOTIFICATION) - SITUATION
+BC: Preliminary results growth in the anaerobic bottle gram positive cocci in pairs
11/11 BC- positive (Amended result from [facility])  Growth in anaerobic bottle with gram posityive cocci in pairs most closely to the evelina species   growth in aerobic bottle with grom positive cocci in pairs

## 2022-11-19 NOTE — PROVIDER CONTACT NOTE (CRITICAL VALUE NOTIFICATION) - ACTION/TREATMENT ORDERED:
N/.A
NP aware. No additional interventions at this time. Will continue to monitor.
Continue to monitor patient

## 2022-11-19 NOTE — PROGRESS NOTE ADULT - SUBJECTIVE AND OBJECTIVE BOX
VITAL SIGNS    Telemetry:  SR   Vital Signs Last 24 Hrs  T(C): 36.7 (22 @ 11:48), Max: 37 (22 @ 21:04)  T(F): 98.1 (22 @ 11:48), Max: 98.6 (22 @ 21:04)  HR: 86 (22 @ 11:48) (86 - 103)  BP: 93/60 (22 @ 11:48) (92/59 - 102/67)  RR: 18 (22 @ 11:48) (17 - 18)  SpO2: 96% (22 @ 11:48) (96% - 99%)             @ 07:01  -   @ 07:00  --------------------------------------------------------  IN: 1150 mL / OUT: 600 mL / NET: 550 mL     @ 07:01  -   @ 11:50  --------------------------------------------------------  IN: 340 mL / OUT: 0 mL / NET: 340 mL       Daily     Daily Weight in k.9 (2022 08:43)  Admit Wt: Drug Dosing Weight  Height (cm): 160 (2022 21:49)  Weight (kg): 63.7 (2022 21:49)  BMI (kg/m2): 24.9 (2022 21:49)  BSA (m2): 1.66 (2022 21:49)      CAPILLARY BLOOD GLUCOSE              MEDICATIONS  acetaminophen     Tablet .. 650 milliGRAM(s) Oral every 6 hours PRN  cefTRIAXone   IVPB 2000 milliGRAM(s) IV Intermittent every 12 hours  coronavirus bivalent (EUA) Booster Vaccine (PFIZER) 0.3 milliLiter(s) IntraMuscular once  enoxaparin Injectable 40 milliGRAM(s) SubCutaneous every 24 hours  gentamicin   IVPB 70 milliGRAM(s) IV Intermittent every 8 hours  metoprolol tartrate 12.5 milliGRAM(s) Oral every 12 hours  pantoprazole    Tablet 40 milliGRAM(s) Oral before breakfast  sodium chloride 0.9% lock flush 3 milliLiter(s) IV Push every 8 hours      >>> <<<  PHYSICAL EXAM  Subjective: NAd   Neurology: alert and oriented x 3, nonfocal, no gross deficits  CV : s1s2  Lungs: CTA b/l left lower sternal border pan systolic murmur  Abdomen: soft, NT,ND, ( +)BM  :  voiding  Extremities:       LABS      136  |  100  |  9   ----------------------------<  99  4.3   |  25  |  0.87    Ca    9.3      2022 07:44                                   8.6    13.90 )-----------( 529      ( 2022 07:44 )             27.7                 PAST MEDICAL & SURGICAL HISTORY:  Murmur      VSD (ventricular septal defect)

## 2022-11-20 LAB
ALBUMIN SERPL ELPH-MCNC: 3.4 G/DL — SIGNIFICANT CHANGE UP (ref 3.3–5)
ALP SERPL-CCNC: 115 U/L — SIGNIFICANT CHANGE UP (ref 40–120)
ALT FLD-CCNC: 89 U/L — HIGH (ref 10–45)
ANION GAP SERPL CALC-SCNC: 13 MMOL/L — SIGNIFICANT CHANGE UP (ref 5–17)
AST SERPL-CCNC: 54 U/L — HIGH (ref 10–40)
BILIRUB SERPL-MCNC: 0.3 MG/DL — SIGNIFICANT CHANGE UP (ref 0.2–1.2)
BUN SERPL-MCNC: 10 MG/DL — SIGNIFICANT CHANGE UP (ref 7–23)
CALCIUM SERPL-MCNC: 9.3 MG/DL — SIGNIFICANT CHANGE UP (ref 8.4–10.5)
CHLORIDE SERPL-SCNC: 100 MMOL/L — SIGNIFICANT CHANGE UP (ref 96–108)
CO2 SERPL-SCNC: 24 MMOL/L — SIGNIFICANT CHANGE UP (ref 22–31)
CREAT SERPL-MCNC: 0.81 MG/DL — SIGNIFICANT CHANGE UP (ref 0.5–1.3)
CULTURE RESULTS: SIGNIFICANT CHANGE UP
EGFR: 98 ML/MIN/1.73M2 — SIGNIFICANT CHANGE UP
GLUCOSE SERPL-MCNC: 94 MG/DL — SIGNIFICANT CHANGE UP (ref 70–99)
HCT VFR BLD CALC: 27 % — LOW (ref 34.5–45)
HGB BLD-MCNC: 8.5 G/DL — LOW (ref 11.5–15.5)
MCHC RBC-ENTMCNC: 26.3 PG — LOW (ref 27–34)
MCHC RBC-ENTMCNC: 31.5 GM/DL — LOW (ref 32–36)
MCV RBC AUTO: 83.6 FL — SIGNIFICANT CHANGE UP (ref 80–100)
NRBC # BLD: 0 /100 WBCS — SIGNIFICANT CHANGE UP (ref 0–0)
PLATELET # BLD AUTO: 547 K/UL — HIGH (ref 150–400)
POTASSIUM SERPL-MCNC: 4.1 MMOL/L — SIGNIFICANT CHANGE UP (ref 3.5–5.3)
POTASSIUM SERPL-SCNC: 4.1 MMOL/L — SIGNIFICANT CHANGE UP (ref 3.5–5.3)
PROT SERPL-MCNC: 7.1 G/DL — SIGNIFICANT CHANGE UP (ref 6–8.3)
RBC # BLD: 3.23 M/UL — LOW (ref 3.8–5.2)
RBC # FLD: 15.3 % — HIGH (ref 10.3–14.5)
SODIUM SERPL-SCNC: 137 MMOL/L — SIGNIFICANT CHANGE UP (ref 135–145)
SPECIMEN SOURCE: SIGNIFICANT CHANGE UP
WBC # BLD: 11.53 K/UL — HIGH (ref 3.8–10.5)
WBC # FLD AUTO: 11.53 K/UL — HIGH (ref 3.8–10.5)

## 2022-11-20 PROCEDURE — 99233 SBSQ HOSP IP/OBS HIGH 50: CPT

## 2022-11-20 PROCEDURE — 99232 SBSQ HOSP IP/OBS MODERATE 35: CPT

## 2022-11-20 RX ADMIN — Medication 12.5 MILLIGRAM(S): at 17:25

## 2022-11-20 RX ADMIN — SODIUM CHLORIDE 3 MILLILITER(S): 9 INJECTION INTRAMUSCULAR; INTRAVENOUS; SUBCUTANEOUS at 13:00

## 2022-11-20 RX ADMIN — Medication 103.5 MILLIGRAM(S): at 16:01

## 2022-11-20 RX ADMIN — PANTOPRAZOLE SODIUM 40 MILLIGRAM(S): 20 TABLET, DELAYED RELEASE ORAL at 06:20

## 2022-11-20 RX ADMIN — Medication 103.5 MILLIGRAM(S): at 06:57

## 2022-11-20 RX ADMIN — CEFTRIAXONE 100 MILLIGRAM(S): 500 INJECTION, POWDER, FOR SOLUTION INTRAMUSCULAR; INTRAVENOUS at 23:40

## 2022-11-20 RX ADMIN — SODIUM CHLORIDE 3 MILLILITER(S): 9 INJECTION INTRAMUSCULAR; INTRAVENOUS; SUBCUTANEOUS at 06:30

## 2022-11-20 RX ADMIN — Medication 12.5 MILLIGRAM(S): at 06:57

## 2022-11-20 RX ADMIN — SODIUM CHLORIDE 3 MILLILITER(S): 9 INJECTION INTRAMUSCULAR; INTRAVENOUS; SUBCUTANEOUS at 21:00

## 2022-11-20 RX ADMIN — CEFTRIAXONE 100 MILLIGRAM(S): 500 INJECTION, POWDER, FOR SOLUTION INTRAMUSCULAR; INTRAVENOUS at 10:43

## 2022-11-20 NOTE — PROGRESS NOTE ADULT - ASSESSMENT
33 yo F w PMH of congenital VSD (uncorrected) presents to 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 Initially patient presented to the [facility] ED w chronic cough, SOB, hematemesis & hemoptysis. Pt complained of  chronic cough for the past 2 months, initially non-productive, now slightly productive associated with SOB. Initially she was able to climb one flight of stairs, but the SOB & cough have worsened over the past 3 weeks, now claims that she would be short of breath if she walked 5ft She was seen by her PCP & pulmonologist for the cough & sob and was advised to use inhalers and azithromycin with no relief. Wednesda night 11/9 into Thursday AM 11/10 her cough worsened and also had an episode of vomiting, found 10-15 ml of blood mixed with the vomitus. Her cough episode later showed sputum w blood streaks. Claims that this was new, and never happened in the past 2 months. Patient was admited to [facility]. Patient was found to be in Acute HF and an echo was done showing Severe mitral valve regurgitation and . Patient was then transferred to [facility] to 11/14 under Dr. Denson for Evaluation of MV regurgitation/endocarditis and VSD. Patient currently admits to coughing that has only been helped by Lasix that was being administered at [facility].  most Recent travel in may to [facility] currently denies Chest pain, fevers, chills, diarrhea, headaches, urinary or bowel changes.      11/14 Patient seen and examined at bedside. All labs and imaging to be reviewed by Dr. Doll. OR date TBD, continue with Abx regimen on at [facility], reconsult ID in AM. Consult HF?. Preop order workup initiated. Dr. Doll to see patient   11/15  ID   CSLT   + CEFTRIAXONE     TTE,    VSS   11/16 Continue Gent/Rocephin ID following  Await MR brain results  11/17 MRI> no acute findings  Cont Rocephin/Gent Repeat Bld C/S am  OR 11/22 11/18 Bld C/S  11/15 GPC> ID following  Continue gent/Rocephin   OR  11/22 11/19 Bld cultures +GPC 11/15. Will repeat echo Monday r/o multivalve involvement  11/20 Echo demonstrates a vegetation measuring approximately 1.8cm x 0.5cm is seen on the atrial side of tip of the anterior mitral leaflet. Linear, mobile echodensity measuring approximately 1.5 cm in length seen  on the atrial side of the posterior mitral annulus and mild to moderate TR. CT abd negative for acute pathology. Findings discussed in multidisciplinary rounds. Bld cultures 11/18 NTD.  Continue ceftriaxone / Gent on call to OR Tuesday

## 2022-11-20 NOTE — PROGRESS NOTE ADULT - SUBJECTIVE AND OBJECTIVE BOX
INFECTIOUS DISEASES FOLLOW UP-- Earnestine Mesha  [phone]    This is a follow up note for this  32yFemale with  Shortness of breath        ROS:  CONSTITUTIONAL:  No fever, good appetite  CARDIOVASCULAR:  No chest pain or palpitations  RESPIRATORY:  No dyspnea  GASTROINTESTINAL:  No nausea, vomiting, diarrhea, or abdominal pain  GENITOURINARY:  No dysuria  NEUROLOGIC:  No headache,     Allergies    Allergy Status Unknown    Intolerances        ANTIBIOTICS/RELEVANT:  antimicrobials  cefTRIAXone   IVPB 2000 milliGRAM(s) IV Intermittent every 12 hours  gentamicin   IVPB 70 milliGRAM(s) IV Intermittent every 8 hours    immunologic:  coronavirus bivalent (EUA) Booster Vaccine (PFIZER) 0.3 milliLiter(s) IntraMuscular once    OTHER:  acetaminophen     Tablet .. 650 milliGRAM(s) Oral every 6 hours PRN  enoxaparin Injectable 40 milliGRAM(s) SubCutaneous every 24 hours  metoprolol tartrate 12.5 milliGRAM(s) Oral every 12 hours  pantoprazole    Tablet 40 milliGRAM(s) Oral before breakfast  sodium chloride 0.9% lock flush 3 milliLiter(s) IV Push every 8 hours      Objective:  Vital Signs Last 24 Hrs  T(C): 36.7 (20 Nov 2022 04:54), Max: 36.7 (19 Nov 2022 21:04)  T(F): 98.1 (20 Nov 2022 04:54), Max: 98.1 (19 Nov 2022 21:04)  HR: 88 (20 Nov 2022 07:03) (88 - 106)  BP: 100/69 (20 Nov 2022 07:03) (92/63 - 109/69)  BP(mean): 80 (20 Nov 2022 07:03) (80 - 82)  RR: 18 (20 Nov 2022 04:54) (18 - 18)  SpO2: 97% (20 Nov 2022 04:54) (90% - 97%)    Parameters below as of 20 Nov 2022 04:54  Patient On (Oxygen Delivery Method): room air        PHYSICAL EXAM:  Constitutional:no acute distress  Eyes:JAMIE, EOMI  Ear/Nose/Throat: no oral lesions, 	  Respiratory: clear BL  Cardiovascular: S1S2  Gastrointestinal:soft, (+) BS, no tenderness  Extremities:no e/e/c  No Lymphadenopathy  IV sites not inflammed.    LABS:                        8.5    11.53 )-----------( 547      ( 20 Nov 2022 07:13 )             27.0     11-20    137  |  100  |  10  ----------------------------<  94  4.1   |  24  |  0.81    Ca    9.3      20 Nov 2022 07:03    TPro  7.1  /  Alb  3.4  /  TBili  0.3  /  DBili  x   /  AST  54<H>  /  ALT  89<H>  /  AlkPhos  115  11-20          MICROBIOLOGY:            RECENT CULTURES:  11-18 @ 06:15  .Blood Blood-Peripheral  --  --  --    No growth to date.  --  11-18 @ 06:10  .Blood Blood-Peripheral  --  --  --    No growth to date.  --  11-15 @ 00:56  .Blood Blood  --  --  --    Growth in anaerobic bottle: Gram positive cocci in pairs  --  11-14 @ 16:00  .Blood Blood-Peripheral  --  --  --    No Growth Final  --  11-14 @ 05:24  .Blood None  --  --  --    Growth in anaerobic bottle: Gram Positive Cocci in Pairs and Chains Most  closely resembling Gemella species  "Susceptibilities not performed"  --  11-13 @ 18:59  .Blood Blood-Peripheral  --  --  --    Growth in aerobic and anaerobic bottles: Gram positive cocci in pairs  Most closely resembling  Gemella species "Susceptibilities not performed"  --      RADIOLOGY & ADDITIONAL STUDIES:    < from: CT Chest w/ IV Cont (11.18.22 @ 19:04) >  IMPRESSION:  Stable small right and new small left pleural effusion.    Mild pulmonary interstitial edema.    No acute abdominal pathology.    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        ROS:  CONSTITUTIONAL:  No fever, good appetite  CARDIOVASCULAR:  No chest pain or palpitations  RESPIRATORY:  No dyspnea at rest  GASTROINTESTINAL:  No nausea, vomiting, diarrhea, or abdominal pain  GENITOURINARY:  No dysuria  NEUROLOGIC:  No headache,     Allergies    Allergy Status Unknown    Intolerances        ANTIBIOTICS/RELEVANT:  antimicrobials  cefTRIAXone   IVPB 2000 milliGRAM(s) IV Intermittent every 12 hours  gentamicin   IVPB 70 milliGRAM(s) IV Intermittent every 8 hours    immunologic:  coronavirus bivalent (EUA) Booster Vaccine (PFIZER) 0.3 milliLiter(s) IntraMuscular once    OTHER:  acetaminophen     Tablet .. 650 milliGRAM(s) Oral every 6 hours PRN  enoxaparin Injectable 40 milliGRAM(s) SubCutaneous every 24 hours  metoprolol tartrate 12.5 milliGRAM(s) Oral every 12 hours  pantoprazole    Tablet 40 milliGRAM(s) Oral before breakfast  sodium chloride 0.9% lock flush 3 milliLiter(s) IV Push every 8 hours      Objective:  Vital Signs Last 24 Hrs  T(C): 36.7 (20 Nov 2022 04:54), Max: 36.7 (19 Nov 2022 21:04)  T(F): 98.1 (20 Nov 2022 04:54), Max: 98.1 (19 Nov 2022 21:04)  HR: 88 (20 Nov 2022 07:03) (88 - 106)  BP: 100/69 (20 Nov 2022 07:03) (92/63 - 109/69)  BP(mean): 80 (20 Nov 2022 07:03) (80 - 82)  RR: 18 (20 Nov 2022 04:54) (18 - 18)  SpO2: 97% (20 Nov 2022 04:54) (90% - 97%)    Parameters below as of 20 Nov 2022 04:54  Patient On (Oxygen Delivery Method): room air        PHYSICAL EXAM:  Constitutional:no acute distress  Eyes:JAMIE, EOMI  Ear/Nose/Throat: no oral lesions, 	  Respiratory: clear BL  Cardiovascular: S1S2 holosystolic murmur appreciated  Gastrointestinal:soft, (+) BS, no tenderness  Extremities:no e/e/c no peripheral stigmata of SBE noted  No Lymphadenopathy  IV sites not inflammed.    LABS:                        8.5    11.53 )-----------( 547      ( 20 Nov 2022 07:13 )             27.0     11-20    137  |  100  |  10  ----------------------------<  94  4.1   |  24  |  0.81    Ca    9.3      20 Nov 2022 07:03    TPro  7.1  /  Alb  3.4  /  TBili  0.3  /  DBili  x   /  AST  54<H>  /  ALT  89<H>  /  AlkPhos  115  11-20          MICROBIOLOGY:            RECENT CULTURES:  11-18 @ 06:15  .Blood Blood-Peripheral  --  --  --    No growth to date.  --  11-18 @ 06:10  .Blood Blood-Peripheral  --  --  --    No growth to date.  --  11-15 @ 00:56  .Blood Blood  --  --  --    Growth in anaerobic bottle: Gram positive cocci in pairs  --  11-14 @ 16:00  .Blood Blood-Peripheral  --  --  --    No Growth Final  --  11-14 @ 05:24  .Blood None  --  --  --    Growth in anaerobic bottle: Gram Positive Cocci in Pairs and Chains Most  closely resembling Gemella species  "Susceptibilities not performed"  --  11-13 @ 18:59  .Blood Blood-Peripheral  --  --  --    Growth in aerobic and anaerobic bottles: Gram positive cocci in pairs  Most closely resembling  Gemella species "Susceptibilities not performed"  --      RADIOLOGY & ADDITIONAL STUDIES:    < from: CT Chest w/ IV Cont (11.18.22 @ 19:04) >  IMPRESSION:  Stable small right and new small left pleural effusion.    Mild pulmonary interstitial edema.    No acute abdominal pathology.    < end of copied text >    < from: Transthoracic Echocardiogram (11.19.22 @ 15:36) >  Conclusions:  1. Echodensity suspicious for a vegetation measuring  approximately 1.8cm x 0.5cm is seen on the atrial side of  tip of the anterior mitral leaflet. Linear, mobile  echodensity measuring approximately 1.5 cm in length seen  on the atrial side of the posterior mitral annulus could  represent vegetation vs. flail portion of the posterior  leaflet.  There is malcoaptation of the leaflets. Severe  ("torrential") mitral regurgitation. Systolic flow reversal  seen in a left-sided pulmonary vein.  2. Mild left ventricular enlargement.  3. Hyperdynamic left ventricular systolic function.  4. Normal right ventricular size and function.  5. Estimated right ventricular systolic pressure equals 60  mm Hg, assuming right atrial pressure equals 10 mm Hg,  consistent with moderate pulmonary hypertension.  6. Normal tricuspid valve. Mild-moderate tricuspid  regurgitation.  Discussed with CTS NP [id number]    < end of copied text >  < from: TTE with Doppler (w/Cont) (11.15.22 @ 07:05) >  Conclusions:  1. Echodensity suspicious for a vegetation measuring  approximately 1.1 cm x 0.6 cm is seen on the atrial side of  tip of the anterior mitral leaflet. Linear, mobile  echodensity measuring approximately 1.5 cm in length seen  on the atrial side of the posterior mitral annulus could  represent vegetation vs. flail portion of the posterior  leaflet. There is malcoaptation of the leaflets. Severe  ("torrential") mitral regurgitation. Systolic flow reversal  seen in a left-sided pulmonary vein.  2. Normal trileaflet aortic valve. No aortic valve  regurgitation seen.  3. Endocardial visualization enhanced with intravenous  injection of Ultrasonic Enhancing Agent (Lumason). Normal  left ventricular systolic function. No segmental wall  motion abnormalities. Septal motion suggestive of right  ventricular overload.  4. Borderline/mild right ventricular enlargement with  normal right ventricular systolic function. Basal RV  diameterabout 4.1 cm.  5. Left-to-right color doppler flow seen across the  ventricular septum consistent with membranous ventricular  septal defect. Qp/Qs calculation would be inaccurate in the  setting of severe mitral regurgitation.  Comparison made to TTE/TAVO performed at [facility] 11/10-11/11/2022. Findings are similar.  Ventricular septal is a membranous VSD. Results discussed  with 2 Cristobal CT surgery nurse practitioner.    < end of copied text >

## 2022-11-20 NOTE — PROGRESS NOTE ADULT - ASSESSMENT
32 year old with history of unrepaired VSD presents with sob, cough , hemoptysis , fever, fatigue to [facility]. 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 She has no dental problems and no recent dental work except for replacement of a cap months ago   Denies HA, change i mental status  Feels better since ab started but having very bad burning fro m the pcn infusion.   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 VSD/MR    She has severe MR on a TAVO  no abscess noted  Follow up BC are negative    ceftriaxone and genta for endocarditis with S gemelli  MRA  negative   Will need repair or replacement of MV early next week   tolerating ceftriaxone better than pcn and feels much better    check genta trough  is  level low which is good  we want it under .5  it is being used for synergy  no symptoms of genta toxicity and no baseline hearing or vestibular issues  I think we have to us combination therapy for this organism  for duration of therapy    Ez Zimmer MD  Can be called via Teams  After 5pm/weekends [phone]     32 year old with history of unrepaired VSD presents with sob, cough , hemoptysis , fever, fatigue to [facility]. 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 She has no dental problems and no recent dental work except for replacement of a cap months ago   Denies HA, change i mental status  Feels better since ab started but having very bad burning fro m the pcn infusion.   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 VSD/MR    She has severe MR on a TAVO  no abscess noted  Follow up BC are negative    ceftriaxone and genta for endocarditis with S gemelli  MRA  negative   Will need repair or replacement of MV early next week   tolerating ceftriaxone better than pcn and feels much better    check genta trough  is  level low which is good  we want it under .5  it is being used for synergy  no symptoms of genta toxicity and no baseline hearing or vestibular issues  I think we have to us combination therapy for this organism  for duration of therapy  Given increasing and mobile nature of vegetation favor surgical repair in the very near term    Discussed with 2Cohadam Zimmer MD  Can be called via Teams  After 5pm/weekends [phone]

## 2022-11-20 NOTE — PROGRESS NOTE ADULT - SUBJECTIVE AND OBJECTIVE BOX
VITAL SIGNS    Telemetry:  ST 90's  Vital Signs Last 24 Hrs  T(C): 37.4 (22 @ 13:21), Max: 37.4 (22 @ 13:21)  T(F): 99.3 (22 @ 13:21), Max: 99.3 (22 @ 13:21)  HR: 99 (22 @ 13:21) (88 - 106)  BP: 93/68 (22 @ 13:21) (92/63 - 109/69)  RR: 18 (22 @ 13:21) (18 - 18)  SpO2: 97% (22 @ 13:21) (90% - 97%)             @ 07:01  -   @ 07:00  --------------------------------------------------------  IN: 1043.5 mL / OUT: 0 mL / NET: 1043.5 mL     @ 07:01  -   @ 13:35  --------------------------------------------------------  IN: 791.8 mL / OUT: 0 mL / NET: 791.8 mL       Daily     Daily Weight in k.2 (2022 08:02)  Admit Wt: Drug Dosing Weight  Height (cm): 160 (2022 21:49)  Weight (kg): 63.7 (2022 21:49)  BMI (kg/m2): 24.9 (2022 21:49)  BSA (m2): 1.66 (2022 21:49)    Bilirubin Total, Serum: 0.3 mg/dL ( @ 07:03)    MEDICATIONS  acetaminophen     Tablet .. 650 milliGRAM(s) Oral every 6 hours PRN  cefTRIAXone   IVPB 2000 milliGRAM(s) IV Intermittent every 12 hours  coronavirus bivalent (EUA) Booster Vaccine (PFIZER) 0.3 milliLiter(s) IntraMuscular once  enoxaparin Injectable 40 milliGRAM(s) SubCutaneous every 24 hours  gentamicin   IVPB 70 milliGRAM(s) IV Intermittent every 8 hours  metoprolol tartrate 12.5 milliGRAM(s) Oral every 12 hours  pantoprazole    Tablet 40 milliGRAM(s) Oral before breakfast  sodium chloride 0.9% lock flush 3 milliLiter(s) IV Push every 8 hours      >>> <<<  PHYSICAL EXAM  Subjective:  Neurology: alert and oriented x 3, nonfocal, no gross deficits  CV : s1s2 Grade 4 murmur with palpable thrill  Lungs: CTA  Abdomen: soft, NT,ND, (+ )BM  :  voiding  Extremities: -c/c/e       LABS      137  |  100  |  10  ----------------------------<  94  4.1   |  24  |  0.81    Ca    9.3      2022 07:03    TPro  7.1  /  Alb  3.4  /  TBili  0.3  /  DBili  x   /  AST  54<H>  /  ALT  89<H>  /  AlkPhos  115                                   8.5    11.53 )-----------( 547      ( 2022 07:13 )             27.0                 PAST MEDICAL & SURGICAL HISTORY:  Murmur      VSD (ventricular septal defect)

## 2022-11-21 ENCOUNTER — TRANSCRIPTION ENCOUNTER (OUTPATIENT)
Age: 32
End: 2022-11-21

## 2022-11-21 LAB
ANION GAP SERPL CALC-SCNC: 13 MMOL/L — SIGNIFICANT CHANGE UP (ref 5–17)
BLD GP AB SCN SERPL QL: NEGATIVE — SIGNIFICANT CHANGE UP
BUN SERPL-MCNC: 10 MG/DL — SIGNIFICANT CHANGE UP (ref 7–23)
CALCIUM SERPL-MCNC: 9.5 MG/DL — SIGNIFICANT CHANGE UP (ref 8.4–10.5)
CHLORIDE SERPL-SCNC: 102 MMOL/L — SIGNIFICANT CHANGE UP (ref 96–108)
CO2 SERPL-SCNC: 23 MMOL/L — SIGNIFICANT CHANGE UP (ref 22–31)
CREAT SERPL-MCNC: 0.89 MG/DL — SIGNIFICANT CHANGE UP (ref 0.5–1.3)
CULTURE RESULTS: SIGNIFICANT CHANGE UP
EGFR: 88 ML/MIN/1.73M2 — SIGNIFICANT CHANGE UP
GLUCOSE SERPL-MCNC: 89 MG/DL — SIGNIFICANT CHANGE UP (ref 70–99)
HCG SERPL-ACNC: <2 MIU/ML — SIGNIFICANT CHANGE UP
HCT VFR BLD CALC: 28.5 % — LOW (ref 34.5–45)
HGB BLD-MCNC: 9 G/DL — LOW (ref 11.5–15.5)
MCHC RBC-ENTMCNC: 26.5 PG — LOW (ref 27–34)
MCHC RBC-ENTMCNC: 31.6 GM/DL — LOW (ref 32–36)
MCV RBC AUTO: 84.1 FL — SIGNIFICANT CHANGE UP (ref 80–100)
NRBC # BLD: 0 /100 WBCS — SIGNIFICANT CHANGE UP (ref 0–0)
PLATELET # BLD AUTO: 536 K/UL — HIGH (ref 150–400)
POTASSIUM SERPL-MCNC: 4.6 MMOL/L — SIGNIFICANT CHANGE UP (ref 3.5–5.3)
POTASSIUM SERPL-SCNC: 4.6 MMOL/L — SIGNIFICANT CHANGE UP (ref 3.5–5.3)
RBC # BLD: 3.39 M/UL — LOW (ref 3.8–5.2)
RBC # FLD: 15.7 % — HIGH (ref 10.3–14.5)
RH IG SCN BLD-IMP: POSITIVE — SIGNIFICANT CHANGE UP
SARS-COV-2 RNA SPEC QL NAA+PROBE: SIGNIFICANT CHANGE UP
SODIUM SERPL-SCNC: 138 MMOL/L — SIGNIFICANT CHANGE UP (ref 135–145)
SPECIMEN SOURCE: SIGNIFICANT CHANGE UP
WBC # BLD: 12.98 K/UL — HIGH (ref 3.8–10.5)
WBC # FLD AUTO: 12.98 K/UL — HIGH (ref 3.8–10.5)

## 2022-11-21 PROCEDURE — 99233 SBSQ HOSP IP/OBS HIGH 50: CPT

## 2022-11-21 PROCEDURE — 99232 SBSQ HOSP IP/OBS MODERATE 35: CPT

## 2022-11-21 RX ORDER — CEFUROXIME AXETIL 250 MG
1500 TABLET ORAL ONCE
Refills: 0 | Status: DISCONTINUED | OUTPATIENT
Start: 2022-11-21 | End: 2022-11-21

## 2022-11-21 RX ORDER — CHLORHEXIDINE GLUCONATE 213 G/1000ML
30 SOLUTION TOPICAL ONCE
Refills: 0 | Status: COMPLETED | OUTPATIENT
Start: 2022-11-21 | End: 2022-11-22

## 2022-11-21 RX ORDER — CHLORHEXIDINE GLUCONATE 213 G/1000ML
1 SOLUTION TOPICAL ONCE
Refills: 0 | Status: COMPLETED | OUTPATIENT
Start: 2022-11-21 | End: 2022-11-21

## 2022-11-21 RX ORDER — VANCOMYCIN HCL 1 G
1000 VIAL (EA) INTRAVENOUS ONCE
Refills: 0 | Status: DISCONTINUED | OUTPATIENT
Start: 2022-11-21 | End: 2022-11-22

## 2022-11-21 RX ADMIN — CHLORHEXIDINE GLUCONATE 1 APPLICATION(S): 213 SOLUTION TOPICAL at 21:24

## 2022-11-21 RX ADMIN — Medication 12.5 MILLIGRAM(S): at 10:40

## 2022-11-21 RX ADMIN — Medication 12.5 MILLIGRAM(S): at 21:35

## 2022-11-21 RX ADMIN — SODIUM CHLORIDE 3 MILLILITER(S): 9 INJECTION INTRAMUSCULAR; INTRAVENOUS; SUBCUTANEOUS at 05:44

## 2022-11-21 RX ADMIN — Medication 103.5 MILLIGRAM(S): at 00:18

## 2022-11-21 RX ADMIN — Medication 103.5 MILLIGRAM(S): at 16:20

## 2022-11-21 RX ADMIN — SODIUM CHLORIDE 3 MILLILITER(S): 9 INJECTION INTRAMUSCULAR; INTRAVENOUS; SUBCUTANEOUS at 13:10

## 2022-11-21 RX ADMIN — CEFTRIAXONE 100 MILLIGRAM(S): 500 INJECTION, POWDER, FOR SOLUTION INTRAMUSCULAR; INTRAVENOUS at 21:35

## 2022-11-21 RX ADMIN — Medication 103.5 MILLIGRAM(S): at 21:34

## 2022-11-21 RX ADMIN — CEFTRIAXONE 100 MILLIGRAM(S): 500 INJECTION, POWDER, FOR SOLUTION INTRAMUSCULAR; INTRAVENOUS at 10:21

## 2022-11-21 RX ADMIN — SODIUM CHLORIDE 3 MILLILITER(S): 9 INJECTION INTRAMUSCULAR; INTRAVENOUS; SUBCUTANEOUS at 21:35

## 2022-11-21 RX ADMIN — Medication 103.5 MILLIGRAM(S): at 07:23

## 2022-11-21 RX ADMIN — PANTOPRAZOLE SODIUM 40 MILLIGRAM(S): 20 TABLET, DELAYED RELEASE ORAL at 05:50

## 2022-11-21 NOTE — PROGRESS NOTE ADULT - ASSESSMENT
31 yo F w PMH of congenital VSD (uncorrected) presents to [facility] CTS for 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 Initially patient presented to the [facility] ED w chronic cough, SOB, hematemesis & hemoptysis. Pt complained of  chronic cough for the past 2 months, initially non-productive, now slightly productive associated with SOB. Initially she was able to climb one flight of stairs, but the SOB & cough have worsened over the past 3 weeks, now claims that she would be short of breath if she walked 5ft She was seen by her PCP & pulmonologist for the cough & sob and was advised to use inhalers and azithromycin with no relief. Wednesda night 11/9 into Thursday AM 11/10 her cough worsened and also had an episode of vomiting, found 10-15 ml of blood mixed with the vomitus. Her cough episode later showed sputum w blood streaks. Claims that this was new, and never happened in the past 2 months. Patient was admited to [facility]. Patient was found to be in Acute HF and an echo was done showing Severe mitral valve regurgitation and . Patient was then transferred to [facility] to 11/14 under Dr. Denson for Evaluation of MV regurgitation/endocarditis and VSD. Patient currently admits to coughing that has only been helped by Lasix that was being administered at [facility].  most Recent travel in may to [facility] currently denies Chest pain, fevers, chills, diarrhea, headaches, urinary or bowel changes.          11/21 PREOP for AM 11/22.  Continue ceftriaxone / Gent on call to OR Tuesday. NPO after MN. COVID pcr sent this AM.  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 Initially patient presented to the [facility] ED w chronic cough, SOB, hematemesis & hemoptysis. Pt complained of  chronic cough for the past 2 months, initially non-productive, now slightly productive associated with SOB. Initially she was able to climb one flight of stairs, but the SOB & cough have worsened over the past 3 weeks, now claims that she would be short of breath if she walked 5ft She was seen by her PCP & pulmonologist for the cough & sob and was advised to use inhalers and azithromycin with no relief. Wednesda night 11/9 into Thursday AM 11/10 her cough worsened and also had an episode of vomiting, found 10-15 ml of blood mixed with the vomitus. Her cough episode later showed sputum w blood streaks. Claims that this was new, and never happened in the past 2 months. Patient was admited to [facility]. Patient was found to be in Acute HF and an echo was done showing Severe mitral valve regurgitation and . Patient was then transferred to [facility] to 11/14 under Dr. Denson for Evaluation of MV regurgitation/endocarditis and VSD. Patient currently admits to coughing that has only been helped by Lasix that was being administered at [facility].  most Recent travel in may to [facility] currently denies Chest pain, fevers, chills, diarrhea, headaches, urinary or bowel changes.          11/21 PREOP for AM 11/22.  Continue ceftriaxone / Gent ( last dose tonight per ID) / + Vanc x1 on call to OR Tuesday. NPO after MN. COVID pcr sent this AM /neg.

## 2022-11-21 NOTE — PROGRESS NOTE ADULT - SUBJECTIVE AND OBJECTIVE BOX
Cardiac Surgery Pre-op Note:  CC: Patient is a 32y old  Female who presents with a chief complaint of MV Endocarditis/ VSD (20 Nov 2022 13:35)                                                                                           Surgeon: Dr Doll  Procedure: (Date) (Procedure)  MVR / VSD Repair 11/22/22  Allergies    Allergy Status Unknown    Intolerances      per HPI:  31 yo F w PMH of congenital VSD (uncorrected) presents to [facility] CTS for 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 Initially patient presented to the [facility] ED w chronic cough, SOB, hematemesis & hemoptysis. Pt complained of  chronic cough for the past 2 months, initially non-productive, now slightly productive associated with SOB. Initially she was able to climb one flight of stairs, but the SOB & cough have worsened over the past 3 weeks, now claims that she would be short of breath if she walked 5ft She was seen by her PCP & pulmonologist for the cough & sob and was advised to use inhalers and azithromycin with no relief. Wednesda night 11/9 into Thursday AM 11/10 her cough worsened and also had an episode of vomiting, found 10-15 ml of blood mixed with the vomitus. Her cough episode later showed sputum w blood streaks. Claims that this was new, and never happened in the past 2 months. Patient was admited to [facility]. Patient was found to be in Acute HF and an echo was done showing Severe mitral valve regurgitation and . Patient was then transferred to [facility] to 11/14 under Dr. Denson for Evaluation of MV regurgitation/endocarditis and VSD. Patient currently admits to coughing that has only been helped by Lasix that was being administered at [facility].  most Recent travel in may to [facility] currently denies Chest pain, fevers, chills, diarrhea, headaches, urinary or bowel changes.        PAST MEDICAL & SURGICAL HISTORY:  Murmur      VSD (ventricular septal defect)          MEDICATIONS  (STANDING):  cefTRIAXone   IVPB 2000 milliGRAM(s) IV Intermittent every 12 hours  coronavirus bivalent (EUA) Booster Vaccine (PFIZER) 0.3 milliLiter(s) IntraMuscular once  enoxaparin Injectable 40 milliGRAM(s) SubCutaneous every 24 hours  gentamicin   IVPB 70 milliGRAM(s) IV Intermittent every 8 hours  metoprolol tartrate 12.5 milliGRAM(s) Oral every 12 hours  pantoprazole    Tablet 40 milliGRAM(s) Oral before breakfast  sodium chloride 0.9% lock flush 3 milliLiter(s) IV Push every 8 hours    MEDICATIONS  (PRN):  acetaminophen     Tablet .. 650 milliGRAM(s) Oral every 6 hours PRN Mild Pain (1 - 3)      Labs:                        9.0    12.98 )-----------( 536      ( 21 Nov 2022 05:53 )             28.5     11-21    138  |  102  |  10  ----------------------------<  89  4.6   |  23  |  0.89    Ca    9.5      21 Nov 2022 05:58    TPro  7.1  /  Alb  3.4  /  TBili  0.3  /  DBili  x   /  AST  54<H>  /  ALT  89<H>  /  AlkPhos  115  11-20        Blood Type: ABO Interpretation: B    HGB A1C: 5.5             Carotid Duplex:    < from: VA Duplex Carotid, Bilat (11.15.22 @ 11:10) >  IMPRESSION: No significant hemodynamic stenosis of either carotid artery.    Measurement of carotid stenosis is based on velocity parameters that   correlate the residual internal carotid diameter with that of the more   distal vessel in accordance with a method suchas the North American   Symptomatic Carotid Endarterectomy Trial (NASCET).    --- End of Report ---    < end of copied text >        Echocardiogram:  < from: Transthoracic Echocardiogram (11.19.22 @ 15:36) >  Conclusions:  1. Echodensity suspicious for a vegetation measuring  approximately 1.8cm x 0.5cm is seen on the atrial side of  tip of the anterior mitral leaflet. Linear, mobile  echodensity measuring approximately 1.5 cm in length seen  on the atrial side of the posterior mitral annulus could  represent vegetation vs. flail portion of the posterior  leaflet.  There is malcoaptation of the leaflets. Severe  ("torrential") mitral regurgitation. Systolic flow reversal  seen in a left-sided pulmonary vein.  2. Mild left ventricular enlargement.  3. Hyperdynamic left ventricular systolic function.  4. Normal right ventricular size and function.  5. Estimated right ventricular systolic pressure equals 60  mm Hg, assuming right atrial pressure equals 10 mm Hg,  consistent with moderate pulmonary hypertension.  6. Normal tricuspid valve. Mild-moderate tricuspid  regurgitation.  Discussed with [facility] NP  [id number]  ------------------------------------------------------------------------  Confirmed on  11/19/2022 - 19:38:44 by Farhana Paniagua M.D.FASALICIA  ------------------------------------------------------------------------    < end of copied text >        Cardiac catheterization:        Gen: WN/WD NAD  Neuro: AAOx3, nonfocal  Pulm: CTA B/L  CV: RRR, S1S2 + murmur  Abd: Soft, NT, ND +BS  Ext: No edema, + peripheral pulses      Pt has AICD/PPM [ ] Yes  [x ] No             Brand Name:  Pre-op Beta Blocker ordered within 24 hrs of surgery (CABG ONLY)?  [x ] Yes  [ ] No  If not, Why?  Type & Cross  [ x] Yes  [ ] No  NPO after Midnight [x ] Yes  [ ] No  Pre-op ABX ordered, to be taped on chart:  [ x] Yes  [ ] No     Hibiclens/Peridex ordered [x ] Yes  [ ] No  Intraop on Hold: PRBCs, CXR, TAVO [x ]   Consent obtained  [ ] Yes  [ ] No  COVID pcr sent this AM 11/21/22  HCG serum negative 11/21 Vital Signs Last 24 Hrs  T(C): 36.3 (28 Aug 2019 07:38), Max: 36.3 (28 Aug 2019 07:38)  T(F): 97.4 (28 Aug 2019 07:38), Max: 97.4 (28 Aug 2019 07:38)  HR: 116 (28 Aug 2019 07:38) (104 - 116)  BP: 135/80 (28 Aug 2019 07:38) (135/80 - 135/88)  BP(mean): --  RR: 18 (28 Aug 2019 07:38) (18 - 19)  SpO2: 94% (28 Aug 2019 07:38) (94% - 98%) Vital Signs Last 24 Hrs  T(C): 37 (27 Aug 2019 07:38), Max: 37 (27 Aug 2019 07:38)  T(F): 98.6 (27 Aug 2019 07:38), Max: 98.6 (27 Aug 2019 07:38)  HR: 104 (27 Aug 2019 16:16) (98 - 104)  BP: 135/88 (27 Aug 2019 16:16) (121/79 - 135/88)  BP(mean): --  RR: 19 (27 Aug 2019 16:16) (19 - 19)  SpO2: 98% (27 Aug 2019 16:16) (98% - 98%) Vital Signs Last 24 Hrs  T(C): 36.7 (26 Aug 2019 08:38), Max: 36.7 (26 Aug 2019 08:38)  T(F): 98.1 (26 Aug 2019 08:38), Max: 98.1 (26 Aug 2019 08:38)  HR: 92 (26 Aug 2019 16:26) (88 - 92)  BP: 115/76 (26 Aug 2019 16:26) (107/75 - 115/76)  BP(mean): --  RR: 16 (26 Aug 2019 16:26) (16 - 18)  SpO2: 98% (26 Aug 2019 16:26) (97% - 98%) Vital Signs Last 24 Hrs  T(C): 36.7 (29 Aug 2019 08:31), Max: 36.7 (29 Aug 2019 08:31)  T(F): 98.1 (29 Aug 2019 08:31), Max: 98.1 (29 Aug 2019 08:31)  HR: 93 (29 Aug 2019 08:31) (93 - 98)  BP: 141/81 (29 Aug 2019 08:31) (137/91 - 141/81)  BP(mean): --  RR: 16 (29 Aug 2019 08:31) (16 - 17)  SpO2: 98% (29 Aug 2019 08:31) (98% - 98%)

## 2022-11-21 NOTE — PROGRESS NOTE ADULT - ASSESSMENT
32 year old with history of unrepaired VSD presents with sob, cough , hemoptysis , fever, fatigue to [facility]. 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 She has no dental problems and no recent dental work except for replacement of a cap months ago   Denies HA, change i mental status  Feels better since ab started but having very bad burning fro m the pcn infusion.   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 VSD/MR    She has severe MR on a TAVO  no abscess noted  Follow up BC are negative    ceftriaxone and genta for endocarditis with S gemelli  MRA  negative   for repair or replacement of MV tomorrow  tolerating ceftriaxone better than pcn and feels much better    check genta trough  is  level low which is good  we want it under .5  it is being used for synergy  no symptoms of genta toxicity and no baseline hearing or vestibular issues  I think we have to us combination therapy for this organism  for duration of therapy  Given increasing and mobile nature of vegetation favor surgical repair in the very near term      nataliia hold gent tomorrow and give vanco /ceftriaxone tomorrow and then can switch back to rocephin / gent  Rocephin ( Ceftriaxone ) dose should be 2 grams IVSS daily    Maki Lee M.D. ,   please reach via teams   If no answer, or after 5PM/ weekends,  then please call  [phone]      Assessment and plan discussed with the primary team .

## 2022-11-21 NOTE — PROGRESS NOTE ADULT - SUBJECTIVE AND OBJECTIVE BOX
Patient is a 32y old  Female who presents with a chief complaint of MV Endocarditis/ VSD (21 Nov 2022 11:05)    Being followed by ID for        Interval history:  No other acute events      ROS:  No cough,SOB,CP  No N/V/D  No abd pain  No urinary complaints  No HA  No joint or limb pain  No other complaints    PAST MEDICAL & SURGICAL HISTORY:  Murmur      VSD (ventricular septal defect)        Allergies    Allergy Status Unknown    Intolerances      Antimicrobials:    cefTRIAXone   IVPB 2000 milliGRAM(s) IV Intermittent every 12 hours  cefuroxime  IVPB 1500 milliGRAM(s) IV Intermittent once  gentamicin   IVPB 70 milliGRAM(s) IV Intermittent every 8 hours    MEDICATIONS  (STANDING):  cefTRIAXone   IVPB 2000 milliGRAM(s) IV Intermittent every 12 hours  cefuroxime  IVPB 1500 milliGRAM(s) IV Intermittent once  chlorhexidine 0.12% Liquid 30 milliLiter(s) Swish and Spit once  chlorhexidine 4% Liquid 1 Application(s) Topical once  coronavirus bivalent (EUA) Booster Vaccine (PFIZER) 0.3 milliLiter(s) IntraMuscular once  enoxaparin Injectable 40 milliGRAM(s) SubCutaneous every 24 hours  gentamicin   IVPB 70 milliGRAM(s) IV Intermittent every 8 hours  metoprolol tartrate 12.5 milliGRAM(s) Oral every 12 hours  pantoprazole    Tablet 40 milliGRAM(s) Oral before breakfast  sodium chloride 0.9% lock flush 3 milliLiter(s) IV Push every 8 hours      Vital Signs Last 24 Hrs  T(C): 36.7 (11-21-22 @ 10:24), Max: 37.2 (11-20-22 @ 19:56)  T(F): 98 (11-21-22 @ 10:24), Max: 99 (11-20-22 @ 19:56)  HR: 102 (11-21-22 @ 10:24) (91 - 104)  BP: 95/65 (11-21-22 @ 10:24) (95/65 - 106/73)  BP(mean): 77 (11-21-22 @ 04:38) (77 - 81)  RR: 18 (11-21-22 @ 10:24) (18 - 18)  SpO2: 95% (11-21-22 @ 10:24) (95% - 100%)    Physical Exam:    Constitutional well preserved,comfortable,pleasant    HEENT PERRLA EOMI,No pallor or icterus    No oral exudate or erythema    Neck supple no JVD or LN    Chest Good AE,CTA    CVS RRR S1 S2 WNl No murmur or rub or gallop    Abd soft BS normal No tenderness no masses    Ext No cyanosis clubbing or edema    IV site no erythema tenderness or discharge    Joints no swelling or LOM    CNS AAO X 3 no focal    Lab Data:                          9.0    12.98 )-----------( 536      ( 21 Nov 2022 05:53 )             28.5       11-21    138  |  102  |  10  ----------------------------<  89  4.6   |  23  |  0.89    Ca    9.5      21 Nov 2022 05:58    TPro  7.1  /  Alb  3.4  /  TBili  0.3  /  DBili  x   /  AST  54<H>  /  ALT  89<H>  /  AlkPhos  115  11-20          .Blood Blood-Peripheral  11-18-22   No growth to date.  --  --      .Blood Blood-Peripheral  11-18-22   No growth to date.  --  --      .Blood Blood  11-15-22   Growth in anaerobic bottle: Gram Positive Cocci in Pairs and Chains Most  closely resembling Gemella species  "Susceptibilities not performed"  --    Growth in anaerobic bottle: Gram positive cocci in pairs                    WBC Count: 12.98 (11-21-22 @ 05:53)  WBC Count: 11.53 (11-20-22 @ 07:13)  WBC Count: 13.90 (11-18-22 @ 07:44)  WBC Count: 14.24 (11-17-22 @ 03:45)  WBC Count: 14.38 (11-16-22 @ 05:49)  WBC Count: 17.04 (11-15-22 @ 00:54)             Patient is a 32y old  Female who presents with a chief complaint of MV Endocarditis/ VSD (21 Nov 2022 11:05)    Being followed by ID for endocarditis        Interval history:  pt scheduled for surgery tomorrow  breathing stable  no fevers  No other acute events          PAST MEDICAL & SURGICAL HISTORY:  Murmur      VSD (ventricular septal defect)        Allergies    Allergy Status Unknown    Intolerances      Antimicrobials:    cefTRIAXone   IVPB 2000 milliGRAM(s) IV Intermittent every 12 hours  cefuroxime  IVPB 1500 milliGRAM(s) IV Intermittent once  gentamicin   IVPB 70 milliGRAM(s) IV Intermittent every 8 hours    MEDICATIONS  (STANDING):  cefTRIAXone   IVPB 2000 milliGRAM(s) IV Intermittent every 12 hours  cefuroxime  IVPB 1500 milliGRAM(s) IV Intermittent once  chlorhexidine 0.12% Liquid 30 milliLiter(s) Swish and Spit once  chlorhexidine 4% Liquid 1 Application(s) Topical once  coronavirus bivalent (EUA) Booster Vaccine (PFIZER) 0.3 milliLiter(s) IntraMuscular once  enoxaparin Injectable 40 milliGRAM(s) SubCutaneous every 24 hours  gentamicin   IVPB 70 milliGRAM(s) IV Intermittent every 8 hours  metoprolol tartrate 12.5 milliGRAM(s) Oral every 12 hours  pantoprazole    Tablet 40 milliGRAM(s) Oral before breakfast  sodium chloride 0.9% lock flush 3 milliLiter(s) IV Push every 8 hours      Vital Signs Last 24 Hrs  T(C): 36.7 (11-21-22 @ 10:24), Max: 37.2 (11-20-22 @ 19:56)  T(F): 98 (11-21-22 @ 10:24), Max: 99 (11-20-22 @ 19:56)  HR: 102 (11-21-22 @ 10:24) (91 - 104)  BP: 95/65 (11-21-22 @ 10:24) (95/65 - 106/73)  BP(mean): 77 (11-21-22 @ 04:38) (77 - 81)  RR: 18 (11-21-22 @ 10:24) (18 - 18)  SpO2: 95% (11-21-22 @ 10:24) (95% - 100%)    Physical Exam:    Constitutional well preserved,comfortable,pleasant    HEENT PERRLA EOMI,No pallor or icterus    No oral exudate or erythema    Neck supple no JVD or LN    Chest Good AE,CTA    CVS  S1 S2 murmur    Abd soft BS normal No tenderness     Ext No cyanosis clubbing or edema    IV site no erythema tenderness or discharge    Joints no swelling or LOM    CNS AAO X 3 no focal    Lab Data:                          9.0    12.98 )-----------( 536      ( 21 Nov 2022 05:53 )             28.5       11-21    138  |  102  |  10  ----------------------------<  89  4.6   |  23  |  0.89    Ca    9.5      21 Nov 2022 05:58    TPro  7.1  /  Alb  3.4  /  TBili  0.3  /  DBili  x   /  AST  54<H>  /  ALT  89<H>  /  AlkPhos  115  11-20      MRSA/MSSA PCR (11.15.22 @ 00:56)    MRSA PCR Result.: NotDetec: The results of this test should be interpreted with consideration of  clinical context.  Not Detected result indicates the absence of organisms or that the number  of organisms is below the assay limit of detection.  Detected result indicates the presence of organism nucleic acid.  Indeterminate result may indicate the presence of amplification  inhibitors in specimen; presence or absence of organisms cannot be  determined. Consider collecting new specimen if further testing is still  needed.  This qualitative PCR assay is FDA-approved, and its performance was  established by [facility] [employer], [place], [place].    Staph aureus PCR Result: NotDetec        .Blood Blood-Peripheral  11-18-22   No growth to date.  --  --      .Blood Blood-Peripheral  11-18-22   No growth to date.  --  --      .Blood Blood  11-15-22   Growth in anaerobic bottle: Gram Positive Cocci in Pairs and Chains Most  closely resembling Gemella species  "Susceptibilities not performed"  --    Growth in anaerobic bottle: Gram positive cocci in pairs      < from: Transthoracic Echocardiogram (11.19.22 @ 15:36) >  Conclusions:  1. Echodensity suspicious for a vegetation measuring  approximately 1.8cm x 0.5cm is seen on the atrial side of  tip of the anterior mitral leaflet. Linear, mobile  echodensity measuring approximately 1.5 cm in length seen  on the atrial side of the posterior mitral annulus could  represent vegetation vs. flail portion of the posterior  leaflet.  There is malcoaptation of the leaflets. Severe  ("torrential") mitral regurgitation. Systolic flow reversal  seen in a left-sided pulmonary vein.  2. Mild left ventricular enlargement.  3. Hyperdynamic left ventricular systolic function.  4. Normal right ventricular size and function.  5. Estimated right ventricular systolic pressure equals 60  mm Hg, assuming right atrial pressure equals 10 mm Hg,  consistent with moderate pulmonary hypertension.  6. Normal tricuspid valve. Mild-moderate tricuspid  regurgitation.  Discussed with CTS NP [id number]  ------------------------------------------------------------------------  Confirmed on  11/19/2022 - 19:38:44 by XENA Booth  ------------------------------------------------------------------------    < end of copied text >        Gentamicin Level, Trough - Prior to Next Dose (11.17.22 @ 03:45)    Gentamicin Level, Trough: <0.6: The Trough Reference Range for Once-Daily High-Dose Therapy is  Undetectable (reported as < lowest detectable amount). ug/mL            WBC Count: 12.98 (11-21-22 @ 05:53)  WBC Count: 11.53 (11-20-22 @ 07:13)  WBC Count: 13.90 (11-18-22 @ 07:44)  WBC Count: 14.24 (11-17-22 @ 03:45)  WBC Count: 14.38 (11-16-22 @ 05:49)  WBC Count: 17.04 (11-15-22 @ 00:54)

## 2022-11-22 ENCOUNTER — RESULT REVIEW (OUTPATIENT)
Age: 32
End: 2022-11-22

## 2022-11-22 LAB
ALBUMIN SERPL ELPH-MCNC: 3.5 G/DL — SIGNIFICANT CHANGE UP (ref 3.3–5)
ALP SERPL-CCNC: 86 U/L — SIGNIFICANT CHANGE UP (ref 40–120)
ALT FLD-CCNC: 60 U/L — HIGH (ref 10–45)
ANION GAP SERPL CALC-SCNC: 17 MMOL/L — SIGNIFICANT CHANGE UP (ref 5–17)
APTT BLD: 28.2 SEC — SIGNIFICANT CHANGE UP (ref 27.5–35.5)
AST SERPL-CCNC: 82 U/L — HIGH (ref 10–40)
BASE EXCESS BLDV CALC-SCNC: -0.3 MMOL/L — SIGNIFICANT CHANGE UP (ref -2–3)
BASE EXCESS BLDV CALC-SCNC: -1 MMOL/L — SIGNIFICANT CHANGE UP (ref -2–3)
BASE EXCESS BLDV CALC-SCNC: 2.6 MMOL/L — SIGNIFICANT CHANGE UP (ref -2–3)
BASE EXCESS BLDV CALC-SCNC: 4.8 MMOL/L — HIGH (ref -2–3)
BASOPHILS # BLD AUTO: 0.05 K/UL — SIGNIFICANT CHANGE UP (ref 0–0.2)
BASOPHILS NFR BLD AUTO: 0.2 % — SIGNIFICANT CHANGE UP (ref 0–2)
BILIRUB SERPL-MCNC: 1.1 MG/DL — SIGNIFICANT CHANGE UP (ref 0.2–1.2)
BLOOD GAS VENOUS - CREATININE: SIGNIFICANT CHANGE UP MG/DL (ref 0.5–1.3)
BUN SERPL-MCNC: 11 MG/DL — SIGNIFICANT CHANGE UP (ref 7–23)
CA-I SERPL-SCNC: 0.9 MMOL/L — LOW (ref 1.15–1.33)
CA-I SERPL-SCNC: 0.94 MMOL/L — LOW (ref 1.15–1.33)
CA-I SERPL-SCNC: 0.98 MMOL/L — LOW (ref 1.15–1.33)
CA-I SERPL-SCNC: 0.99 MMOL/L — LOW (ref 1.15–1.33)
CALCIUM SERPL-MCNC: 8.8 MG/DL — SIGNIFICANT CHANGE UP (ref 8.4–10.5)
CHLORIDE BLDV-SCNC: 102 MMOL/L — SIGNIFICANT CHANGE UP (ref 96–108)
CHLORIDE BLDV-SCNC: 103 MMOL/L — SIGNIFICANT CHANGE UP (ref 96–108)
CHLORIDE BLDV-SCNC: 103 MMOL/L — SIGNIFICANT CHANGE UP (ref 96–108)
CHLORIDE BLDV-SCNC: 104 MMOL/L — SIGNIFICANT CHANGE UP (ref 96–108)
CHLORIDE SERPL-SCNC: 104 MMOL/L — SIGNIFICANT CHANGE UP (ref 96–108)
CK MB BLD-MCNC: 11.6 % — HIGH (ref 0–3.5)
CK MB CFR SERPL CALC: 88.1 NG/ML — HIGH (ref 0–3.8)
CK SERPL-CCNC: 757 U/L — HIGH (ref 25–170)
CO2 BLDV-SCNC: 24 MMOL/L — SIGNIFICANT CHANGE UP (ref 22–26)
CO2 BLDV-SCNC: 28 MMOL/L — HIGH (ref 22–26)
CO2 BLDV-SCNC: 30 MMOL/L — HIGH (ref 22–26)
CO2 BLDV-SCNC: 31 MMOL/L — HIGH (ref 22–26)
CO2 SERPL-SCNC: 23 MMOL/L — SIGNIFICANT CHANGE UP (ref 22–31)
CREAT SERPL-MCNC: 0.93 MG/DL — SIGNIFICANT CHANGE UP (ref 0.5–1.3)
EGFR: 83 ML/MIN/1.73M2 — SIGNIFICANT CHANGE UP
EOSINOPHIL # BLD AUTO: 0.22 K/UL — SIGNIFICANT CHANGE UP (ref 0–0.5)
EOSINOPHIL NFR BLD AUTO: 1 % — SIGNIFICANT CHANGE UP (ref 0–6)
FIBRINOGEN PPP-MCNC: 355 MG/DL — SIGNIFICANT CHANGE UP (ref 200–445)
GAS PNL BLDA: SIGNIFICANT CHANGE UP
GAS PNL BLDV: 135 MMOL/L — LOW (ref 136–145)
GAS PNL BLDV: 137 MMOL/L — SIGNIFICANT CHANGE UP (ref 136–145)
GAS PNL BLDV: 139 MMOL/L — SIGNIFICANT CHANGE UP (ref 136–145)
GAS PNL BLDV: 140 MMOL/L — SIGNIFICANT CHANGE UP (ref 136–145)
GAS PNL BLDV: SIGNIFICANT CHANGE UP
GLUCOSE BLDC GLUCOMTR-MCNC: 140 MG/DL — HIGH (ref 70–99)
GLUCOSE BLDC GLUCOMTR-MCNC: 146 MG/DL — HIGH (ref 70–99)
GLUCOSE BLDC GLUCOMTR-MCNC: 150 MG/DL — HIGH (ref 70–99)
GLUCOSE BLDC GLUCOMTR-MCNC: 152 MG/DL — HIGH (ref 70–99)
GLUCOSE BLDC GLUCOMTR-MCNC: 162 MG/DL — HIGH (ref 70–99)
GLUCOSE BLDC GLUCOMTR-MCNC: 178 MG/DL — HIGH (ref 70–99)
GLUCOSE BLDV-MCNC: 122 MG/DL — HIGH (ref 70–99)
GLUCOSE BLDV-MCNC: 130 MG/DL — HIGH (ref 70–99)
GLUCOSE BLDV-MCNC: 142 MG/DL — HIGH (ref 70–99)
GLUCOSE BLDV-MCNC: 99 MG/DL — SIGNIFICANT CHANGE UP (ref 70–99)
GLUCOSE SERPL-MCNC: 99 MG/DL — SIGNIFICANT CHANGE UP (ref 70–99)
HCO3 BLDV-SCNC: 23 MMOL/L — SIGNIFICANT CHANGE UP (ref 22–29)
HCO3 BLDV-SCNC: 27 MMOL/L — SIGNIFICANT CHANGE UP (ref 22–29)
HCO3 BLDV-SCNC: 28 MMOL/L — SIGNIFICANT CHANGE UP (ref 22–29)
HCO3 BLDV-SCNC: 30 MMOL/L — HIGH (ref 22–29)
HCT VFR BLD CALC: 31.6 % — LOW (ref 34.5–45)
HCT VFR BLDA CALC: 18 % — CRITICAL LOW (ref 34.5–46.5)
HCT VFR BLDA CALC: 23 % — LOW (ref 34.5–46.5)
HCT VFR BLDA CALC: 24 % — LOW (ref 34.5–46.5)
HCT VFR BLDA CALC: 25 % — LOW (ref 34.5–46.5)
HEPARINASE TEG R TIME: 5.5 MIN — SIGNIFICANT CHANGE UP (ref 4.3–8.3)
HEPARINASE TEG R TIME: 6.4 MIN — SIGNIFICANT CHANGE UP (ref 4.3–8.3)
HGB BLD CALC-MCNC: 6 G/DL — CRITICAL LOW (ref 11.7–16.1)
HGB BLD CALC-MCNC: 7.7 G/DL — LOW (ref 11.7–16.1)
HGB BLD CALC-MCNC: 8 G/DL — LOW (ref 11.7–16.1)
HGB BLD CALC-MCNC: 8.2 G/DL — LOW (ref 11.7–16.1)
HGB BLD-MCNC: 10.5 G/DL — LOW (ref 11.5–15.5)
IMM GRANULOCYTES NFR BLD AUTO: 0.7 % — SIGNIFICANT CHANGE UP (ref 0–0.9)
INR BLD: 1.26 RATIO — HIGH (ref 0.88–1.16)
LACTATE BLDV-MCNC: 0.8 MMOL/L — SIGNIFICANT CHANGE UP (ref 0.5–2)
LACTATE BLDV-MCNC: 1.1 MMOL/L — SIGNIFICANT CHANGE UP (ref 0.5–2)
LACTATE BLDV-MCNC: 1.3 MMOL/L — SIGNIFICANT CHANGE UP (ref 0.5–2)
LACTATE BLDV-MCNC: 1.5 MMOL/L — SIGNIFICANT CHANGE UP (ref 0.5–2)
LYMPHOCYTES # BLD AUTO: 2.1 K/UL — SIGNIFICANT CHANGE UP (ref 1–3.3)
LYMPHOCYTES # BLD AUTO: 9.4 % — LOW (ref 13–44)
MAGNESIUM SERPL-MCNC: 3.5 MG/DL — HIGH (ref 1.6–2.6)
MCHC RBC-ENTMCNC: 27.1 PG — SIGNIFICANT CHANGE UP (ref 27–34)
MCHC RBC-ENTMCNC: 33.2 GM/DL — SIGNIFICANT CHANGE UP (ref 32–36)
MCV RBC AUTO: 81.4 FL — SIGNIFICANT CHANGE UP (ref 80–100)
MONOCYTES # BLD AUTO: 0.91 K/UL — HIGH (ref 0–0.9)
MONOCYTES NFR BLD AUTO: 4.1 % — SIGNIFICANT CHANGE UP (ref 2–14)
NEUTROPHILS # BLD AUTO: 18.89 K/UL — HIGH (ref 1.8–7.4)
NEUTROPHILS NFR BLD AUTO: 84.6 % — HIGH (ref 43–77)
NRBC # BLD: 0 /100 WBCS — SIGNIFICANT CHANGE UP (ref 0–0)
PCO2 BLDV: 35 MMHG — LOW (ref 39–42)
PCO2 BLDV: 46 MMHG — HIGH (ref 39–42)
PCO2 BLDV: 49 MMHG — HIGH (ref 39–42)
PCO2 BLDV: 57 MMHG — HIGH (ref 39–42)
PH BLDV: 7.28 — LOW (ref 7.32–7.43)
PH BLDV: 7.37 — SIGNIFICANT CHANGE UP (ref 7.32–7.43)
PH BLDV: 7.42 — SIGNIFICANT CHANGE UP (ref 7.32–7.43)
PH BLDV: 7.43 — SIGNIFICANT CHANGE UP (ref 7.32–7.43)
PHOSPHATE SERPL-MCNC: 5.4 MG/DL — HIGH (ref 2.5–4.5)
PLATELET # BLD AUTO: 313 K/UL — SIGNIFICANT CHANGE UP (ref 150–400)
PO2 BLDV: 113 MMHG — HIGH (ref 25–45)
PO2 BLDV: 60 MMHG — HIGH (ref 25–45)
PO2 BLDV: 72 MMHG — HIGH (ref 25–45)
PO2 BLDV: 87 MMHG — HIGH (ref 25–45)
POTASSIUM BLDV-SCNC: 4.3 MMOL/L — SIGNIFICANT CHANGE UP (ref 3.5–5.1)
POTASSIUM BLDV-SCNC: 4.7 MMOL/L — SIGNIFICANT CHANGE UP (ref 3.5–5.1)
POTASSIUM BLDV-SCNC: 5.5 MMOL/L — HIGH (ref 3.5–5.1)
POTASSIUM BLDV-SCNC: 5.8 MMOL/L — HIGH (ref 3.5–5.1)
POTASSIUM SERPL-MCNC: 4.3 MMOL/L — SIGNIFICANT CHANGE UP (ref 3.5–5.3)
POTASSIUM SERPL-SCNC: 4.3 MMOL/L — SIGNIFICANT CHANGE UP (ref 3.5–5.3)
PROT SERPL-MCNC: 6.3 G/DL — SIGNIFICANT CHANGE UP (ref 6–8.3)
PROTHROM AB SERPL-ACNC: 14.5 SEC — HIGH (ref 10.5–13.4)
RAPIDTEG MAXIMUM AMPLITUDE: 68.1 MM — SIGNIFICANT CHANGE UP (ref 52–70)
RAPIDTEG MAXIMUM AMPLITUDE: 73.8 MM (ref 52–70)
RBC # BLD: 3.88 M/UL — SIGNIFICANT CHANGE UP (ref 3.8–5.2)
RBC # FLD: 14.6 % — HIGH (ref 10.3–14.5)
SAO2 % BLDV: 92.6 % — HIGH (ref 67–88)
SAO2 % BLDV: 94.5 % — HIGH (ref 67–88)
SAO2 % BLDV: 98.8 % — HIGH (ref 67–88)
SAO2 % BLDV: 98.9 % — HIGH (ref 67–88)
SODIUM SERPL-SCNC: 144 MMOL/L — SIGNIFICANT CHANGE UP (ref 135–145)
TEG FUNCTIONAL FIBRINOGEN: 32.2 MM (ref 15–32)
TEG FUNCTIONAL FIBRINOGEN: 50.1 MM (ref 15–32)
TEG MAXIMUM AMPLITUDE: 69.8 MM (ref 52–69)
TEG MAXIMUM AMPLITUDE: 71.4 MM (ref 52–69)
TEG REACTION TIME: 8 MIN — SIGNIFICANT CHANGE UP (ref 4.6–9.1)
TEG REACTION TIME: 8.9 MIN — SIGNIFICANT CHANGE UP (ref 4.6–9.1)
TROPONIN T, HIGH SENSITIVITY RESULT: 1243 NG/L — HIGH (ref 0–51)
WBC # BLD: 22.32 K/UL — HIGH (ref 3.8–10.5)
WBC # FLD AUTO: 22.32 K/UL — HIGH (ref 3.8–10.5)

## 2022-11-22 PROCEDURE — 71045 X-RAY EXAM CHEST 1 VIEW: CPT | Mod: 26

## 2022-11-22 PROCEDURE — 93010 ELECTROCARDIOGRAM REPORT: CPT

## 2022-11-22 PROCEDURE — 88305 TISSUE EXAM BY PATHOLOGIST: CPT | Mod: 26

## 2022-11-22 PROCEDURE — 33460 REVISION OF TRICUSPID VALVE: CPT

## 2022-11-22 PROCEDURE — 33681 CLOSURE 1 VSD W/WO PATCH: CPT

## 2022-11-22 PROCEDURE — 94010 BREATHING CAPACITY TEST: CPT | Mod: 26

## 2022-11-22 PROCEDURE — 99291 CRITICAL CARE FIRST HOUR: CPT

## 2022-11-22 PROCEDURE — 99232 SBSQ HOSP IP/OBS MODERATE 35: CPT

## 2022-11-22 PROCEDURE — 88312 SPECIAL STAINS GROUP 1: CPT | Mod: 26

## 2022-11-22 PROCEDURE — 33430 REPLACEMENT OF MITRAL VALVE: CPT

## 2022-11-22 DEVICE — CATH VENT VENTRICULAR PVC 18FR X 4.25" TIP PERFORATION: Type: IMPLANTABLE DEVICE | Status: FUNCTIONAL

## 2022-11-22 DEVICE — CANNULA ARTERIAL OPTISITE 20FR X 3/8" VENTED: Type: IMPLANTABLE DEVICE | Status: FUNCTIONAL

## 2022-11-22 DEVICE — VALVE MITRAL EPIC PLUS 31MM: Type: IMPLANTABLE DEVICE | Status: FUNCTIONAL

## 2022-11-22 DEVICE — KIT CVC 2LUM MAC 9FR CHG: Type: IMPLANTABLE DEVICE | Status: FUNCTIONAL

## 2022-11-22 DEVICE — LIGATING CLIPS WECK HORIZON SMALL-WIDE (RED) 24: Type: IMPLANTABLE DEVICE | Status: FUNCTIONAL

## 2022-11-22 DEVICE — CANNULA AORTIC ROOT 14G X 14CM FLANGED: Type: IMPLANTABLE DEVICE | Status: FUNCTIONAL

## 2022-11-22 DEVICE — SURGICEL 2 X 14": Type: IMPLANTABLE DEVICE | Status: FUNCTIONAL

## 2022-11-22 DEVICE — CANNULA VENOUS 1 STAGE RIGHT ANGLE METAL TIP 28FR X 3/8": Type: IMPLANTABLE DEVICE | Status: FUNCTIONAL

## 2022-11-22 DEVICE — CANNULA VENOUS 1 STAGE RIGHT ANGLE METAL TIP 24FR X 3/8": Type: IMPLANTABLE DEVICE | Status: FUNCTIONAL

## 2022-11-22 DEVICE — PACING WIRE WHITE M-24 BAREWIRE 37MM X 89MM: Type: IMPLANTABLE DEVICE | Status: FUNCTIONAL

## 2022-11-22 DEVICE — LIGATING CLIPS WECK HORIZON LARGE (ORANGE) 6: Type: IMPLANTABLE DEVICE | Status: FUNCTIONAL

## 2022-11-22 DEVICE — LIGATING CLIPS WECK HORIZON MEDIUM (BLUE) 24: Type: IMPLANTABLE DEVICE | Status: FUNCTIONAL

## 2022-11-22 DEVICE — SURGICEL NU-KNIT 3 X 4": Type: IMPLANTABLE DEVICE | Status: FUNCTIONAL

## 2022-11-22 DEVICE — KIT A-LINE 1LUM 20G X 12CM SAFE KIT: Type: IMPLANTABLE DEVICE | Status: FUNCTIONAL

## 2022-11-22 RX ORDER — OXYCODONE AND ACETAMINOPHEN 5; 325 MG/1; MG/1
2 TABLET ORAL EVERY 6 HOURS
Refills: 0 | Status: DISCONTINUED | OUTPATIENT
Start: 2022-11-22 | End: 2022-11-22

## 2022-11-22 RX ORDER — POTASSIUM CHLORIDE 20 MEQ
10 PACKET (EA) ORAL
Refills: 0 | Status: DISCONTINUED | OUTPATIENT
Start: 2022-11-22 | End: 2022-11-22

## 2022-11-22 RX ORDER — SENNA PLUS 8.6 MG/1
2 TABLET ORAL AT BEDTIME
Refills: 0 | Status: DISCONTINUED | OUTPATIENT
Start: 2022-11-23 | End: 2022-11-28

## 2022-11-22 RX ORDER — INSULIN HUMAN 100 [IU]/ML
3 INJECTION, SOLUTION SUBCUTANEOUS
Qty: 100 | Refills: 0 | Status: DISCONTINUED | OUTPATIENT
Start: 2022-11-22 | End: 2022-11-22

## 2022-11-22 RX ORDER — DEXTROSE 50 % IN WATER 50 %
50 SYRINGE (ML) INTRAVENOUS
Refills: 0 | Status: DISCONTINUED | OUTPATIENT
Start: 2022-11-22 | End: 2022-11-22

## 2022-11-22 RX ORDER — HYDROMORPHONE HYDROCHLORIDE 2 MG/ML
0.5 INJECTION INTRAMUSCULAR; INTRAVENOUS; SUBCUTANEOUS EVERY 6 HOURS
Refills: 0 | Status: DISCONTINUED | OUTPATIENT
Start: 2022-11-22 | End: 2022-11-22

## 2022-11-22 RX ORDER — HYDROMORPHONE HYDROCHLORIDE 2 MG/ML
0.5 INJECTION INTRAMUSCULAR; INTRAVENOUS; SUBCUTANEOUS ONCE
Refills: 0 | Status: DISCONTINUED | OUTPATIENT
Start: 2022-11-22 | End: 2022-11-22

## 2022-11-22 RX ORDER — INSULIN HUMAN 100 [IU]/ML
3 INJECTION, SOLUTION SUBCUTANEOUS
Qty: 100 | Refills: 0 | Status: DISCONTINUED | OUTPATIENT
Start: 2022-11-22 | End: 2022-11-27

## 2022-11-22 RX ORDER — HYDROMORPHONE HYDROCHLORIDE 2 MG/ML
1 INJECTION INTRAMUSCULAR; INTRAVENOUS; SUBCUTANEOUS EVERY 6 HOURS
Refills: 0 | Status: DISCONTINUED | OUTPATIENT
Start: 2022-11-22 | End: 2022-11-23

## 2022-11-22 RX ORDER — KETOROLAC TROMETHAMINE 30 MG/ML
15 SYRINGE (ML) INJECTION EVERY 6 HOURS
Refills: 0 | Status: DISCONTINUED | OUTPATIENT
Start: 2022-11-22 | End: 2022-11-26

## 2022-11-22 RX ORDER — POLYETHYLENE GLYCOL 3350 17 G/17G
17 POWDER, FOR SOLUTION ORAL DAILY
Refills: 0 | Status: DISCONTINUED | OUTPATIENT
Start: 2022-11-22 | End: 2022-11-22

## 2022-11-22 RX ORDER — POLYETHYLENE GLYCOL 3350 17 G/17G
17 POWDER, FOR SOLUTION ORAL DAILY
Refills: 0 | Status: DISCONTINUED | OUTPATIENT
Start: 2022-11-23 | End: 2022-11-28

## 2022-11-22 RX ORDER — ACETAMINOPHEN 500 MG
650 TABLET ORAL EVERY 6 HOURS
Refills: 0 | Status: COMPLETED | OUTPATIENT
Start: 2022-11-22 | End: 2022-11-25

## 2022-11-22 RX ORDER — OXYCODONE AND ACETAMINOPHEN 5; 325 MG/1; MG/1
1 TABLET ORAL EVERY 4 HOURS
Refills: 0 | Status: DISCONTINUED | OUTPATIENT
Start: 2022-11-22 | End: 2022-11-22

## 2022-11-22 RX ORDER — PANTOPRAZOLE SODIUM 20 MG/1
40 TABLET, DELAYED RELEASE ORAL DAILY
Refills: 0 | Status: DISCONTINUED | OUTPATIENT
Start: 2022-11-22 | End: 2022-11-22

## 2022-11-22 RX ORDER — ASPIRIN/CALCIUM CARB/MAGNESIUM 324 MG
300 TABLET ORAL ONCE
Refills: 0 | Status: DISCONTINUED | OUTPATIENT
Start: 2022-11-22 | End: 2022-11-22

## 2022-11-22 RX ORDER — SODIUM CHLORIDE 9 MG/ML
1000 INJECTION INTRAMUSCULAR; INTRAVENOUS; SUBCUTANEOUS
Refills: 0 | Status: DISCONTINUED | OUTPATIENT
Start: 2022-11-22 | End: 2022-11-22

## 2022-11-22 RX ORDER — ACETAMINOPHEN 500 MG
1000 TABLET ORAL ONCE
Refills: 0 | Status: COMPLETED | OUTPATIENT
Start: 2022-11-22 | End: 2022-11-22

## 2022-11-22 RX ORDER — CALCIUM GLUCONATE 100 MG/ML
2 VIAL (ML) INTRAVENOUS ONCE
Refills: 0 | Status: COMPLETED | OUTPATIENT
Start: 2022-11-22 | End: 2022-11-22

## 2022-11-22 RX ORDER — CHLORHEXIDINE GLUCONATE 213 G/1000ML
15 SOLUTION TOPICAL EVERY 12 HOURS
Refills: 0 | Status: DISCONTINUED | OUTPATIENT
Start: 2022-11-22 | End: 2022-11-22

## 2022-11-22 RX ORDER — SODIUM CHLORIDE 9 MG/ML
1000 INJECTION INTRAMUSCULAR; INTRAVENOUS; SUBCUTANEOUS
Refills: 0 | Status: DISCONTINUED | OUTPATIENT
Start: 2022-11-22 | End: 2022-11-28

## 2022-11-22 RX ORDER — VANCOMYCIN HCL 1 G
1000 VIAL (EA) INTRAVENOUS EVERY 12 HOURS
Refills: 0 | Status: COMPLETED | OUTPATIENT
Start: 2022-11-22 | End: 2022-11-23

## 2022-11-22 RX ORDER — OXYCODONE HYDROCHLORIDE 5 MG/1
5 TABLET ORAL EVERY 4 HOURS
Refills: 0 | Status: DISCONTINUED | OUTPATIENT
Start: 2022-11-22 | End: 2022-11-28

## 2022-11-22 RX ORDER — DEXTROSE 50 % IN WATER 50 %
25 SYRINGE (ML) INTRAVENOUS
Refills: 0 | Status: DISCONTINUED | OUTPATIENT
Start: 2022-11-22 | End: 2022-11-22

## 2022-11-22 RX ORDER — CEFTRIAXONE 500 MG/1
2000 INJECTION, POWDER, FOR SOLUTION INTRAMUSCULAR; INTRAVENOUS EVERY 24 HOURS
Refills: 0 | Status: DISCONTINUED | OUTPATIENT
Start: 2022-11-23 | End: 2022-11-28

## 2022-11-22 RX ORDER — DOBUTAMINE HCL 250MG/20ML
2.5 VIAL (ML) INTRAVENOUS
Qty: 500 | Refills: 0 | Status: DISCONTINUED | OUTPATIENT
Start: 2022-11-22 | End: 2022-11-23

## 2022-11-22 RX ORDER — ASCORBIC ACID 60 MG
500 TABLET,CHEWABLE ORAL
Refills: 0 | Status: COMPLETED | OUTPATIENT
Start: 2022-11-22 | End: 2022-11-27

## 2022-11-22 RX ORDER — HYDROMORPHONE HYDROCHLORIDE 2 MG/ML
1 INJECTION INTRAMUSCULAR; INTRAVENOUS; SUBCUTANEOUS EVERY 4 HOURS
Refills: 0 | Status: DISCONTINUED | OUTPATIENT
Start: 2022-11-22 | End: 2022-11-26

## 2022-11-22 RX ORDER — DEXMEDETOMIDINE HYDROCHLORIDE IN 0.9% SODIUM CHLORIDE 4 UG/ML
0.5 INJECTION INTRAVENOUS
Qty: 200 | Refills: 0 | Status: DISCONTINUED | OUTPATIENT
Start: 2022-11-22 | End: 2022-11-22

## 2022-11-22 RX ORDER — AMIODARONE HYDROCHLORIDE 400 MG/1
400 TABLET ORAL
Refills: 0 | Status: COMPLETED | OUTPATIENT
Start: 2022-11-22 | End: 2022-11-25

## 2022-11-22 RX ORDER — OXYCODONE HYDROCHLORIDE 5 MG/1
10 TABLET ORAL EVERY 4 HOURS
Refills: 0 | Status: DISCONTINUED | OUTPATIENT
Start: 2022-11-22 | End: 2022-11-26

## 2022-11-22 RX ORDER — SODIUM CHLORIDE 9 MG/ML
500 INJECTION, SOLUTION INTRAVENOUS ONCE
Refills: 0 | Status: COMPLETED | OUTPATIENT
Start: 2022-11-22 | End: 2022-11-22

## 2022-11-22 RX ORDER — PANTOPRAZOLE SODIUM 20 MG/1
40 TABLET, DELAYED RELEASE ORAL
Refills: 0 | Status: DISCONTINUED | OUTPATIENT
Start: 2022-11-22 | End: 2022-11-28

## 2022-11-22 RX ORDER — CEFTRIAXONE 500 MG/1
2000 INJECTION, POWDER, FOR SOLUTION INTRAMUSCULAR; INTRAVENOUS EVERY 24 HOURS
Refills: 0 | Status: DISCONTINUED | OUTPATIENT
Start: 2022-11-22 | End: 2022-11-22

## 2022-11-22 RX ORDER — CHLORHEXIDINE GLUCONATE 213 G/1000ML
1 SOLUTION TOPICAL DAILY
Refills: 0 | Status: DISCONTINUED | OUTPATIENT
Start: 2022-11-22 | End: 2022-11-28

## 2022-11-22 RX ORDER — GABAPENTIN 400 MG/1
100 CAPSULE ORAL EVERY 8 HOURS
Refills: 0 | Status: COMPLETED | OUTPATIENT
Start: 2022-11-22 | End: 2022-11-27

## 2022-11-22 RX ORDER — ACETAMINOPHEN 500 MG
650 TABLET ORAL EVERY 6 HOURS
Refills: 0 | Status: COMPLETED | OUTPATIENT
Start: 2022-11-25 | End: 2023-10-24

## 2022-11-22 RX ORDER — ASPIRIN/CALCIUM CARB/MAGNESIUM 324 MG
81 TABLET ORAL DAILY
Refills: 0 | Status: DISCONTINUED | OUTPATIENT
Start: 2022-11-22 | End: 2022-11-28

## 2022-11-22 RX ORDER — ONDANSETRON 8 MG/1
4 TABLET, FILM COATED ORAL ONCE
Refills: 0 | Status: COMPLETED | OUTPATIENT
Start: 2022-11-22 | End: 2022-11-22

## 2022-11-22 RX ORDER — MEPERIDINE HYDROCHLORIDE 50 MG/ML
25 INJECTION INTRAMUSCULAR; INTRAVENOUS; SUBCUTANEOUS ONCE
Refills: 0 | Status: DISCONTINUED | OUTPATIENT
Start: 2022-11-22 | End: 2022-11-22

## 2022-11-22 RX ADMIN — HYDROMORPHONE HYDROCHLORIDE 0.5 MILLIGRAM(S): 2 INJECTION INTRAMUSCULAR; INTRAVENOUS; SUBCUTANEOUS at 14:06

## 2022-11-22 RX ADMIN — Medication 4.78 MICROGRAM(S)/KG/MIN: at 15:18

## 2022-11-22 RX ADMIN — Medication 15 MILLIGRAM(S): at 21:41

## 2022-11-22 RX ADMIN — GABAPENTIN 100 MILLIGRAM(S): 400 CAPSULE ORAL at 22:12

## 2022-11-22 RX ADMIN — Medication 400 MILLIGRAM(S): at 17:40

## 2022-11-22 RX ADMIN — HYDROMORPHONE HYDROCHLORIDE 0.5 MILLIGRAM(S): 2 INJECTION INTRAMUSCULAR; INTRAVENOUS; SUBCUTANEOUS at 15:00

## 2022-11-22 RX ADMIN — DEXMEDETOMIDINE HYDROCHLORIDE IN 0.9% SODIUM CHLORIDE 7.96 MICROGRAM(S)/KG/HR: 4 INJECTION INTRAVENOUS at 15:16

## 2022-11-22 RX ADMIN — SODIUM CHLORIDE 1000 MILLILITER(S): 9 INJECTION, SOLUTION INTRAVENOUS at 14:05

## 2022-11-22 RX ADMIN — SODIUM CHLORIDE 10 MILLILITER(S): 9 INJECTION INTRAMUSCULAR; INTRAVENOUS; SUBCUTANEOUS at 15:19

## 2022-11-22 RX ADMIN — HYDROMORPHONE HYDROCHLORIDE 0.5 MILLIGRAM(S): 2 INJECTION INTRAMUSCULAR; INTRAVENOUS; SUBCUTANEOUS at 19:42

## 2022-11-22 RX ADMIN — HYDROMORPHONE HYDROCHLORIDE 0.5 MILLIGRAM(S): 2 INJECTION INTRAMUSCULAR; INTRAVENOUS; SUBCUTANEOUS at 14:20

## 2022-11-22 RX ADMIN — CHLORHEXIDINE GLUCONATE 15 MILLILITER(S): 213 SOLUTION TOPICAL at 17:47

## 2022-11-22 RX ADMIN — ONDANSETRON 4 MILLIGRAM(S): 8 TABLET, FILM COATED ORAL at 19:42

## 2022-11-22 RX ADMIN — HYDROMORPHONE HYDROCHLORIDE 0.5 MILLIGRAM(S): 2 INJECTION INTRAMUSCULAR; INTRAVENOUS; SUBCUTANEOUS at 16:35

## 2022-11-22 RX ADMIN — CHLORHEXIDINE GLUCONATE 30 MILLILITER(S): 213 SOLUTION TOPICAL at 06:16

## 2022-11-22 RX ADMIN — HYDROMORPHONE HYDROCHLORIDE 0.5 MILLIGRAM(S): 2 INJECTION INTRAMUSCULAR; INTRAVENOUS; SUBCUTANEOUS at 19:27

## 2022-11-22 RX ADMIN — HYDROMORPHONE HYDROCHLORIDE 0.5 MILLIGRAM(S): 2 INJECTION INTRAMUSCULAR; INTRAVENOUS; SUBCUTANEOUS at 21:45

## 2022-11-22 RX ADMIN — HYDROMORPHONE HYDROCHLORIDE 0.5 MILLIGRAM(S): 2 INJECTION INTRAMUSCULAR; INTRAVENOUS; SUBCUTANEOUS at 22:00

## 2022-11-22 RX ADMIN — SODIUM CHLORIDE 3 MILLILITER(S): 9 INJECTION INTRAMUSCULAR; INTRAVENOUS; SUBCUTANEOUS at 05:38

## 2022-11-22 RX ADMIN — Medication 15 MILLIGRAM(S): at 22:00

## 2022-11-22 RX ADMIN — Medication 200 GRAM(S): at 15:16

## 2022-11-22 RX ADMIN — HYDROMORPHONE HYDROCHLORIDE 0.5 MILLIGRAM(S): 2 INJECTION INTRAMUSCULAR; INTRAVENOUS; SUBCUTANEOUS at 14:45

## 2022-11-22 RX ADMIN — Medication 1000 MILLIGRAM(S): at 17:55

## 2022-11-22 RX ADMIN — Medication 4.78 MICROGRAM(S)/KG/MIN: at 19:33

## 2022-11-22 RX ADMIN — INSULIN HUMAN 3 UNIT(S)/HR: 100 INJECTION, SOLUTION SUBCUTANEOUS at 19:33

## 2022-11-22 RX ADMIN — Medication 81 MILLIGRAM(S): at 22:12

## 2022-11-22 RX ADMIN — Medication 250 MILLIGRAM(S): at 21:00

## 2022-11-22 RX ADMIN — CEFTRIAXONE 100 MILLIGRAM(S): 500 INJECTION, POWDER, FOR SOLUTION INTRAMUSCULAR; INTRAVENOUS at 16:13

## 2022-11-22 RX ADMIN — PANTOPRAZOLE SODIUM 40 MILLIGRAM(S): 20 TABLET, DELAYED RELEASE ORAL at 06:16

## 2022-11-22 RX ADMIN — HYDROMORPHONE HYDROCHLORIDE 0.5 MILLIGRAM(S): 2 INJECTION INTRAMUSCULAR; INTRAVENOUS; SUBCUTANEOUS at 16:20

## 2022-11-22 NOTE — PRE-ANESTHESIA EVALUATION ADULT - NSANTHADDINFOFT_GEN_ALL_CORE
Anesthetic plan, including risks and benefits, discussed with patient.  Patient seen and evaluated in the holding area prior to entering the operating room.

## 2022-11-22 NOTE — BRIEF OPERATIVE NOTE - NSICDXBRIEFPROCEDURE_GEN_ALL_CORE_FT
PROCEDURES:  Mitral valve replacement 22-Nov-2022 14:00:23 31mm Epic Tissue Valve Sanjiv Nava  VSD repair 22-Nov-2022 14:00:44  Sanjiv Nava

## 2022-11-22 NOTE — PROGRESS NOTE ADULT - SUBJECTIVE AND OBJECTIVE BOX
HPI:  31 yo F w PMH of congenital VSD (uncorrected) presents to 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 Initially patient presented to the [facility] ED w chronic cough, SOB, hematemesis & hemoptysis. Pt complained of  chronic cough for the past 2 months, initially non-productive, now slightly productive associated with SOB. Initially she was able to climb one flight of stairs, but the SOB & cough have worsened over the past 3 weeks, now claims that she would be short of breath if she walked 5ft She was seen by her PCP & pulmonologist for the cough & sob and was advised to use inhalers and azithromycin with no relief. Wednesda night 11/9 into Thursday AM 11/10 her cough worsened and also had an episode of vomiting, found 10-15 ml of blood mixed with the vomitus. Her cough episode later showed sputum w blood streaks. Claims that this was new, and never happened in the past 2 months. Patient was admited to [facility]. Patient was found to be in Acute HF and an echo was done showing Severe mitral valve regurgitation and . Patient was then transferred to [facility] to 11/14 under Dr. Denson for Evaluation of MV regurgitation/endocarditis and VSD. Patient currently admits to coughing that has only been helped by Lasix that was being administered at [facility].  most Recent travel in may to [facility] currently denies Chest pain, fevers, chills, diarrhea, headaches, urinary or bowel changes.   (14 Nov 2022 22:45)      Patient seen and examined at the bedside.    Remained critically ill on continuous ICU monitoring.    OBJECTIVE:  Vital Signs Last 24 Hrs  T(C): 36.6 (22 Nov 2022 16:00), Max: 36.8 (21 Nov 2022 19:30)  T(F): 97.9 (22 Nov 2022 16:00), Max: 98.2 (21 Nov 2022 19:30)  HR: 97 (22 Nov 2022 17:30) (85 - 104)  BP: 97/71 (22 Nov 2022 08:09) (97/71 - 103/71)  BP(mean): 82 (22 Nov 2022 08:09) (82 - 82)  RR: 28 (22 Nov 2022 17:30) (12 - 32)  SpO2: 98% (22 Nov 2022 17:30) (95% - 100%)    Parameters below as of 22 Nov 2022 17:24  Patient On (Oxygen Delivery Method): mask, aerosol  O2 Flow (L/min): 10  O2 Concentration (%): 40    Physical Exam:   General: NAD   Neurology: Sedated   Eyes: bilateral pupils equal and reactive   ENT/Neck: Neck supple, trachea midline, No JVD   Respiratory: Clear bilaterally   CV: S1S2, no murmurs        [x] Sternal dressing, [x] Mediastinal CT, [x] Pleural CT x2        [x] Sinus Tachycardia [x] Temporary pacing  Abdominal: Soft, NT, ND +BS  Extremities: 1-2+ pedal edema noted, + peripheral pulses   Skin: No Rashes, Hematoma, Ecchymosis                         Assessment:  31 yo F w PMH of congenital VSD (uncorrected) presents to 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 Initially patient presented to the [facility] ED w chronic cough, SOB, hematemesis & hemoptysis. Pt complained of  chronic cough for the past 2 months, initially non-productive, now slightly productive associated with SOB. Initially she was able to climb one flight of stairs, but the SOB & cough have worsened over the past 3 weeks, now claims that she would be short of breath if she walked 5ft She was seen by her PCP & pulmonologist for the cough & sob and was advised to use inhalers and azithromycin with no relief. Wednesda night 11/9 into Thursday AM 11/10 her cough worsened and also had an episode of vomiting, found 10-15 ml of blood mixed with the vomitus. Her cough episode later showed sputum w blood streaks. Claims that this was new, and never happened in the past 2 months. Patient was admited to [facility]. Patient was found to be in Acute HF and an echo was done showing Severe mitral valve regurgitation and . Patient was then transferred to [facility] to 11/14 under Dr. Denson for Evaluation of MV regurgitation/endocarditis and VSD. Patient currently admits to coughing that has only been helped by Lasix that was being administered at [facility].  most Recent travel in may to [facility] currently denies Chest pain, fevers, chills, diarrhea, headaches, urinary or bowel changes.    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     ***Cardiovascular***  Patient admitted with endocarditis s/p MVR, VSD repair on 11/22/22. Inotropic support with IV dobutamine infusion for systolic heart failure. Continue amiodarone for atrial fibrillation prophylaxis. Invasive hemodynamic monitoring with a central venous catheter & an A-line were required for the continuous central venous and MAP/BP monitoring to ensure adequate cardiovascular support. Temporary epicardial pacing wires in place.     ***Pulmonary***  Comfortable on room air. Continue close monitoring of breathing pattern, respiratory rate, the following of continuous pulse oximetry for support and intermittent blood gas analysis to prevent decompensation.     ***Heme***  Anemia due to acute blood loss, continue to transfuse. Monitor hemoglobin and hematocrit levels.     ***GI***  NPO diet. Protonix for stress ulcer prophylaxis.     ***Renal***  Optimize renal perfusion with adequate volume resuscitation and continued monitoring of urine output, fluid balance, electrolytes, and BUN/Creatinine.      ***ID***  Afebrile, white blood count elevated to 22.32. Continue trending white blood count and monitoring fever curve. Postoperative coverage with Vancomycin IVPB.     ***Endocrine***  Metabolic stability, stress hyperglycemia required an IV regular Insulin drip while following serial glucose levels to help achieve and maintain euglycemia.          Patient requires continuous monitoring with bedside rhythm monitoring, pulse oximetry monitoring, and continuous central venous and arterial pressure monitoring; and intermittent blood gas analysis. Care plan discussed with the ICU care team.   Patient remained critical, at risk for life threatening decompensation.    I have spent 30 minutes providing critical care management to this patient.    By signing my name below, I, Heather Boswell, attest that this documentation has been prepared under the direction and in the presence of Elsi Giron MD   Electronically signed: Lilian Boogie, 11-22-22 @ 17:41    I, Elsi Giron, personally performed the services described in this documentation. all medical record entries made by the scribe were at my direction and in my presence. 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 Initially patient presented to the [facility] ED w chronic cough, SOB, hematemesis & hemoptysis. Pt complained of  chronic cough for the past 2 months, initially non-productive, now slightly productive associated with SOB. Initially she was able to climb one flight of stairs, but the SOB & cough have worsened over the past 3 weeks, now claims that she would be short of breath if she walked 5ft She was seen by her PCP & pulmonologist for the cough & sob and was advised to use inhalers and azithromycin with no relief. Wednesda night 11/9 into Thursday AM 11/10 her cough worsened and also had an episode of vomiting, found 10-15 ml of blood mixed with the vomitus. Her cough episode later showed sputum w blood streaks. Claims that this was new, and never happened in the past 2 months. Patient was admited to [facility]. Patient was found to be in Acute HF and an echo was done showing Severe mitral valve regurgitation and . Patient was then transferred to [facility] to 11/14 under Dr. Denson for Evaluation of MV regurgitation/endocarditis and VSD. Patient currently admits to coughing that has only been helped by Lasix that was being administered at [facility].  most Recent travel in may to [facility] currently denies Chest pain, fevers, chills, diarrhea, headaches, urinary or bowel changes.   (14 Nov 2022 22:45)      Patient seen and examined at the bedside.    Remained critically ill on continuous ICU monitoring.    OBJECTIVE:  Vital Signs Last 24 Hrs  T(C): 36.6 (22 Nov 2022 16:00), Max: 36.8 (21 Nov 2022 19:30)  T(F): 97.9 (22 Nov 2022 16:00), Max: 98.2 (21 Nov 2022 19:30)  HR: 97 (22 Nov 2022 17:30) (85 - 104)  BP: 97/71 (22 Nov 2022 08:09) (97/71 - 103/71)  BP(mean): 82 (22 Nov 2022 08:09) (82 - 82)  RR: 28 (22 Nov 2022 17:30) (12 - 32)  SpO2: 98% (22 Nov 2022 17:30) (95% - 100%)    Parameters below as of 22 Nov 2022 17:24  Patient On (Oxygen Delivery Method): mask, aerosol  O2 Flow (L/min): 10  O2 Concentration (%): 40    Physical Exam:   General: NAD   Neurology: Sedated   Eyes: bilateral pupils equal and reactive   ENT/Neck: Neck supple, trachea midline, No JVD   Respiratory: Clear bilaterally   CV: S1S2, no murmurs        [x] Sternal dressing, [x] Mediastinal CT, [x] Pleural CT x2        [x] Sinus Tachycardia [x] Temporary pacing  Abdominal: Soft, NT, ND +BS  Extremities: 1-2+ pedal edema noted, + peripheral pulses   Skin: No Rashes, Hematoma, Ecchymosis                         Assessment:  31 yo F w PMH of congenital VSD (uncorrected) presents to [facility] for 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 Initially patient presented to the [facility] ED w chronic cough, SOB, hematemesis & hemoptysis. Pt complained of  chronic cough for the past 2 months, initially non-productive, now slightly productive associated with SOB. Initially she was able to climb one flight of stairs, but the SOB & cough have worsened over the past 3 weeks, now claims that she would be short of breath if she walked 5ft She was seen by her PCP & pulmonologist for the cough & sob and was advised to use inhalers and azithromycin with no relief. Wednesda night 11/9 into Thursday AM 11/10 her cough worsened and also had an episode of vomiting, found 10-15 ml of blood mixed with the vomitus. Her cough episode later showed sputum w blood streaks. Claims that this was new, and never happened in the past 2 months. Patient was admited to [facility]. Patient was found to be in Acute HF and an echo was done showing Severe mitral valve regurgitation and . Patient was then transferred to [facility] to 11/14 under Dr. Denson for Evaluation of MV regurgitation/endocarditis and VSD. Patient currently admits to coughing that has only been helped by Lasix that was being administered at [facility].  most Recent travel in may to [facility] currently denies Chest pain, fevers, chills, diarrhea, headaches, urinary or bowel changes.    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     ***Cardiovascular***  Patient admitted with endocarditis s/p MVR, VSD repair on 11/22/22. Inotropic support with IV dobutamine infusion for systolic heart failure. Continue amiodarone for atrial fibrillation prophylaxis. Invasive hemodynamic monitoring with a central venous catheter & an A-line were required for the continuous central venous and MAP/BP monitoring to ensure adequate cardiovascular support. Temporary epicardial pacing wires in place.     ***Pulmonary***  Comfortable on room air. Continue close monitoring of breathing pattern, respiratory rate, the following of continuous pulse oximetry for support and intermittent blood gas analysis to prevent decompensation.     ***Heme***  Anemia due to acute blood loss, continue to transfuse as needed. Monitor hemoglobin and hematocrit levels.     ***GI***  NPO diet. Protonix for stress ulcer prophylaxis.     ***Renal***  Optimize renal perfusion with adequate volume resuscitation and continued monitoring of urine output, fluid balance, electrolytes, and BUN/Creatinine.      ***ID***  Afebrile, white blood count elevated to 22.32. Continue trending white blood count and monitoring fever curve. Postoperative coverage with Vancomycin IVPB. Rocephin IVPB for infective endocarditis.     ***Endocrine***  Metabolic stability, stress hyperglycemia required an IV regular Insulin drip while following serial glucose levels to help achieve and maintain euglycemia.          Patient requires continuous monitoring with bedside rhythm monitoring, pulse oximetry monitoring, and continuous central venous and arterial pressure monitoring; and intermittent blood gas analysis. Care plan discussed with the ICU care team.   Patient remained critical, at risk for life threatening decompensation.    I have spent 30 minutes providing critical care management to this patient.    By signing my name below, I, Heather Boswell, attest that this documentation has been prepared under the direction and in the presence of Elsi Giron MD   Electronically signed: Lilian Boogie, 11-22-22 @ 17:41    I, Elsi Giron, personally performed the services described in this documentation. all medical record entries made by the scribe were at my direction and in my presence. I have reviewed the chart and agree that the record reflects my personal performance and is accurate and complete  Electronically signed: Elsi Giron MD  HPI:  33 yo F w PMH of congenital VSD (uncorrected) presents to [facility] for 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 Initially patient presented to the [facility] ED w chronic cough, SOB, hematemesis & hemoptysis. Pt complained of  chronic cough for the past 2 months, initially non-productive, now slightly productive associated with SOB. Initially she was able to climb one flight of stairs, but the SOB & cough have worsened over the past 3 weeks, now claims that she would be short of breath if she walked 5ft She was seen by her PCP & pulmonologist for the cough & sob and was advised to use inhalers and azithromycin with no relief. Wednesda night 11/9 into Thursday AM 11/10 her cough worsened and also had an episode of vomiting, found 10-15 ml of blood mixed with the vomitus. Her cough episode later showed sputum w blood streaks. Claims that this was new, and never happened in the past 2 months. Patient was admited to [facility]. Patient was found to be in Acute HF and an echo was done showing Severe mitral valve regurgitation and . Patient was then transferred to [facility] to 11/14 under Dr. Denson for Evaluation of MV regurgitation/endocarditis and VSD. Patient currently admits to coughing that has only been helped by Lasix that was being administered at [facility].  most Recent travel in may to [facility] currently denies Chest pain, fevers, chills, diarrhea, headaches, urinary or bowel changes.   (14 Nov 2022 22:45)      Patient seen and examined at the bedside.    Remained critically ill on continuous ICU monitoring.    OBJECTIVE:  Vital Signs Last 24 Hrs  T(C): 36.6 (22 Nov 2022 16:00), Max: 36.8 (21 Nov 2022 19:30)  T(F): 97.9 (22 Nov 2022 16:00), Max: 98.2 (21 Nov 2022 19:30)  HR: 97 (22 Nov 2022 17:30) (85 - 104)  BP: 97/71 (22 Nov 2022 08:09) (97/71 - 103/71)  BP(mean): 82 (22 Nov 2022 08:09) (82 - 82)  RR: 28 (22 Nov 2022 17:30) (12 - 32)  SpO2: 98% (22 Nov 2022 17:30) (95% - 100%)    Parameters below as of 22 Nov 2022 17:24  Patient On (Oxygen Delivery Method): mask, aerosol  O2 Flow (L/min): 10  O2 Concentration (%): 40    Physical Exam:   General: awake and calm, breathing comfortably at rest   Neurology: Without focal deficit  Eyes: bilateral pupils equal and reactive   ENT/Neck: Neck supple, trachea midline, No JVD   Respiratory: Clear bilaterally   CV: S1S2, no murmurs        [x] Sternal dressing, [x] Mediastinal CT, [x] Pleural CT x2        [x] Sinus Tachycardia [x] Temporary pacing  Abdominal: Soft, NT, ND +BS  Extremities: trace pedal edema noted, + peripheral pulses   Skin: No Rashes, Hematoma, Ecchymosis                         Assessment:  33 yo F w PMH of congenital VSD (uncorrected) presents to [facility] for 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 Initially patient presented to the [facility] ED w chronic cough, SOB, hematemesis & hemoptysis. Pt complained of  chronic cough for the past 2 months, initially non-productive, now slightly productive associated with SOB. Initially she was able to climb one flight of stairs, but the SOB & cough have worsened over the past 3 weeks, now claims that she would be short of breath if she walked 5ft She was seen by her PCP & pulmonologist for the cough & sob and was advised to use inhalers and azithromycin with no relief. Wednesda night 11/9 into Thursday AM 11/10 her cough worsened and also had an episode of vomiting, found 10-15 ml of blood mixed with the vomitus. Her cough episode later showed sputum w blood streaks. Claims that this was new, and never happened in the past 2 months. Patient was admited to [facility]. Patient was found to be in Acute HF and an echo was done showing Severe mitral valve regurgitation and . Patient was then transferred to [facility] to 11/14 under Dr. Denson for Evaluation of MV regurgitation/endocarditis and VSD. Patient currently admits to coughing that has only been helped by Lasix that was being administered at [facility].  most Recent travel in may to [facility] currently denies Chest pain, fevers, chills, diarrhea, headaches, urinary or bowel changes.    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 Toradol added and Dilaudid increased due to complains of ongoing pain. Without focal deficit.    ***Cardiovascular***  Patient admitted with endocarditis s/p MVR, VSD repair on 11/22/22. Inotropic support with IV dobutamine infusion for acute on chronic systolic right heart failure and myocardial dysfunction post MVR. Continue amiodarone for atrial fibrillation prophylaxis. Invasive hemodynamic monitoring with a central venous catheter & an A-line were required for the continuous central venous and MAP/BP monitoring to ensure adequate cardiovascular support. Temporary epicardial pacing wires in place.     ***Pulmonary***  Patient initially required full ventilator support and was subsequently weaned to pressure support and then extubated to oxygen via face mask. Continue close monitoring of breathing pattern, respiratory rate, the following of continuous pulse oximetry for support and intermittent blood gas analysis to prevent decompensation.     ***Heme***  Anemia due to acute blood loss, no current plan for transfusion. Monitor hemoglobin and hematocrit levels.     ***GI***  NPO. Protonix for stress ulcer prophylaxis.     ***Renal***  Optimize renal perfusion with adequate volume resuscitation and continued monitoring of urine output, fluid balance, electrolytes, and BUN/Creatinine.      ***ID***  Afebrile, white blood count elevated to 22.32. Continue trending white blood count and monitoring fever curve. Perioperative coverage with Vancomycin IVPB. 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     ***Endocrine***  Metabolic stability, stress hyperglycemia required an IV regular Insulin drip while following serial glucose levels to help achieve and maintain euglycemia.          Patient requires continuous monitoring with bedside rhythm monitoring, pulse oximetry monitoring, and continuous central venous and arterial pressure monitoring; and intermittent blood gas analysis. Care plan discussed with the ICU care team.   Patient remained critical, at risk for life threatening decompensation.    I have spent 35 minutes providing critical care management to this patient.    By signing my name below, I, Heather Boswell, attest that this documentation has been prepared under the direction and in the presence of Elsi Giron MD   Electronically signed: Lilian Boogie, 11-22-22 @ 17:41    I, Elsi Giron, personally performed the services described in this documentation. all medical record entries made by the jumanaibe were at my direction and in my presence. I have reviewed the chart and agree that the record reflects my personal performance and is accurate and complete  Electronically signed: Elsi Giron MD

## 2022-11-22 NOTE — PROGRESS NOTE ADULT - SUBJECTIVE AND OBJECTIVE BOX
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 650 milliGRAM(s) Oral every 6 hours  aMIOdarone    Tablet 400 milliGRAM(s) Oral two times a day  ascorbic acid 500 milliGRAM(s) Oral two times a day  chlorhexidine 2% Cloths 1 Application(s) Topical daily  dextrose 50% Injectable 50 milliLiter(s) IV Push every 15 minutes  dextrose 50% Injectable 25 milliLiter(s) IV Push every 15 minutes  DOBUTamine Infusion 2.5 MICROgram(s)/kG/Min IV Continuous <Continuous>  gabapentin 100 milliGRAM(s) Oral every 8 hours  HYDROmorphone  Injectable 0.5 milliGRAM(s) IV Push every 6 hours PRN  insulin regular Infusion 3 Unit(s)/Hr IV Continuous <Continuous>  oxyCODONE    IR 5 milliGRAM(s) Oral every 4 hours PRN  oxyCODONE    IR 10 milliGRAM(s) Oral every 4 hours PRN  pantoprazole  Injectable 40 milliGRAM(s) IV Push daily  potassium chloride  10 mEq/50 mL IVPB 10 milliEquivalent(s) IV Intermittent every 1 hour  potassium chloride  10 mEq/50 mL IVPB 10 milliEquivalent(s) IV Intermittent every 1 hour  potassium chloride  10 mEq/50 mL IVPB 10 milliEquivalent(s) IV Intermittent every 1 hour  sodium chloride 0.9%. 1000 milliLiter(s) IV Continuous <Continuous>      Objective:  Vital Signs Last 24 Hrs  T(C): 36.6 (22 Nov 2022 16:00), Max: 36.8 (21 Nov 2022 19:30)  T(F): 97.9 (22 Nov 2022 16:00), Max: 98.2 (21 Nov 2022 19:30)  HR: 95 (22 Nov 2022 19:00) (85 - 104)  BP: 97/71 (22 Nov 2022 08:09) (97/71 - 103/71)  BP(mean): 82 (22 Nov 2022 08:09) (82 - 82)  RR: 21 (22 Nov 2022 19:00) (12 - 32)  SpO2: 98% (22 Nov 2022 19:00) (95% - 100%)    Parameters below as of 22 Nov 2022 20:00  Patient On (Oxygen Delivery Method): nasal cannula  O2 Flow (L/min): 3      PHYSICAL EXAM:  Constitutional:no acute distress  Eyes:JAMIE, EOMI  Ear/Nose/Throat: no oral lesions, 	  Respiratory: clear BL anteriorly  sternotomy dressing CDI  three chest tubes in place minimal bulb drainage output  Cardiovascular: S1S2  Gastrointestinal:soft, (+) BS, no tenderness  Extremities:no e/e/c  No Lymphadenopathy  IV sites not inflammed.    LABS:                        10.5   22.32 )-----------( 313      ( 22 Nov 2022 14:35 )             31.6     11-22    144  |  104  |  11  ----------------------------<  99  4.3   |  23  |  0.93    Ca    8.8      22 Nov 2022 15:57  Phos  5.4     11-22  Mg     3.5     11-22    TPro  6.3  /  Alb  3.5  /  TBili  1.1  /  DBili  x   /  AST  82<H>  /  ALT  60<H>  /  AlkPhos  86  11-22    PT/INR - ( 22 Nov 2022 14:25 )   PT: 14.5 sec;   INR: 1.26 ratio         PTT - ( 22 Nov 2022 14:25 )  PTT:28.2 sec      MICROBIOLOGY:            RECENT CULTURES:  11-18 @ 06:15  .Blood Blood-Peripheral  --  --  --    No growth to date.  --  11-18 @ 06:10  .Blood Blood-Peripheral  --  --  --    No growth to date.  --      RADIOLOGY & ADDITIONAL STUDIES:    < from: CT Chest w/ IV Cont (11.18.22 @ 19:04) >    IMPRESSION:  Stable small right and new small left pleural effusion.    Mild pulmonary interstitial edema.    No acute abdominal pathology.    < end of copied text >

## 2022-11-22 NOTE — PROGRESS NOTE ADULT - ASSESSMENT
32 year old with history of unrepaired VSD presents with sob, cough , hemoptysis , fever, fatigue to [facility]. 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 She has no dental problems and no recent dental work except for replacement of a cap months ago   Denies HA, change i mental status  Feels better since ab started but having very bad burning fro m the pcn infusion.   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

## 2022-11-22 NOTE — PRE-ANESTHESIA EVALUATION ADULT - NSANTHPMHFT_GEN_ALL_CORE
** from H&P**  31 yo F w PMH of congenital VSD (uncorrected) presents to [facility] CTS for 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 Initially patient presented to the [facility] ED w chronic cough, SOB, hematemesis & hemoptysis. Pt complained of  chronic cough for the past 2 months, initially non-productive, now worsened; patient was initially  admitted to [facility], found to be in Acute HF and an echo was done showing Severe mitral valve regurgitation. Positive blood cultures.

## 2022-11-22 NOTE — BRIEF OPERATIVE NOTE - COMMENTS
AXC: 101 min    Blood loss unable to be accurately calculated due to the use of cell saver and cardiotomy suction

## 2022-11-22 NOTE — AIRWAY REMOVAL NOTE  ADULT & PEDS - ARTIFICAL AIRWAY REMOVAL COMMENTS
Written order for extubation verified. The patient was identified by full name and birth date compared to the identification band. Present during the procedure was jonelle ERAZO

## 2022-11-23 DIAGNOSIS — Z86.79 PERSONAL HISTORY OF OTHER DISEASES OF THE CIRCULATORY SYSTEM: ICD-10-CM

## 2022-11-23 LAB
ALBUMIN SERPL ELPH-MCNC: 3.8 G/DL — SIGNIFICANT CHANGE UP (ref 3.3–5)
ALP SERPL-CCNC: 89 U/L — SIGNIFICANT CHANGE UP (ref 40–120)
ALT FLD-CCNC: 59 U/L — HIGH (ref 10–45)
ANION GAP SERPL CALC-SCNC: 13 MMOL/L — SIGNIFICANT CHANGE UP (ref 5–17)
APTT BLD: 28.8 SEC — SIGNIFICANT CHANGE UP (ref 27.5–35.5)
AST SERPL-CCNC: 76 U/L — HIGH (ref 10–40)
BASE EXCESS BLDV CALC-SCNC: -1.8 MMOL/L — SIGNIFICANT CHANGE UP (ref -2–3)
BASE EXCESS BLDV CALC-SCNC: -3.3 MMOL/L — LOW (ref -2–3)
BASE EXCESS BLDV CALC-SCNC: 0.2 MMOL/L — SIGNIFICANT CHANGE UP (ref -2–3)
BILIRUB SERPL-MCNC: 0.9 MG/DL — SIGNIFICANT CHANGE UP (ref 0.2–1.2)
BUN SERPL-MCNC: 12 MG/DL — SIGNIFICANT CHANGE UP (ref 7–23)
CALCIUM SERPL-MCNC: 9.4 MG/DL — SIGNIFICANT CHANGE UP (ref 8.4–10.5)
CHLORIDE SERPL-SCNC: 102 MMOL/L — SIGNIFICANT CHANGE UP (ref 96–108)
CO2 BLDV-SCNC: 25 MMOL/L — SIGNIFICANT CHANGE UP (ref 22–26)
CO2 BLDV-SCNC: 26 MMOL/L — SIGNIFICANT CHANGE UP (ref 22–26)
CO2 BLDV-SCNC: 28 MMOL/L — HIGH (ref 22–26)
CO2 SERPL-SCNC: 23 MMOL/L — SIGNIFICANT CHANGE UP (ref 22–31)
CREAT SERPL-MCNC: 0.86 MG/DL — SIGNIFICANT CHANGE UP (ref 0.5–1.3)
CULTURE RESULTS: SIGNIFICANT CHANGE UP
CULTURE RESULTS: SIGNIFICANT CHANGE UP
EGFR: 91 ML/MIN/1.73M2 — SIGNIFICANT CHANGE UP
FIBRINOGEN PPP-MCNC: 530 MG/DL — HIGH (ref 200–445)
GAS PNL BLDA: SIGNIFICANT CHANGE UP
GAS PNL BLDV: SIGNIFICANT CHANGE UP
GAS PNL BLDV: SIGNIFICANT CHANGE UP
GLUCOSE BLDC GLUCOMTR-MCNC: 101 MG/DL — HIGH (ref 70–99)
GLUCOSE BLDC GLUCOMTR-MCNC: 106 MG/DL — HIGH (ref 70–99)
GLUCOSE BLDC GLUCOMTR-MCNC: 106 MG/DL — HIGH (ref 70–99)
GLUCOSE BLDC GLUCOMTR-MCNC: 109 MG/DL — HIGH (ref 70–99)
GLUCOSE BLDC GLUCOMTR-MCNC: 111 MG/DL — HIGH (ref 70–99)
GLUCOSE BLDC GLUCOMTR-MCNC: 116 MG/DL — HIGH (ref 70–99)
GLUCOSE BLDC GLUCOMTR-MCNC: 118 MG/DL — HIGH (ref 70–99)
GLUCOSE BLDC GLUCOMTR-MCNC: 120 MG/DL — HIGH (ref 70–99)
GLUCOSE BLDC GLUCOMTR-MCNC: 138 MG/DL — HIGH (ref 70–99)
GLUCOSE BLDC GLUCOMTR-MCNC: 140 MG/DL — HIGH (ref 70–99)
GLUCOSE BLDC GLUCOMTR-MCNC: 158 MG/DL — HIGH (ref 70–99)
GLUCOSE BLDC GLUCOMTR-MCNC: 179 MG/DL — HIGH (ref 70–99)
GLUCOSE BLDC GLUCOMTR-MCNC: 215 MG/DL — HIGH (ref 70–99)
GLUCOSE BLDC GLUCOMTR-MCNC: 395 MG/DL — HIGH (ref 70–99)
GLUCOSE BLDC GLUCOMTR-MCNC: 80 MG/DL — SIGNIFICANT CHANGE UP (ref 70–99)
GLUCOSE BLDC GLUCOMTR-MCNC: 82 MG/DL — SIGNIFICANT CHANGE UP (ref 70–99)
GLUCOSE SERPL-MCNC: 123 MG/DL — HIGH (ref 70–99)
GRAM STN FLD: SIGNIFICANT CHANGE UP
HCO3 BLDV-SCNC: 23 MMOL/L — SIGNIFICANT CHANGE UP (ref 22–29)
HCO3 BLDV-SCNC: 25 MMOL/L — SIGNIFICANT CHANGE UP (ref 22–29)
HCO3 BLDV-SCNC: 27 MMOL/L — SIGNIFICANT CHANGE UP (ref 22–29)
HCT VFR BLD CALC: 33.2 % — LOW (ref 34.5–45)
HGB BLD-MCNC: 10.8 G/DL — LOW (ref 11.5–15.5)
HOROWITZ INDEX BLDV+IHG-RTO: 32 — SIGNIFICANT CHANGE UP
INR BLD: 1.18 RATIO — HIGH (ref 0.88–1.16)
MAGNESIUM SERPL-MCNC: 2.8 MG/DL — HIGH (ref 1.6–2.6)
MCHC RBC-ENTMCNC: 27.1 PG — SIGNIFICANT CHANGE UP (ref 27–34)
MCHC RBC-ENTMCNC: 32.5 GM/DL — SIGNIFICANT CHANGE UP (ref 32–36)
MCV RBC AUTO: 83.2 FL — SIGNIFICANT CHANGE UP (ref 80–100)
NIGHT BLUE STAIN TISS: SIGNIFICANT CHANGE UP
NRBC # BLD: 0 /100 WBCS — SIGNIFICANT CHANGE UP (ref 0–0)
PCO2 BLDV: 46 MMHG — HIGH (ref 39–42)
PCO2 BLDV: 48 MMHG — HIGH (ref 39–42)
PCO2 BLDV: 51 MMHG — HIGH (ref 39–42)
PH BLDV: 7.31 — LOW (ref 7.32–7.43)
PH BLDV: 7.32 — SIGNIFICANT CHANGE UP (ref 7.32–7.43)
PH BLDV: 7.33 — SIGNIFICANT CHANGE UP (ref 7.32–7.43)
PHOSPHATE SERPL-MCNC: 5.4 MG/DL — HIGH (ref 2.5–4.5)
PLATELET # BLD AUTO: 331 K/UL — SIGNIFICANT CHANGE UP (ref 150–400)
PO2 BLDV: 38 MMHG — SIGNIFICANT CHANGE UP (ref 25–45)
PO2 BLDV: 40 MMHG — SIGNIFICANT CHANGE UP (ref 25–45)
PO2 BLDV: 43 MMHG — SIGNIFICANT CHANGE UP (ref 25–45)
POTASSIUM SERPL-MCNC: 3.9 MMOL/L — SIGNIFICANT CHANGE UP (ref 3.5–5.3)
POTASSIUM SERPL-SCNC: 3.9 MMOL/L — SIGNIFICANT CHANGE UP (ref 3.5–5.3)
PROT SERPL-MCNC: 7 G/DL — SIGNIFICANT CHANGE UP (ref 6–8.3)
PROTHROM AB SERPL-ACNC: 13.6 SEC — HIGH (ref 10.5–13.4)
RBC # BLD: 3.99 M/UL — SIGNIFICANT CHANGE UP (ref 3.8–5.2)
RBC # FLD: 15.5 % — HIGH (ref 10.3–14.5)
SAO2 % BLDV: 64 % — LOW (ref 67–88)
SAO2 % BLDV: 65.6 % — LOW (ref 67–88)
SAO2 % BLDV: 68.9 % — SIGNIFICANT CHANGE UP (ref 67–88)
SODIUM SERPL-SCNC: 138 MMOL/L — SIGNIFICANT CHANGE UP (ref 135–145)
SPECIMEN SOURCE: SIGNIFICANT CHANGE UP
WBC # BLD: 16.05 K/UL — HIGH (ref 3.8–10.5)
WBC # FLD AUTO: 16.05 K/UL — HIGH (ref 3.8–10.5)

## 2022-11-23 PROCEDURE — 71045 X-RAY EXAM CHEST 1 VIEW: CPT | Mod: 26,77

## 2022-11-23 PROCEDURE — 99291 CRITICAL CARE FIRST HOUR: CPT

## 2022-11-23 PROCEDURE — 99232 SBSQ HOSP IP/OBS MODERATE 35: CPT | Mod: GC

## 2022-11-23 PROCEDURE — 93010 ELECTROCARDIOGRAM REPORT: CPT

## 2022-11-23 PROCEDURE — 71045 X-RAY EXAM CHEST 1 VIEW: CPT | Mod: 26

## 2022-11-23 RX ORDER — POTASSIUM CHLORIDE 20 MEQ
10 PACKET (EA) ORAL
Refills: 0 | Status: COMPLETED | OUTPATIENT
Start: 2022-11-23 | End: 2022-11-23

## 2022-11-23 RX ORDER — METOCLOPRAMIDE HCL 10 MG
10 TABLET ORAL EVERY 8 HOURS
Refills: 0 | Status: COMPLETED | OUTPATIENT
Start: 2022-11-23 | End: 2022-11-23

## 2022-11-23 RX ORDER — ALBUMIN HUMAN 25 %
100 VIAL (ML) INTRAVENOUS ONCE
Refills: 0 | Status: COMPLETED | OUTPATIENT
Start: 2022-11-23 | End: 2022-11-23

## 2022-11-23 RX ORDER — INSULIN LISPRO 100/ML
VIAL (ML) SUBCUTANEOUS
Refills: 0 | Status: DISCONTINUED | OUTPATIENT
Start: 2022-11-23 | End: 2022-11-27

## 2022-11-23 RX ORDER — ENOXAPARIN SODIUM 100 MG/ML
40 INJECTION SUBCUTANEOUS EVERY 24 HOURS
Refills: 0 | Status: DISCONTINUED | OUTPATIENT
Start: 2022-11-23 | End: 2022-11-24

## 2022-11-23 RX ADMIN — GABAPENTIN 100 MILLIGRAM(S): 400 CAPSULE ORAL at 06:36

## 2022-11-23 RX ADMIN — HYDROMORPHONE HYDROCHLORIDE 1 MILLIGRAM(S): 2 INJECTION INTRAMUSCULAR; INTRAVENOUS; SUBCUTANEOUS at 01:04

## 2022-11-23 RX ADMIN — Medication 650 MILLIGRAM(S): at 00:06

## 2022-11-23 RX ADMIN — HYDROMORPHONE HYDROCHLORIDE 1 MILLIGRAM(S): 2 INJECTION INTRAMUSCULAR; INTRAVENOUS; SUBCUTANEOUS at 19:35

## 2022-11-23 RX ADMIN — Medication 81 MILLIGRAM(S): at 11:44

## 2022-11-23 RX ADMIN — CHLORHEXIDINE GLUCONATE 1 APPLICATION(S): 213 SOLUTION TOPICAL at 11:24

## 2022-11-23 RX ADMIN — Medication 10 MILLIGRAM(S): at 02:16

## 2022-11-23 RX ADMIN — AMIODARONE HYDROCHLORIDE 400 MILLIGRAM(S): 400 TABLET ORAL at 06:36

## 2022-11-23 RX ADMIN — Medication 650 MILLIGRAM(S): at 06:37

## 2022-11-23 RX ADMIN — Medication 15 MILLIGRAM(S): at 02:15

## 2022-11-23 RX ADMIN — PANTOPRAZOLE SODIUM 40 MILLIGRAM(S): 20 TABLET, DELAYED RELEASE ORAL at 06:37

## 2022-11-23 RX ADMIN — AMIODARONE HYDROCHLORIDE 400 MILLIGRAM(S): 400 TABLET ORAL at 18:07

## 2022-11-23 RX ADMIN — Medication 250 MILLIGRAM(S): at 21:07

## 2022-11-23 RX ADMIN — ENOXAPARIN SODIUM 40 MILLIGRAM(S): 100 INJECTION SUBCUTANEOUS at 13:02

## 2022-11-23 RX ADMIN — Medication 650 MILLIGRAM(S): at 11:43

## 2022-11-23 RX ADMIN — Medication 10 MILLIGRAM(S): at 15:14

## 2022-11-23 RX ADMIN — SENNA PLUS 2 TABLET(S): 8.6 TABLET ORAL at 21:07

## 2022-11-23 RX ADMIN — HYDROMORPHONE HYDROCHLORIDE 1 MILLIGRAM(S): 2 INJECTION INTRAMUSCULAR; INTRAVENOUS; SUBCUTANEOUS at 09:45

## 2022-11-23 RX ADMIN — Medication 15 MILLIGRAM(S): at 15:14

## 2022-11-23 RX ADMIN — Medication 10 MILLIGRAM(S): at 21:07

## 2022-11-23 RX ADMIN — Medication 650 MILLIGRAM(S): at 11:59

## 2022-11-23 RX ADMIN — Medication 500 MILLIGRAM(S): at 18:07

## 2022-11-23 RX ADMIN — Medication 15 MILLIGRAM(S): at 21:06

## 2022-11-23 RX ADMIN — Medication 50 MILLIEQUIVALENT(S): at 02:03

## 2022-11-23 RX ADMIN — Medication 4.78 MICROGRAM(S)/KG/MIN: at 08:24

## 2022-11-23 RX ADMIN — GABAPENTIN 100 MILLIGRAM(S): 400 CAPSULE ORAL at 21:06

## 2022-11-23 RX ADMIN — HYDROMORPHONE HYDROCHLORIDE 1 MILLIGRAM(S): 2 INJECTION INTRAMUSCULAR; INTRAVENOUS; SUBCUTANEOUS at 18:48

## 2022-11-23 RX ADMIN — HYDROMORPHONE HYDROCHLORIDE 1 MILLIGRAM(S): 2 INJECTION INTRAMUSCULAR; INTRAVENOUS; SUBCUTANEOUS at 00:36

## 2022-11-23 RX ADMIN — Medication 50 MILLILITER(S): at 09:27

## 2022-11-23 RX ADMIN — HYDROMORPHONE HYDROCHLORIDE 1 MILLIGRAM(S): 2 INJECTION INTRAMUSCULAR; INTRAVENOUS; SUBCUTANEOUS at 01:19

## 2022-11-23 RX ADMIN — Medication 650 MILLIGRAM(S): at 18:07

## 2022-11-23 RX ADMIN — Medication 50 MILLIEQUIVALENT(S): at 01:30

## 2022-11-23 RX ADMIN — CEFTRIAXONE 100 MILLIGRAM(S): 500 INJECTION, POWDER, FOR SOLUTION INTRAMUSCULAR; INTRAVENOUS at 15:51

## 2022-11-23 RX ADMIN — HYDROMORPHONE HYDROCHLORIDE 1 MILLIGRAM(S): 2 INJECTION INTRAMUSCULAR; INTRAVENOUS; SUBCUTANEOUS at 13:12

## 2022-11-23 RX ADMIN — INSULIN HUMAN 3 UNIT(S)/HR: 100 INJECTION, SOLUTION SUBCUTANEOUS at 08:24

## 2022-11-23 RX ADMIN — HYDROMORPHONE HYDROCHLORIDE 1 MILLIGRAM(S): 2 INJECTION INTRAMUSCULAR; INTRAVENOUS; SUBCUTANEOUS at 12:42

## 2022-11-23 RX ADMIN — Medication 15 MILLIGRAM(S): at 02:01

## 2022-11-23 RX ADMIN — HYDROMORPHONE HYDROCHLORIDE 1 MILLIGRAM(S): 2 INJECTION INTRAMUSCULAR; INTRAVENOUS; SUBCUTANEOUS at 05:05

## 2022-11-23 RX ADMIN — HYDROMORPHONE HYDROCHLORIDE 1 MILLIGRAM(S): 2 INJECTION INTRAMUSCULAR; INTRAVENOUS; SUBCUTANEOUS at 05:20

## 2022-11-23 RX ADMIN — HYDROMORPHONE HYDROCHLORIDE 1 MILLIGRAM(S): 2 INJECTION INTRAMUSCULAR; INTRAVENOUS; SUBCUTANEOUS at 00:06

## 2022-11-23 RX ADMIN — Medication 15 MILLIGRAM(S): at 08:24

## 2022-11-23 RX ADMIN — HYDROMORPHONE HYDROCHLORIDE 1 MILLIGRAM(S): 2 INJECTION INTRAMUSCULAR; INTRAVENOUS; SUBCUTANEOUS at 09:27

## 2022-11-23 RX ADMIN — Medication 15 MILLIGRAM(S): at 08:40

## 2022-11-23 RX ADMIN — Medication 250 MILLIGRAM(S): at 09:50

## 2022-11-23 RX ADMIN — Medication 650 MILLIGRAM(S): at 00:36

## 2022-11-23 RX ADMIN — Medication 500 MILLIGRAM(S): at 06:36

## 2022-11-23 NOTE — PHYSICAL THERAPY INITIAL EVALUATION ADULT - PLANNED THERAPY INTERVENTIONS, PT EVAL
Pt currently does not require skilled PT interventions. Independent at baseline. PT to follow patient for re-evaluation post cardiac surg.
LTG 1: Stairs - Pt will be independent with negotiation of 1 flight of stairs within 4 weeks./balance training/bed mobility training/gait training/transfer training

## 2022-11-23 NOTE — PHYSICAL THERAPY INITIAL EVALUATION ADULT - PERTINENT HX OF CURRENT PROBLEM, REHAB EVAL
32y old  Female who presents with a chief complaint of MV Endocarditis/ VSD. Initially patient presented to the [facility] ED w chronic cough, SOB, hematemesis & hemoptysis. Pt complained of  chronic cough for the past 2 months, initially non-productive, now slightly productive associated with SOB. Initially she was able to climb one flight of stairs, but the SOB & cough have worsened over the past 3 weeks, now claims that she would be short of breath if she walked 5ft She was seen by her PCP & pulmonologist for the cough & sob and was advised to use inhalers and azithromycin with no relief. Wednesda night 11/9 into Thursday AM 11/10 her cough worsened and also had an episode of vomiting, found 10-15 ml of blood mixed with the vomitus. Her cough episode later showed sputum w blood streaks. Claims that this was new, and never happened in the past 2 months. Patient was admited to [facility]. Patient was found to be in Acute HF and an echo was done showing Severe mitral valve regurgitation and . Patient was then transferred to [facility] to 11/14 under Dr. Denson for Evaluation of MV regurgitation/endocarditis and VSD. Patient currently admits to coughing that has only been helped by Lasix that was being administered at [facility].  most Recent travel in may to [facility] currently denies Chest pain, fevers, chills, diarrhea, headaches, urinary or bowel changes. VA Duplex Carotid: No significant hemodynamic stenosis of either carotid artery. US Abdomen: Normal right upper quadrant abdominal ultrasound. Chest XR: There are bilateral opacities. No evidence of pneumothorax. CT Angio Chest: No CTA evidence of acute pulmonary embolus. Small right pleural effusion with predominantly bibasilar interlobular septal thickening and diffuse hazy groundglass appearance of the lungs.
31 yo F w PMH of congenital VSD (uncorrected) presents to [facility] CTS for 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 Initially patient presented to the [facility] ED w chronic cough, SOB, hematemesis & hemoptysis. Pt complained of  chronic cough for the past 2 months, initially non-productive, now slightly productive associated with SOB. Initially she was able to climb one flight of stairs, but the SOB & cough have worsened over the past 3 weeks, now claims that she would be short of breath if she walked 5ft She was seen by her PCP & pulmonologist for the cough & sob and was advised to use inhalers and azithromycin with no relief. Wednesda night 11/9 into Thursday AM 11/10 her cough worsened and also had an episode of vomiting, found 10-15 ml of blood mixed with the vomitus. Her cough episode later showed sputum w blood streaks. Claims that this was new, and never happened in the past 2 months. Patient was admited to [facility]. Patient was found to be in Acute HF and an echo was done showing Severe mitral valve regurgitation Patient was then transferred to [facility] to 11/14 under Dr. Denson for Evaluation of MV regurgitation/endocarditis and VSD. Now s/p MVR, VSD repair on 11/22/22.

## 2022-11-23 NOTE — PROGRESS NOTE ADULT - SUBJECTIVE AND OBJECTIVE BOX
VITAL SIGNS    Telemetry:      Vital Signs Last 24 Hrs  T(C): 36.7 (22 @ 08:00), Max: 36.7 (22 @ 08:00)  T(F): 98.1 (22 @ 08:00), Max: 98.1 (22 @ 08:00)  HR: 82 (22 @ 11:59) (80 - 97)  BP: 123/79 (22 @ 11:59) (113/75 - 124/79)  RR: 20 (22 @ 13:00) (12 - 28)  SpO2: 95% (22 @ 13:00) (95% - 100%)                    @ 07:01  -   @ 07:00  --------------------------------------------------------  IN: 1747.6 mL / OUT: 1376 mL / NET: 371.6 mL     @ 07:01  -   @ 14:34  --------------------------------------------------------  IN: 403 mL / OUT: 185 mL / NET: 218 mL          Daily     Daily Weight in k.2 (2022 00:00)            CAPILLARY BLOOD GLUCOSE      POCT Blood Glucose.: 138 mg/dL (2022 13:38)  POCT Blood Glucose.: 111 mg/dL (2022 12:10)  POCT Blood Glucose.: 395 mg/dL (2022 12:09)  POCT Blood Glucose.: 140 mg/dL (2022 10:50)  POCT Blood Glucose.: 179 mg/dL (2022 09:46)  POCT Blood Glucose.: 215 mg/dL (2022 09:00)  POCT Blood Glucose.: 109 mg/dL (2022 06:46)  POCT Blood Glucose.: 106 mg/dL (2022 05:00)  POCT Blood Glucose.: 118 mg/dL (2022 04:33)  POCT Blood Glucose.: 80 mg/dL (2022 03:58)  POCT Blood Glucose.: 101 mg/dL (2022 02:47)  POCT Blood Glucose.: 106 mg/dL (2022 01:58)  POCT Blood Glucose.: 120 mg/dL (2022 00:53)  POCT Blood Glucose.: 140 mg/dL (2022 23:07)  POCT Blood Glucose.: 146 mg/dL (2022 21:59)  POCT Blood Glucose.: 150 mg/dL (2022 20:47)  POCT Blood Glucose.: 178 mg/dL (2022 20:05)  POCT Blood Glucose.: 152 mg/dL (2022 18:48)  POCT Blood Glucose.: 162 mg/dL (2022 18:02)            Drains:     MS         [  ] Drainage:                 L Pleural  [  ]  Drainage:                R Pleural  [  ]  Drainage:    Pacing Wires        [  ]   Settings:                                  Isolated  [  ]    Coumadin    [ ] YES          [  ]      NO                                   PHYSICAL EXAM        Neurology: alert and oriented x 3, nonfocal, no gross deficits  CV : s1 s2 RRR  Sternal Wound :  CDI , Stable  Lungs: cta  Abdomen: soft, nontender, nondistended, positive bowel sounds, last bowel movement                       chest tubes  :    voiding / shelton - sbd         Extremities:      edema   /  -   calve tenderness ,    L leg  /  R leg  incisions cdi          acetaminophen     Tablet .. 650 milliGRAM(s) Oral every 6 hours  aMIOdarone    Tablet 400 milliGRAM(s) Oral two times a day  ascorbic acid 500 milliGRAM(s) Oral two times a day  aspirin enteric coated 81 milliGRAM(s) Oral daily  cefTRIAXone   IVPB 2000 milliGRAM(s) IV Intermittent every 24 hours  chlorhexidine 2% Cloths 1 Application(s) Topical daily  enoxaparin Injectable 40 milliGRAM(s) SubCutaneous every 24 hours  gabapentin 100 milliGRAM(s) Oral every 8 hours  HYDROmorphone  Injectable 1 milliGRAM(s) IV Push every 4 hours PRN  insulin regular Infusion 3 Unit(s)/Hr IV Continuous <Continuous>  ketorolac   Injectable 15 milliGRAM(s) IV Push every 6 hours  metoclopramide Injectable 10 milliGRAM(s) IV Push every 8 hours  oxyCODONE    IR 5 milliGRAM(s) Oral every 4 hours PRN  oxyCODONE    IR 10 milliGRAM(s) Oral every 4 hours PRN  pantoprazole    Tablet 40 milliGRAM(s) Oral before breakfast  polyethylene glycol 3350 17 Gram(s) Oral daily  senna 2 Tablet(s) Oral at bedtime  sodium chloride 0.9%. 1000 milliLiter(s) IV Continuous <Continuous>  vancomycin  IVPB 1000 milliGRAM(s) IV Intermittent every 12 hours                    Physical Therapy Rec:   Home  [  ]   Home w/ PT  [  ]  Rehab  [  ]  Discussed with Cardiothoracic Team at AM rounds.     VITAL SIGNS    Telemetry:  nsr 90    Vital Signs Last 24 Hrs  T(C): 36.7 (22 @ 08:00), Max: 36.7 (22 @ 08:00)  T(F): 98.1 (22 @ 08:00), Max: 98.1 (22 @ 08:00)  HR: 82 (22 @ 11:59) (80 - 97)  BP: 123/79 (22 @ 11:59) (113/75 - 124/79)  RR: 20 (22 @ 13:00) (12 - 28)  SpO2: 95% (22 @ 13:00) (95% - 100%)                    @ 07:01  -   @ 07:00  --------------------------------------------------------  IN: 1747.6 mL / OUT: 1376 mL / NET: 371.6 mL     @ 07:01  -   @ 14:34  --------------------------------------------------------  IN: 403 mL / OUT: 185 mL / NET: 218 mL          Daily     Daily Weight in k.2 (2022 00:00)            CAPILLARY BLOOD GLUCOSE      POCT Blood Glucose.: 138 mg/dL (2022 13:38)  POCT Blood Glucose.: 111 mg/dL (2022 12:10)  POCT Blood Glucose.: 395 mg/dL (2022 12:09)  POCT Blood Glucose.: 140 mg/dL (2022 10:50)  POCT Blood Glucose.: 179 mg/dL (2022 09:46)  POCT Blood Glucose.: 215 mg/dL (2022 09:00)  POCT Blood Glucose.: 109 mg/dL (2022 06:46)  POCT Blood Glucose.: 106 mg/dL (2022 05:00)  POCT Blood Glucose.: 118 mg/dL (2022 04:33)  POCT Blood Glucose.: 80 mg/dL (2022 03:58)  POCT Blood Glucose.: 101 mg/dL (2022 02:47)  POCT Blood Glucose.: 106 mg/dL (2022 01:58)  POCT Blood Glucose.: 120 mg/dL (2022 00:53)  POCT Blood Glucose.: 140 mg/dL (2022 23:07)  POCT Blood Glucose.: 146 mg/dL (2022 21:59)  POCT Blood Glucose.: 150 mg/dL (2022 20:47)  POCT Blood Glucose.: 178 mg/dL (2022 20:05)  POCT Blood Glucose.: 152 mg/dL (2022 18:48)  POCT Blood Glucose.: 162 mg/dL (2022 18:02)            Drains:     MS         [ x ] Drainage:                 L Pleural  [x  ]  Drainage:                R Pleural  [ x ]  Drainage:    Pacing Wires    d/c this am]    Coumadin    [ ] YES          [x  ]      NO         , eventual eliquis                          PHYSICAL EXAM        Neurology: alert and oriented x 3, nonfocal, no gross deficits  CV : s1 s2 RRR  Sternal Wound :  CDI , Stable  Lungs: cta  Abdomen: soft, nontender, nondistended, positive bowel sounds, last bowel movement                      chest tubes , bulb, ss  :    voiding        Extremities:    trace   edema   /  -   calve tenderness ,            acetaminophen     Tablet .. 650 milliGRAM(s) Oral every 6 hours  aMIOdarone    Tablet 400 milliGRAM(s) Oral two times a day  ascorbic acid 500 milliGRAM(s) Oral two times a day  aspirin enteric coated 81 milliGRAM(s) Oral daily  cefTRIAXone   IVPB 2000 milliGRAM(s) IV Intermittent every 24 hours  chlorhexidine 2% Cloths 1 Application(s) Topical daily  enoxaparin Injectable 40 milliGRAM(s) SubCutaneous every 24 hours  gabapentin 100 milliGRAM(s) Oral every 8 hours  HYDROmorphone  Injectable 1 milliGRAM(s) IV Push every 4 hours PRN  insulin regular Infusion 3 Unit(s)/Hr IV Continuous <Continuous>  ketorolac   Injectable 15 milliGRAM(s) IV Push every 6 hours  metoclopramide Injectable 10 milliGRAM(s) IV Push every 8 hours  oxyCODONE    IR 5 milliGRAM(s) Oral every 4 hours PRN  oxyCODONE    IR 10 milliGRAM(s) Oral every 4 hours PRN  pantoprazole    Tablet 40 milliGRAM(s) Oral before breakfast  polyethylene glycol 3350 17 Gram(s) Oral daily  senna 2 Tablet(s) Oral at bedtime  sodium chloride 0.9%. 1000 milliLiter(s) IV Continuous <Continuous>  vancomycin  IVPB 1000 milliGRAM(s) IV Intermittent every 12 hours                    Physical Therapy Rec:   Home  [  ]   Home w/ PT  [  ]  Rehab  [  ]  Discussed with Cardiothoracic Team at AM rounds.

## 2022-11-23 NOTE — PROGRESS NOTE ADULT - SUBJECTIVE AND OBJECTIVE BOX
INFECTIOUS DISEASES FOLLOW UP-- Earnestine Zimmer  [phone]    This is a follow up note for this  32yFemale with  Shortness of breath  s/p VSD repair and MVR  transferred to [facility] down area        ROS:  CONSTITUTIONAL:  No fever, nausea  CARDIOVASCULAR:  No chest pain or palpitations  RESPIRATORY:  No dyspnea  GASTROINTESTINAL:  Notes nausea, no vomiting, diarrhea, or abdominal pain  GENITOURINARY:  No dysuria  NEUROLOGIC:  No headache,     Allergies    No Known Allergies    Intolerances        ANTIBIOTICS/RELEVANT:  antimicrobials  cefTRIAXone   IVPB 2000 milliGRAM(s) IV Intermittent every 24 hours  vancomycin  IVPB 1000 milliGRAM(s) IV Intermittent every 12 hours    immunologic:    OTHER:  acetaminophen     Tablet .. 650 milliGRAM(s) Oral every 6 hours  aMIOdarone    Tablet 400 milliGRAM(s) Oral two times a day  ascorbic acid 500 milliGRAM(s) Oral two times a day  aspirin enteric coated 81 milliGRAM(s) Oral daily  chlorhexidine 2% Cloths 1 Application(s) Topical daily  enoxaparin Injectable 40 milliGRAM(s) SubCutaneous every 24 hours  gabapentin 100 milliGRAM(s) Oral every 8 hours  HYDROmorphone  Injectable 1 milliGRAM(s) IV Push every 4 hours PRN  insulin lispro (ADMELOG) corrective regimen sliding scale   SubCutaneous Before meals and at bedtime  insulin regular Infusion 3 Unit(s)/Hr IV Continuous <Continuous>  ketorolac   Injectable 15 milliGRAM(s) IV Push every 6 hours  metoclopramide Injectable 10 milliGRAM(s) IV Push every 8 hours  oxyCODONE    IR 5 milliGRAM(s) Oral every 4 hours PRN  oxyCODONE    IR 10 milliGRAM(s) Oral every 4 hours PRN  pantoprazole    Tablet 40 milliGRAM(s) Oral before breakfast  polyethylene glycol 3350 17 Gram(s) Oral daily  senna 2 Tablet(s) Oral at bedtime  sodium chloride 0.9%. 1000 milliLiter(s) IV Continuous <Continuous>      Objective:  Vital Signs Last 24 Hrs  T(C): 36.7 (23 Nov 2022 14:58), Max: 36.7 (23 Nov 2022 08:00)  T(F): 98 (23 Nov 2022 14:58), Max: 98.1 (23 Nov 2022 08:00)  HR: 78 (23 Nov 2022 15:22) (78 - 97)  BP: 138/85 (23 Nov 2022 15:22) (113/75 - 138/85)  BP(mean): 94 (23 Nov 2022 14:58) (89 - 97)  RR: 18 (23 Nov 2022 14:58) (12 - 28)  SpO2: 97% (23 Nov 2022 15:22) (95% - 100%)    Parameters below as of 23 Nov 2022 15:22  Patient On (Oxygen Delivery Method): room air        PHYSICAL EXAM:  Constitutional:no acute distress  Eyes:JAMIE, EOMI  Ear/Nose/Throat: no oral lesions, left IJ CVC	  Respiratory: clear BL  Cardiovascular: S1S2  bulb drain chest tubes in place  Gastrointestinal:soft, (+) BS, no tenderness  Extremities:no e/e/c  No Lymphadenopathy  IV sites not inflammed.    LABS:                        10.8   16.05 )-----------( 331      ( 23 Nov 2022 01:06 )             33.2     11-23    138  |  102  |  12  ----------------------------<  123<H>  3.9   |  23  |  0.86    Ca    9.4      23 Nov 2022 01:06  Phos  5.4     11-23  Mg     2.8     11-23    TPro  7.0  /  Alb  3.8  /  TBili  0.9  /  DBili  x   /  AST  76<H>  /  ALT  59<H>  /  AlkPhos  89  11-23    PT/INR - ( 23 Nov 2022 01:06 )   PT: 13.6 sec;   INR: 1.18 ratio         PTT - ( 23 Nov 2022 01:06 )  PTT:28.8 sec      MICROBIOLOGY:  Culture Results:   Testing in progress (11-22 @ 21:23)            RECENT CULTURES:  11-22 @ 21:23  .Tissue Other  --  --  --    Testing in progress  --  11-18 @ 06:15  .Blood Blood-Peripheral  --  --  --    No Growth Final  --  11-18 @ 06:10  .Blood Blood-Peripheral  --  --  --    No Growth Final  --      RADIOLOGY & ADDITIONAL STUDIES:    < from: Xray Chest 1 View- PORTABLE-Routine (11.23.22 @ 03:36) >    IMPRESSION:  Interval development improvement of mild pulmonary edema.  Slight blunting of the left costophrenic angle may represent a small   pleural effusion.    < end of copied text >

## 2022-11-23 NOTE — PROGRESS NOTE ADULT - ASSESSMENT
32 year old with history of unrepaired VSD presents with sob, cough , hemoptysis , fever, fatigue to [facility]. 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 She has no dental problems and no recent dental work except for replacement of a cap months ago   Denies HA, change i mental status  Feels better since ab started but having very bad burning fro m the pcn infusion.   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 VSD/MR    She underwent repair of her VSD and replacement of the MV for her gemella endocarditis   she is now awake, alert, extubated    For periop purposes x 48hrs would give Vancomycin plus Ceftriaxone 2gm/day  After 48hours would continue Ceftriaxone 2gm/day plus low dose gentamicin to maintain a trough <.5ucg/ml    discussed with 2C team    Ez Zimmer MD  Can be called via Teams  After 5pm/weekends [phone]

## 2022-11-23 NOTE — PROGRESS NOTE ADULT - SUBJECTIVE AND OBJECTIVE BOX
CRITICAL CARE ATTENDING - CTICU    MEDICATIONS  (STANDING):  acetaminophen     Tablet .. 650 milliGRAM(s) Oral every 6 hours  aMIOdarone    Tablet 400 milliGRAM(s) Oral two times a day  ascorbic acid 500 milliGRAM(s) Oral two times a day  aspirin enteric coated 81 milliGRAM(s) Oral daily  cefTRIAXone   IVPB 2000 milliGRAM(s) IV Intermittent every 24 hours  chlorhexidine 2% Cloths 1 Application(s) Topical daily  DOBUTamine Infusion 2.5 MICROgram(s)/kG/Min (4.78 mL/Hr) IV Continuous <Continuous>  gabapentin 100 milliGRAM(s) Oral every 8 hours  HYDROmorphone  Injectable 1 milliGRAM(s) IV Push every 6 hours  insulin regular Infusion 3 Unit(s)/Hr (3 mL/Hr) IV Continuous <Continuous>  ketorolac   Injectable 15 milliGRAM(s) IV Push every 6 hours  metoclopramide Injectable 10 milliGRAM(s) IV Push every 8 hours  pantoprazole    Tablet 40 milliGRAM(s) Oral before breakfast  polyethylene glycol 3350 17 Gram(s) Oral daily  senna 2 Tablet(s) Oral at bedtime  sodium chloride 0.9%. 1000 milliLiter(s) (10 mL/Hr) IV Continuous <Continuous>  vancomycin  IVPB 1000 milliGRAM(s) IV Intermittent every 12 hours                                    10.8   16.05 )-----------( 331      ( 2022 01:06 )             33.2           138  |  102  |  12  ----------------------------<  123<H>  3.9   |  23  |  0.86    Ca    9.4      2022 01:06  Phos  5.4       Mg     2.8         TPro  7.0  /  Alb  3.8  /  TBili  0.9  /  DBili  x   /  AST  76<H>  /  ALT  59<H>  /  AlkPhos  89        PT/INR - ( 2022 01:06 )   PT: 13.6 sec;   INR: 1.18 ratio         PTT - ( 2022 01:06 )  PTT:28.8 sec    Mode: CPAP with PS  FiO2: 50  PEEP: 5  PS: 10  MAP: 10  PIP: 21      Daily Height in cm: 160.02 (2022 08:09)    Daily Weight in k.2 (2022 00:00)       @ 07:  -   @ 07:00  --------------------------------------------------------  IN: 593.5 mL / OUT: 0 mL / NET: 593.5 mL     @ 07:  -   @ 06:02  --------------------------------------------------------  IN: 1706.7 mL / OUT: 1231 mL / NET: 475.7 mL        Critically Ill patient  : [ ] preoperative ,   [ x] post operative    Requires :  [ x] Arterial Line   [ x] Central Line  [ ] PA catheter  [ ] IABP  [ ] ECMO  [ ] LVAD  [ ] Ventilator  [x ] pacemaker-TPM [ ] Impella.                      [x ] ABG's     [x ] Pulse Oxymetry Monitoring  Bedside evaluation , monitoring , treatment of hemodynamics , fluids , IVP/ IVCD meds.        Diagnosis:   POD 1- MVR (31 mm Epic tissue valve), VSD repair     Hypovolemia     MV Endocarditis     Leukocytosis     Hemodynamic lability,  instability. Requires IVCD [ ] vasopressors [x ] inotropes  [ ] vasodilator  [ ]IVSS fluid  to maintain MAP, perfusion, C.I.     Chest Tube Drainage/ Management     Temporary pacemaker (TPM) interrogation and setting.     Requires chest PT, pulmonary toilet, suctioning to maintain SaO2,  patent airway and treat atelectasis.     CHF- acute [ x]   chronic [ ]    systolic [x ]   diatolic [ ]          - Echo- EF - 60-70%            [x ] RV dysfunction          - Cxr-cardiomegally, edema          - Clinical-  [x ]inotropes   [ ]pressors   [ ]diuresis   [ ]IABP   [ ]ECMO   [ ]LVAD   [ ]Respiratory Failure.     IVCD Gordon Wiseman, personally performed the services described in this documentation. All medical record entries made by the scribe were at my direction and in my presence. I have reviewed the chart and agree that the record reflects my personal performance and is accurate and complete.   Gordon He MD.       By signing my name below, I, Austin Kris, attest that this documentation has been prepared under the direction and in the presence of Gordon He MD.   Electronically Signed: Lilian Gillespie 22 @ 06:02        Discussed with CT surgeon, Physician Assistant - Nurse Practitioner- Critical care medicine team.   Dicussed at  AM / PM rounds.   Chart, labs , films reviewed.    Total Time:  CRITICAL CARE ATTENDING - CTICU    MEDICATIONS  (STANDING):  acetaminophen     Tablet .. 650 milliGRAM(s) Oral every 6 hours  aMIOdarone    Tablet 400 milliGRAM(s) Oral two times a day  ascorbic acid 500 milliGRAM(s) Oral two times a day  aspirin enteric coated 81 milliGRAM(s) Oral daily  cefTRIAXone   IVPB 2000 milliGRAM(s) IV Intermittent every 24 hours  chlorhexidine 2% Cloths 1 Application(s) Topical daily  DOBUTamine Infusion 2.5 MICROgram(s)/kG/Min (4.78 mL/Hr) IV Continuous <Continuous>  gabapentin 100 milliGRAM(s) Oral every 8 hours  HYDROmorphone  Injectable 1 milliGRAM(s) IV Push every 6 hours  insulin regular Infusion 3 Unit(s)/Hr (3 mL/Hr) IV Continuous <Continuous>  ketorolac   Injectable 15 milliGRAM(s) IV Push every 6 hours  metoclopramide Injectable 10 milliGRAM(s) IV Push every 8 hours  pantoprazole    Tablet 40 milliGRAM(s) Oral before breakfast  polyethylene glycol 3350 17 Gram(s) Oral daily  senna 2 Tablet(s) Oral at bedtime  sodium chloride 0.9%. 1000 milliLiter(s) (10 mL/Hr) IV Continuous <Continuous>  vancomycin  IVPB 1000 milliGRAM(s) IV Intermittent every 12 hours                                    10.8   16.05 )-----------( 331      ( 2022 01:06 )             33.2           138  |  102  |  12  ----------------------------<  123<H>  3.9   |  23  |  0.86    Ca    9.4      2022 01:06  Phos  5.4       Mg     2.8         TPro  7.0  /  Alb  3.8  /  TBili  0.9  /  DBili  x   /  AST  76<H>  /  ALT  59<H>  /  AlkPhos  89        PT/INR - ( 2022 01:06 )   PT: 13.6 sec;   INR: 1.18 ratio         PTT - ( 2022 01:06 )  PTT:28.8 sec    Mode: CPAP with PS  FiO2: 50  PEEP: 5  PS: 10  MAP: 10  PIP: 21      Daily Height in cm: 160.02 (2022 08:09)    Daily Weight in k.2 (2022 00:00)       @ 07:  -   @ 07:00  --------------------------------------------------------  IN: 593.5 mL / OUT: 0 mL / NET: 593.5 mL     @ 07:  -   @ 06:02  --------------------------------------------------------  IN: 1706.7 mL / OUT: 1231 mL / NET: 475.7 mL        Critically Ill patient  : [ ] preoperative ,   [ x] post operative    Requires :  [ x] Arterial Line   [ x] Central Line  [ ] PA catheter  [ ] IABP  [ ] ECMO  [ ] LVAD  [ ] Ventilator  [x ] pacemaker-TPM [ ] Impella.                      [x ] ABG's     [x ] Pulse Oxymetry Monitoring  Bedside evaluation , monitoring , treatment of hemodynamics , fluids , IVP/ IVCD meds.        Diagnosis:     POD 1- MVR (31 mm Epic tissue valve), VSD repair     Hypovolemia     MV Endocarditis     Leukocytosis     Hemodynamic lability,  instability. Requires IVCD [ ] vasopressors [x ] inotropes  [ ] vasodilator  [ ]IVSS fluid  to maintain MAP, perfusion, C.I.     AM dobutrex wean     Chest Tube Drainage/ Management     Temporary pacemaker (TPM) interrogation and setting.     Requires chest PT, pulmonary toilet, suctioning to maintain SaO2,  patent airway and treat atelectasis.     CHF- acute [ x]   chronic [x ]    systolic [x ]   diatolic [ ]          - Echo- EF - 60-70%            [x ] RV dysfunction          - Cxr-cardiomegally, edema          - Clinical-  [x ]inotropes   [ ]pressors   [ ]diuresis   [ ]IABP   [ ]ECMO   [ ]LVAD   [ ]Respiratory Failure.     IVCD Insulin         Gordon ERAZO, personally performed the services described in this documentation. All medical record entries made by the scribe were at my direction and in my presence. I have reviewed the chart and agree that the record reflects my personal performance and is accurate and complete.   Gordon He MD.       By signing my name below, I, Austin Ponce, attest that this documentation has been prepared under the direction and in the presence of Gordon He MD.   Electronically Signed: Lilian Gillespie 22 @ 06:02        Discussed with CT surgeon, Physician Assistant - Nurse Practitioner- Critical care medicine team.   Dicussed at  AM / PM rounds.   Chart, labs , films reviewed.    Total Time:  30 min

## 2022-11-23 NOTE — OCCUPATIONAL THERAPY INITIAL EVALUATION ADULT - PERTINENT HX OF CURRENT PROBLEM, REHAB EVAL
32 year old with history of unrepaired VSD presents with sob, cough , hemoptysis , fever, fatigue to [facility]. 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 She has no dental problems and no recent dental work except for replacement of a cap months ago     S/p MVR 11/22 31 yo F w PMH of congenital VSD (uncorrected) presents to [facility] C[facility] for 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 Initially patient presented to the [facility] ED w chronic cough, SOB, hematemesis & hemoptysis. Pt complained of  chronic cough for the past 2 months, initially non-productive, now slightly productive associated with SOB. Initially she was able to climb one flight of stairs, but the SOB & cough have worsened over the past ~3 weeks, now claims that she would be short of breath if she walked 5ft She was seen by her PCP & pulmonologist for the cough & sob and was advised to use inhalers and azithromycin with no relief. Wednesday night 11/9 into Thursday AM 11/10 her cough worsened and also had an episode of vomiting, found 10-15 ml of blood mixed with the vomitus. Her cough episode later showed sputum w blood streaks. Claims that this was new, and never happened in the past 2 months. Patient was admited to [facility]. Patient was found to be in Acute HF and an echo was done showing Severe mitral valve regurgitation. Patient was then transferred to [facility] to 11/14 under Dr. Denson for Evaluation of MV regurgitation/endocarditis and VSD. Patient currently admits to coughing that has only been helped by Lasix that was being administered at [facility].  most Recent travel in may to [facility] currently denies Chest pain, fevers, chills, diarrhea, headaches, urinary or bowel changes.    S/p MVR 11/22  Chest x-ray 11/23: Interval development improvement of mild pulmonary edema. Slight blunting of the left costophrenic angle may represent a small   pleural effusion.

## 2022-11-23 NOTE — PHYSICAL THERAPY INITIAL EVALUATION ADULT - ACTIVE RANGE OF MOTION EXAMINATION, REHAB EVAL
07/25/22                Cris Gato, [address]   [place] [address]                   In our system Dr. Marleni Faustin is shown to be your primary care provider, you are due for an Annual Wellness Visit. Can you please call our office to schedule appointment. If you are being evaluated by another provider, would you so graciously call our office at [phone] to inform our office of this change so we can update our records. Thank you for your time,       Eulalia HEIN
bilateral upper extremity Active ROM was WFL (within functional limits)/bilateral  lower extremity Active ROM was WFL (within functional limits)
bilateral upper extremity Active ROM was WFL (within functional limits)/bilateral  lower extremity Active ROM was WFL (within functional limits)

## 2022-11-23 NOTE — PHYSICAL THERAPY INITIAL EVALUATION ADULT - CRITERIA FOR SKILLED THERAPEUTIC INTERVENTIONS
Pt with no impairments or functional limitations at this time
impairments found/functional limitations in following categories

## 2022-11-23 NOTE — OCCUPATIONAL THERAPY INITIAL EVALUATION ADULT - RANGE OF MOTION EXAMINATION, UPPER EXTREMITY
Except b/l shoulder flex limited to 90 degrees 2/2 sternal precautions/bilateral UE Active ROM was WFL  (within functional limits)

## 2022-11-23 NOTE — OCCUPATIONAL THERAPY INITIAL EVALUATION ADULT - LIVES WITH, PROFILE
Per pt report, lives with boyfriend in pvt house, +7STE, +1 flight within however able to remain on first floor set up. Boyfriend is home and available to assist pt with ADL/mobility

## 2022-11-23 NOTE — OCCUPATIONAL THERAPY INITIAL EVALUATION ADULT - GENERAL OBSERVATIONS, REHAB EVAL
Pt received sitting in chair, +IV, +ICU monitoring, +central line Pt received sitting in chair, +IV, +ICU monitoring

## 2022-11-23 NOTE — PROGRESS NOTE ADULT - ASSESSMENT
31 yo F w PMH of congenital VSD (uncorrected) presents to [facility] C[facility] for 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 Initially patient presented to the [facility] ED w chronic cough, SOB, hematemesis & hemoptysis. Pt complained of  chronic cough for the past 2 months, initially non-productive, now slightly productive associated with SOB. Initially she was able to climb one flight of stairs, but the SOB & cough have worsened over the past 3 weeks, now claims that she would be short of breath if she walked 5ft She was seen by her PCP & pulmonologist for the cough & sob and was advised to use inhalers and azithromycin with no relief. Wednesda night 11/9 into Thursday AM 11/10 her cough worsened and also had an episode of vomiting, found 10-15 ml of blood mixed with the vomitus. Her cough episode later showed sputum w blood streaks. Claims that this was new, and never happened in the past 2 months. Patient was admited to [facility]. Patient was found to be in Acute HF and an echo was done showing Severe mitral valve regurgitation and . Patient was then transferred to [facility] to 11/14 under Dr. Denson for Evaluation of MV regurgitation/endocarditis and VSD. Patient currently admits to coughing that has only been helped by Lasix that was being administered at [facility].  most Recent travel in may to [facility] currently denies Chest pain, fevers, chills, diarrhea, headaches, urinary or bowel changes.11/21 PREOP for AM 11/22.  Continue ceftriaxone / Gent ( last dose tonight per ID) / + Vanc x1 on call to OR Tuesday. NPO after MN. COVID pcr sent this AM /neg.   11/22/22 Mitral valve vegetations resected and replaced with 31mm tissue epic valve. VSD closed primarily    eXTUBATED D 0  prophylactic vanco , then resume  ceftriaxone and Gentamycin  Hyopoxia, likeley pain related, pain management  D/c MS chest tubes as drainage decreases.  iNSULIN  INFUSION  Johnson out , DTV  Plan to place PICC then start eliquis

## 2022-11-23 NOTE — OCCUPATIONAL THERAPY INITIAL EVALUATION ADULT - DIAGNOSIS, OT EVAL
Pt presents with decreased functional endurance however not impacting ability to perform ADL/mobility

## 2022-11-23 NOTE — PHYSICAL THERAPY INITIAL EVALUATION ADULT - LIVES WITH, PROFILE
Boyfriend in private house - 1 flight of steps inside
Per pt report, lives with boyfriend in pvt house, +7STE, +1 flight within however able to remain on first floor set up. Boyfriend is home and available to assist pt with ADL/mobility

## 2022-11-24 LAB
ALBUMIN SERPL ELPH-MCNC: 3.5 G/DL — SIGNIFICANT CHANGE UP (ref 3.3–5)
ALP SERPL-CCNC: 83 U/L — SIGNIFICANT CHANGE UP (ref 40–120)
ALT FLD-CCNC: 41 U/L — SIGNIFICANT CHANGE UP (ref 10–45)
ANION GAP SERPL CALC-SCNC: 11 MMOL/L — SIGNIFICANT CHANGE UP (ref 5–17)
AST SERPL-CCNC: 35 U/L — SIGNIFICANT CHANGE UP (ref 10–40)
BILIRUB SERPL-MCNC: 0.6 MG/DL — SIGNIFICANT CHANGE UP (ref 0.2–1.2)
BUN SERPL-MCNC: 24 MG/DL — HIGH (ref 7–23)
CALCIUM SERPL-MCNC: 9.1 MG/DL — SIGNIFICANT CHANGE UP (ref 8.4–10.5)
CHLORIDE SERPL-SCNC: 99 MMOL/L — SIGNIFICANT CHANGE UP (ref 96–108)
CO2 SERPL-SCNC: 24 MMOL/L — SIGNIFICANT CHANGE UP (ref 22–31)
CREAT SERPL-MCNC: 1.11 MG/DL — SIGNIFICANT CHANGE UP (ref 0.5–1.3)
EGFR: 67 ML/MIN/1.73M2 — SIGNIFICANT CHANGE UP
GLUCOSE BLDC GLUCOMTR-MCNC: 104 MG/DL — HIGH (ref 70–99)
GLUCOSE BLDC GLUCOMTR-MCNC: 111 MG/DL — HIGH (ref 70–99)
GLUCOSE BLDC GLUCOMTR-MCNC: 156 MG/DL — HIGH (ref 70–99)
GLUCOSE BLDC GLUCOMTR-MCNC: 99 MG/DL — SIGNIFICANT CHANGE UP (ref 70–99)
GLUCOSE SERPL-MCNC: 99 MG/DL — SIGNIFICANT CHANGE UP (ref 70–99)
HCT VFR BLD CALC: 32.9 % — LOW (ref 34.5–45)
HGB BLD-MCNC: 10.4 G/DL — LOW (ref 11.5–15.5)
MCHC RBC-ENTMCNC: 26.9 PG — LOW (ref 27–34)
MCHC RBC-ENTMCNC: 31.6 GM/DL — LOW (ref 32–36)
MCV RBC AUTO: 85.2 FL — SIGNIFICANT CHANGE UP (ref 80–100)
NRBC # BLD: 0 /100 WBCS — SIGNIFICANT CHANGE UP (ref 0–0)
PLATELET # BLD AUTO: 307 K/UL — SIGNIFICANT CHANGE UP (ref 150–400)
POTASSIUM SERPL-MCNC: 4.9 MMOL/L — SIGNIFICANT CHANGE UP (ref 3.5–5.3)
POTASSIUM SERPL-SCNC: 4.9 MMOL/L — SIGNIFICANT CHANGE UP (ref 3.5–5.3)
PROT SERPL-MCNC: 6.8 G/DL — SIGNIFICANT CHANGE UP (ref 6–8.3)
RBC # BLD: 3.86 M/UL — SIGNIFICANT CHANGE UP (ref 3.8–5.2)
RBC # FLD: 16.1 % — HIGH (ref 10.3–14.5)
SODIUM SERPL-SCNC: 134 MMOL/L — LOW (ref 135–145)
WBC # BLD: 19.78 K/UL — HIGH (ref 3.8–10.5)
WBC # FLD AUTO: 19.78 K/UL — HIGH (ref 3.8–10.5)

## 2022-11-24 PROCEDURE — 71045 X-RAY EXAM CHEST 1 VIEW: CPT | Mod: 26,76

## 2022-11-24 RX ORDER — APIXABAN 2.5 MG/1
2.5 TABLET, FILM COATED ORAL
Refills: 0 | Status: DISCONTINUED | OUTPATIENT
Start: 2022-11-24 | End: 2022-11-26

## 2022-11-24 RX ADMIN — Medication 650 MILLIGRAM(S): at 11:49

## 2022-11-24 RX ADMIN — Medication 15 MILLIGRAM(S): at 21:15

## 2022-11-24 RX ADMIN — OXYCODONE HYDROCHLORIDE 10 MILLIGRAM(S): 5 TABLET ORAL at 12:40

## 2022-11-24 RX ADMIN — Medication 15 MILLIGRAM(S): at 05:04

## 2022-11-24 RX ADMIN — Medication 15 MILLIGRAM(S): at 15:37

## 2022-11-24 RX ADMIN — SENNA PLUS 2 TABLET(S): 8.6 TABLET ORAL at 21:25

## 2022-11-24 RX ADMIN — OXYCODONE HYDROCHLORIDE 5 MILLIGRAM(S): 5 TABLET ORAL at 18:29

## 2022-11-24 RX ADMIN — Medication 1: at 17:12

## 2022-11-24 RX ADMIN — OXYCODONE HYDROCHLORIDE 10 MILLIGRAM(S): 5 TABLET ORAL at 12:10

## 2022-11-24 RX ADMIN — ENOXAPARIN SODIUM 40 MILLIGRAM(S): 100 INJECTION SUBCUTANEOUS at 11:50

## 2022-11-24 RX ADMIN — HYDROMORPHONE HYDROCHLORIDE 1 MILLIGRAM(S): 2 INJECTION INTRAMUSCULAR; INTRAVENOUS; SUBCUTANEOUS at 03:29

## 2022-11-24 RX ADMIN — GABAPENTIN 100 MILLIGRAM(S): 400 CAPSULE ORAL at 05:05

## 2022-11-24 RX ADMIN — CEFTRIAXONE 100 MILLIGRAM(S): 500 INJECTION, POWDER, FOR SOLUTION INTRAMUSCULAR; INTRAVENOUS at 15:37

## 2022-11-24 RX ADMIN — PANTOPRAZOLE SODIUM 40 MILLIGRAM(S): 20 TABLET, DELAYED RELEASE ORAL at 05:04

## 2022-11-24 RX ADMIN — HYDROMORPHONE HYDROCHLORIDE 1 MILLIGRAM(S): 2 INJECTION INTRAMUSCULAR; INTRAVENOUS; SUBCUTANEOUS at 02:59

## 2022-11-24 RX ADMIN — Medication 500 MILLIGRAM(S): at 17:36

## 2022-11-24 RX ADMIN — AMIODARONE HYDROCHLORIDE 400 MILLIGRAM(S): 400 TABLET ORAL at 17:36

## 2022-11-24 RX ADMIN — GABAPENTIN 100 MILLIGRAM(S): 400 CAPSULE ORAL at 14:23

## 2022-11-24 RX ADMIN — CHLORHEXIDINE GLUCONATE 1 APPLICATION(S): 213 SOLUTION TOPICAL at 08:15

## 2022-11-24 RX ADMIN — Medication 15 MILLIGRAM(S): at 20:29

## 2022-11-24 RX ADMIN — OXYCODONE HYDROCHLORIDE 5 MILLIGRAM(S): 5 TABLET ORAL at 19:29

## 2022-11-24 RX ADMIN — APIXABAN 2.5 MILLIGRAM(S): 2.5 TABLET, FILM COATED ORAL at 17:36

## 2022-11-24 RX ADMIN — Medication 650 MILLIGRAM(S): at 05:05

## 2022-11-24 RX ADMIN — Medication 500 MILLIGRAM(S): at 05:05

## 2022-11-24 RX ADMIN — Medication 15 MILLIGRAM(S): at 08:15

## 2022-11-24 RX ADMIN — AMIODARONE HYDROCHLORIDE 400 MILLIGRAM(S): 400 TABLET ORAL at 05:05

## 2022-11-24 RX ADMIN — Medication 81 MILLIGRAM(S): at 08:15

## 2022-11-24 RX ADMIN — GABAPENTIN 100 MILLIGRAM(S): 400 CAPSULE ORAL at 21:25

## 2022-11-24 RX ADMIN — Medication 650 MILLIGRAM(S): at 17:36

## 2022-11-24 NOTE — PROGRESS NOTE ADULT - SUBJECTIVE AND OBJECTIVE BOX
VITAL SIGNS    Telemetry:  nsr 68    Vital Signs Last 24 Hrs  T(C): 36.4 (22 @ 02:53), Max: 36.8 (22 @ 23:15)  T(F): 97.6 (22 @ 02:53), Max: 98.3 (22 @ 23:15)  HR: 70 (22 @ 02:53) (70 - 82)  BP: 129/79 (22 @ 02:53) (117/80 - 138/85)  RR: 18 (22 @ 02:53) (18 - 22)  SpO2: 99% (22 @ 02:53) (95% - 100%)                    @ 07:01  -   @ 07:00  --------------------------------------------------------  IN: 523 mL / OUT: 790 mL / NET: -267 mL     @ 07:01  -   @ 11:19  --------------------------------------------------------  IN: 0 mL / OUT: 70 mL / NET: -70 mL          Daily     Daily Weight in k.3 (2022 09:42)            CAPILLARY BLOOD GLUCOSE      POCT Blood Glucose.: 99 mg/dL (2022 07:47)  POCT Blood Glucose.: 116 mg/dL (2022 20:42)  POCT Blood Glucose.: 158 mg/dL (2022 16:42)  POCT Blood Glucose.: 82 mg/dL (2022 14:58)  POCT Blood Glucose.: 138 mg/dL (2022 13:38)  POCT Blood Glucose.: 111 mg/dL (2022 12:10)  POCT Blood Glucose.: 395 mg/dL (2022 12:09)            Drains:     MS         [  ] Drainage:                 L Pleural  [ x ]  Drainage: 50               R Pleural  [ x ]  Drainage:65/125    Pacing Wires         Coumadin    [ ] YES          [x]      NO         eliquis                          PHYSICAL EXAM        Neurology: alert and oriented x 3, nonfocal, no gross deficits  CV : s1 s2 RRR  Sternal Wound :  CDI , Stable  Lungs: cta  Abdomen: soft, nontender, nondistended, positive bowel sounds, last bowel movement                       chest tubes x 2  :    voiding     Extremities:   -   edema   /  -   calve tenderness ,             acetaminophen     Tablet .. 650 milliGRAM(s) Oral every 6 hours  aMIOdarone    Tablet 400 milliGRAM(s) Oral two times a day  ascorbic acid 500 milliGRAM(s) Oral two times a day  aspirin enteric coated 81 milliGRAM(s) Oral daily  bisacodyl Suppository 10 milliGRAM(s) Rectal once  cefTRIAXone   IVPB 2000 milliGRAM(s) IV Intermittent every 24 hours  chlorhexidine 2% Cloths 1 Application(s) Topical daily  enoxaparin Injectable 40 milliGRAM(s) SubCutaneous every 24 hours  gabapentin 100 milliGRAM(s) Oral every 8 hours  HYDROmorphone  Injectable 1 milliGRAM(s) IV Push every 4 hours PRN  insulin lispro (ADMELOG) corrective regimen sliding scale   SubCutaneous Before meals and at bedtime  insulin regular Infusion 3 Unit(s)/Hr IV Continuous <Continuous>  ketorolac   Injectable 15 milliGRAM(s) IV Push every 6 hours  oxyCODONE    IR 5 milliGRAM(s) Oral every 4 hours PRN  oxyCODONE    IR 10 milliGRAM(s) Oral every 4 hours PRN  pantoprazole    Tablet 40 milliGRAM(s) Oral before breakfast  polyethylene glycol 3350 17 Gram(s) Oral daily  senna 2 Tablet(s) Oral at bedtime  sodium chloride 0.9%. 1000 milliLiter(s) IV Continuous <Continuous>                    Physical Therapy Rec:   Home  [  ]   Home w/ PT  [  ]  Rehab  [  ]  Discussed with Cardiothoracic Team at AM rounds.

## 2022-11-24 NOTE — PROGRESS NOTE ADULT - ASSESSMENT
33 yo F w PMH of congenital VSD (uncorrected) presents to 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 Initially patient presented to the [facility] ED w chronic cough, SOB, hematemesis & hemoptysis. Pt complained of  chronic cough for the past 2 months, initially non-productive, now slightly productive associated with SOB. Initially she was able to climb one flight of stairs, but the SOB & cough have worsened over the past 3 weeks, now claims that she would be short of breath if she walked 5ft She was seen by her PCP & pulmonologist for the cough & sob and was advised to use inhalers and azithromycin with no relief. Wednesda night 11/9 into Thursday AM 11/10 her cough worsened and also had an episode of vomiting, found 10-15 ml of blood mixed with the vomitus. Her cough episode later showed sputum w blood streaks. Claims that this was new, and never happened in the past 2 months. Patient was admited to [facility]. Patient was found to be in Acute HF and an echo was done showing Severe mitral valve regurgitation and . Patient was then transferred to [facility] to 11/14 under Dr. Denson for Evaluation of MV regurgitation/endocarditis and VSD. Patient currently admits to coughing that has only been helped by Lasix that was being administered at [facility].  most Recent travel in may to [facility] currently denies Chest pain, fevers, chills, diarrhea, headaches, urinary or bowel changes.11/21 PREOP for AM 11/22.  Continue ceftriaxone / Gent ( last dose tonight per ID) / + Vanc x1 on call to OR Tuesday. NPO after MN. COVID pcr sent this AM /neg.   11/22/22 Mitral valve vegetations resected and replaced with 31mm tissue epic valve. VSD closed primarily    eXTUBATED D 0  prophylactic vanco , then resume  ceftriaxone and Gentamycin  Hyopoxia, likeley pain related, pain management  D/c MS chest tubes as drainage decreases.  iNSULIN  INFUSION  Johnson out , DTV  Plan to place PICC then start eliquis  11/23 transferred to [facility]  11/24 R ct with drainage will leave in place.  L ct minimal, plan to d/c today.  Picc placed

## 2022-11-25 DIAGNOSIS — Z95.2 PRESENCE OF PROSTHETIC HEART VALVE: ICD-10-CM

## 2022-11-25 LAB
ALBUMIN SERPL ELPH-MCNC: 3 G/DL — LOW (ref 3.3–5)
ALP SERPL-CCNC: 83 U/L — SIGNIFICANT CHANGE UP (ref 40–120)
ALT FLD-CCNC: 38 U/L — SIGNIFICANT CHANGE UP (ref 10–45)
ANION GAP SERPL CALC-SCNC: 9 MMOL/L — SIGNIFICANT CHANGE UP (ref 5–17)
AST SERPL-CCNC: 34 U/L — SIGNIFICANT CHANGE UP (ref 10–40)
BILIRUB SERPL-MCNC: 0.3 MG/DL — SIGNIFICANT CHANGE UP (ref 0.2–1.2)
BUN SERPL-MCNC: 28 MG/DL — HIGH (ref 7–23)
CALCIUM SERPL-MCNC: 8.8 MG/DL — SIGNIFICANT CHANGE UP (ref 8.4–10.5)
CHLORIDE SERPL-SCNC: 101 MMOL/L — SIGNIFICANT CHANGE UP (ref 96–108)
CO2 SERPL-SCNC: 24 MMOL/L — SIGNIFICANT CHANGE UP (ref 22–31)
CREAT SERPL-MCNC: 0.89 MG/DL — SIGNIFICANT CHANGE UP (ref 0.5–1.3)
EGFR: 88 ML/MIN/1.73M2 — SIGNIFICANT CHANGE UP
GLUCOSE BLDC GLUCOMTR-MCNC: 114 MG/DL — HIGH (ref 70–99)
GLUCOSE BLDC GLUCOMTR-MCNC: 92 MG/DL — SIGNIFICANT CHANGE UP (ref 70–99)
GLUCOSE BLDC GLUCOMTR-MCNC: 94 MG/DL — SIGNIFICANT CHANGE UP (ref 70–99)
GLUCOSE BLDC GLUCOMTR-MCNC: 95 MG/DL — SIGNIFICANT CHANGE UP (ref 70–99)
GLUCOSE SERPL-MCNC: 94 MG/DL — SIGNIFICANT CHANGE UP (ref 70–99)
HCT VFR BLD CALC: 33 % — LOW (ref 34.5–45)
HGB BLD-MCNC: 10.4 G/DL — LOW (ref 11.5–15.5)
MAGNESIUM SERPL-MCNC: 2.5 MG/DL — SIGNIFICANT CHANGE UP (ref 1.6–2.6)
MCHC RBC-ENTMCNC: 26.7 PG — LOW (ref 27–34)
MCHC RBC-ENTMCNC: 31.5 GM/DL — LOW (ref 32–36)
MCV RBC AUTO: 84.8 FL — SIGNIFICANT CHANGE UP (ref 80–100)
NRBC # BLD: 0 /100 WBCS — SIGNIFICANT CHANGE UP (ref 0–0)
PLATELET # BLD AUTO: 325 K/UL — SIGNIFICANT CHANGE UP (ref 150–400)
POTASSIUM SERPL-MCNC: 4.6 MMOL/L — SIGNIFICANT CHANGE UP (ref 3.5–5.3)
POTASSIUM SERPL-SCNC: 4.6 MMOL/L — SIGNIFICANT CHANGE UP (ref 3.5–5.3)
PROT SERPL-MCNC: 6.2 G/DL — SIGNIFICANT CHANGE UP (ref 6–8.3)
RBC # BLD: 3.89 M/UL — SIGNIFICANT CHANGE UP (ref 3.8–5.2)
RBC # FLD: 15.6 % — HIGH (ref 10.3–14.5)
SODIUM SERPL-SCNC: 134 MMOL/L — LOW (ref 135–145)
WBC # BLD: 16.05 K/UL — HIGH (ref 3.8–10.5)
WBC # FLD AUTO: 16.05 K/UL — HIGH (ref 3.8–10.5)

## 2022-11-25 PROCEDURE — 99232 SBSQ HOSP IP/OBS MODERATE 35: CPT

## 2022-11-25 PROCEDURE — 71045 X-RAY EXAM CHEST 1 VIEW: CPT | Mod: 26,76

## 2022-11-25 RX ORDER — GENTAMICIN SULFATE 40 MG/ML
60 VIAL (ML) INJECTION EVERY 8 HOURS
Refills: 0 | Status: DISCONTINUED | OUTPATIENT
Start: 2022-11-25 | End: 2022-11-28

## 2022-11-25 RX ORDER — GENTAMICIN SULFATE 40 MG/ML
80 VIAL (ML) INJECTION EVERY 8 HOURS
Refills: 0 | Status: DISCONTINUED | OUTPATIENT
Start: 2022-11-25 | End: 2022-11-25

## 2022-11-25 RX ADMIN — PANTOPRAZOLE SODIUM 40 MILLIGRAM(S): 20 TABLET, DELAYED RELEASE ORAL at 06:42

## 2022-11-25 RX ADMIN — GABAPENTIN 100 MILLIGRAM(S): 400 CAPSULE ORAL at 13:16

## 2022-11-25 RX ADMIN — Medication 15 MILLIGRAM(S): at 21:08

## 2022-11-25 RX ADMIN — CEFTRIAXONE 100 MILLIGRAM(S): 500 INJECTION, POWDER, FOR SOLUTION INTRAMUSCULAR; INTRAVENOUS at 15:46

## 2022-11-25 RX ADMIN — Medication 500 MILLIGRAM(S): at 05:22

## 2022-11-25 RX ADMIN — Medication 15 MILLIGRAM(S): at 21:38

## 2022-11-25 RX ADMIN — Medication 650 MILLIGRAM(S): at 11:44

## 2022-11-25 RX ADMIN — Medication 650 MILLIGRAM(S): at 06:28

## 2022-11-25 RX ADMIN — Medication 500 MILLIGRAM(S): at 17:02

## 2022-11-25 RX ADMIN — CHLORHEXIDINE GLUCONATE 1 APPLICATION(S): 213 SOLUTION TOPICAL at 11:15

## 2022-11-25 RX ADMIN — AMIODARONE HYDROCHLORIDE 400 MILLIGRAM(S): 400 TABLET ORAL at 05:21

## 2022-11-25 RX ADMIN — Medication 5 MILLIGRAM(S): at 11:13

## 2022-11-25 RX ADMIN — Medication 650 MILLIGRAM(S): at 05:20

## 2022-11-25 RX ADMIN — GABAPENTIN 100 MILLIGRAM(S): 400 CAPSULE ORAL at 21:08

## 2022-11-25 RX ADMIN — Medication 15 MILLIGRAM(S): at 14:54

## 2022-11-25 RX ADMIN — OXYCODONE HYDROCHLORIDE 10 MILLIGRAM(S): 5 TABLET ORAL at 17:47

## 2022-11-25 RX ADMIN — Medication 104 MILLIGRAM(S): at 14:54

## 2022-11-25 RX ADMIN — Medication 81 MILLIGRAM(S): at 11:13

## 2022-11-25 RX ADMIN — OXYCODONE HYDROCHLORIDE 10 MILLIGRAM(S): 5 TABLET ORAL at 18:17

## 2022-11-25 RX ADMIN — Medication 650 MILLIGRAM(S): at 11:14

## 2022-11-25 RX ADMIN — Medication 15 MILLIGRAM(S): at 08:43

## 2022-11-25 RX ADMIN — Medication 103 MILLIGRAM(S): at 21:45

## 2022-11-25 RX ADMIN — APIXABAN 2.5 MILLIGRAM(S): 2.5 TABLET, FILM COATED ORAL at 05:20

## 2022-11-25 RX ADMIN — GABAPENTIN 100 MILLIGRAM(S): 400 CAPSULE ORAL at 05:21

## 2022-11-25 RX ADMIN — SENNA PLUS 2 TABLET(S): 8.6 TABLET ORAL at 21:07

## 2022-11-25 RX ADMIN — Medication 15 MILLIGRAM(S): at 15:09

## 2022-11-25 RX ADMIN — POLYETHYLENE GLYCOL 3350 17 GRAM(S): 17 POWDER, FOR SOLUTION ORAL at 11:13

## 2022-11-25 RX ADMIN — Medication 104 MILLIGRAM(S): at 08:29

## 2022-11-25 RX ADMIN — Medication 15 MILLIGRAM(S): at 04:25

## 2022-11-25 RX ADMIN — Medication 15 MILLIGRAM(S): at 08:28

## 2022-11-25 RX ADMIN — Medication 15 MILLIGRAM(S): at 03:27

## 2022-11-25 RX ADMIN — APIXABAN 2.5 MILLIGRAM(S): 2.5 TABLET, FILM COATED ORAL at 17:02

## 2022-11-25 NOTE — PROGRESS NOTE ADULT - SUBJECTIVE AND OBJECTIVE BOX
Patient is a 32y old  Female who presents with a chief complaint of Shortness of breath  .  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 VSD closed primarily   (25 Nov 2022 10:26)    Being followed by ID for        Interval history:  No other acute events      ROS:  No cough,SOB,CP  No N/V/D  No abd pain  No urinary complaints  No HA  No joint or limb pain  No other complaints    PAST MEDICAL & SURGICAL HISTORY:  Murmur      VSD (ventricular septal defect)        Allergies    No Known Allergies    Intolerances      Antimicrobials:    cefTRIAXone   IVPB 2000 milliGRAM(s) IV Intermittent every 24 hours  gentamicin   IVPB 60 milliGRAM(s) IV Intermittent every 8 hours    MEDICATIONS  (STANDING):  apixaban 2.5 milliGRAM(s) Oral two times a day  ascorbic acid 500 milliGRAM(s) Oral two times a day  aspirin enteric coated 81 milliGRAM(s) Oral daily  bisacodyl Suppository 10 milliGRAM(s) Rectal once  cefTRIAXone   IVPB 2000 milliGRAM(s) IV Intermittent every 24 hours  chlorhexidine 2% Cloths 1 Application(s) Topical daily  gabapentin 100 milliGRAM(s) Oral every 8 hours  gentamicin   IVPB 60 milliGRAM(s) IV Intermittent every 8 hours  insulin lispro (ADMELOG) corrective regimen sliding scale   SubCutaneous Before meals and at bedtime  insulin regular Infusion 3 Unit(s)/Hr (3 mL/Hr) IV Continuous <Continuous>  ketorolac   Injectable 15 milliGRAM(s) IV Push every 6 hours  pantoprazole    Tablet 40 milliGRAM(s) Oral before breakfast  polyethylene glycol 3350 17 Gram(s) Oral daily  senna 2 Tablet(s) Oral at bedtime  sodium chloride 0.9%. 1000 milliLiter(s) (10 mL/Hr) IV Continuous <Continuous>      Vital Signs Last 24 Hrs  T(C): 36.7 (11-25-22 @ 14:46), Max: 36.8 (11-24-22 @ 23:16)  T(F): 98.1 (11-25-22 @ 14:46), Max: 98.3 (11-24-22 @ 23:16)  HR: 66 (11-25-22 @ 14:46) (62 - 66)  BP: 100/62 (11-25-22 @ 14:46) (100/62 - 124/79)  BP(mean): 77 (11-25-22 @ 14:46) (77 - 97)  RR: 18 (11-25-22 @ 14:46) (16 - 18)  SpO2: 98% (11-25-22 @ 14:46) (98% - 99%)    Physical Exam:    Constitutional well preserved,comfortable,pleasant    HEENT PERRLA EOMI,No pallor or icterus    No oral exudate or erythema    Neck supple no JVD or LN    Chest Good AE,CTA    CVS RRR S1 S2 WNl No murmur or rub or gallop    Abd soft BS normal No tenderness no masses    Ext No cyanosis clubbing or edema    IV site no erythema tenderness or discharge    Joints no swelling or LOM    CNS AAO X 3 no focal    Lab Data:                          10.4   16.05 )-----------( 325      ( 25 Nov 2022 06:04 )             33.0       11-25    134<L>  |  101  |  28<H>  ----------------------------<  94  4.6   |  24  |  0.89    Ca    8.8      25 Nov 2022 06:04  Mg     2.5     11-25    TPro  6.2  /  Alb  3.0<L>  /  TBili  0.3  /  DBili  x   /  AST  34  /  ALT  38  /  AlkPhos  83  11-25          .Tissue Other  11-22-22   Testing in progress  --    Rare polymorphonuclear leukocytes seen per low power field  No organisms seen per oil power field                    WBC Count: 16.05 (11-25-22 @ 06:04)  WBC Count: 19.78 (11-24-22 @ 05:42)  WBC Count: 16.05 (11-23-22 @ 01:06)  WBC Count: 22.32 (11-22-22 @ 14:35)  WBC Count: 12.98 (11-21-22 @ 05:53)  WBC Count: 11.53 (11-20-22 @ 07:13)             Patient is a 32y old  Female who presents with a chief complaint of Shortness of breath  .  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 VSD closed primarily   (25 Nov 2022 10:26)    Being followed by ID for endocarditis         Interval history:  s/p Mitral valve replacement 22-Nov-2022 14:00:23 31mm Epic Tissue Valve Sanjiv Nava  VSD repair 22-Nov-2022 14:00:44  Sanjiv Nava.       Operative Findings:  · Operative Findings	Mitral valve vegetations resected and replaced with 31mm tissue epic valve. VSD closed primarily  pt now in cardiac step down  breathing stable   no fevers  no chills  no abd pain   no urinary sxs  hearing stable  remainder of ROS negative       PAST MEDICAL & SURGICAL HISTORY:  Murmur      VSD (ventricular septal defect)        Allergies    No Known Allergies    Intolerances      Antimicrobials:    cefTRIAXone   IVPB 2000 milliGRAM(s) IV Intermittent every 24 hours  gentamicin   IVPB 60 milliGRAM(s) IV Intermittent every 8 hours    MEDICATIONS  (STANDING):  apixaban 2.5 milliGRAM(s) Oral two times a day  ascorbic acid 500 milliGRAM(s) Oral two times a day  aspirin enteric coated 81 milliGRAM(s) Oral daily  bisacodyl Suppository 10 milliGRAM(s) Rectal once  cefTRIAXone   IVPB 2000 milliGRAM(s) IV Intermittent every 24 hours  chlorhexidine 2% Cloths 1 Application(s) Topical daily  gabapentin 100 milliGRAM(s) Oral every 8 hours  gentamicin   IVPB 60 milliGRAM(s) IV Intermittent every 8 hours  insulin lispro (ADMELOG) corrective regimen sliding scale   SubCutaneous Before meals and at bedtime  insulin regular Infusion 3 Unit(s)/Hr (3 mL/Hr) IV Continuous <Continuous>  ketorolac   Injectable 15 milliGRAM(s) IV Push every 6 hours  pantoprazole    Tablet 40 milliGRAM(s) Oral before breakfast  polyethylene glycol 3350 17 Gram(s) Oral daily  senna 2 Tablet(s) Oral at bedtime  sodium chloride 0.9%. 1000 milliLiter(s) (10 mL/Hr) IV Continuous <Continuous>      Vital Signs Last 24 Hrs  T(C): 36.7 (11-25-22 @ 14:46), Max: 36.8 (11-24-22 @ 23:16)  T(F): 98.1 (11-25-22 @ 14:46), Max: 98.3 (11-24-22 @ 23:16)  HR: 66 (11-25-22 @ 14:46) (62 - 66)  BP: 100/62 (11-25-22 @ 14:46) (100/62 - 124/79)  BP(mean): 77 (11-25-22 @ 14:46) (77 - 97)  RR: 18 (11-25-22 @ 14:46) (16 - 18)  SpO2: 98% (11-25-22 @ 14:46) (98% - 99%)    Physical Exam:    Constitutional well preserved,comfortable,pleasant    HEENT PERRLA EOMI,No pallor or icterus    No oral exudate or erythema    Neck supple no JVD or LN    Chest Good AE,CTA    sternum dressed    CVS  S1 S2   Abd soft BS normal No tenderness    Ext No cyanosis clubbing or edema    IV site no erythema tenderness or discharge    Joints no swelling or LOM    CNS AAO X 3 no focal    Lab Data:                          10.4   16.05 )-----------( 325      ( 25 Nov 2022 06:04 )             33.0       11-25    134<L>  |  101  |  28<H>  ----------------------------<  94  4.6   |  24  |  0.89    Ca    8.8      25 Nov 2022 06:04  Mg     2.5     11-25    TPro  6.2  /  Alb  3.0<L>  /  TBili  0.3  /  DBili  x   /  AST  34  /  ALT  38  /  AlkPhos  83  11-25          .Tissue Other  11-22-22   Testing in progress  --    Rare polymorphonuclear leukocytes seen per low power field  No organisms seen per oil power field      WBC Count: 16.05 (11-25-22 @ 06:04)  WBC Count: 19.78 (11-24-22 @ 05:42)  WBC Count: 16.05 (11-23-22 @ 01:06)  WBC Count: 22.32 (11-22-22 @ 14:35)  WBC Count: 12.98 (11-21-22 @ 05:53)  WBC Count: 11.53 (11-20-22 @ 07:13)      < from: Intra-Operative Transesophageal Echo (11.22.22 @ 12:24) >  Conclusions:  1. Bileaflet mitral flail (A2/P2) with thickened anterior  leaflet.  Large coaptation defect. Severe mitral regurgitation.  2. Grossly normal trileaflet aortic valve with no obvious  vegetations present. Minimal aortic regurgitation.  3. Aortic Annulus: 1.8 cm.  Aortic Root: 2.1 cm.  Ascending Aorta: 1.9 cm.  4. Mild left atrial enlargement.  5. Normal left ventricular internal dimensions and wall  thicknesses.  6. Normal left ventricular systolic function. No segmental  wall motion abnormalities.  7. Normal right atrium.  8. Right ventricular enlargement with mildly decreased  right ventricular systolic function.  9. Grossly normal tricuspid valve. Moderate tricuspid  regurgitation.  10. Normal pulmonic valve. Minimal pulmonic regurgitation.  11. Color Doppler demonstrates no evidence of a patent  foramen ovale.  Perimembranous VSD seen with left to right shunting.  12. Normal pericardium with no pericardial effusion.  13. Left pleural effusion.  Confirmed on  11/22/2022 - 13:10:41 by Wolf Moreno M.D.  ------------------------------------------------------------------------    < end of copied text >      Culture - Blood (11.15.22 @ 00:56)    Gram Stain:   Growth in anaerobic bottle: Gram positive cocci in pairs    Specimen Source: .Blood Blood    Culture Results:   Growth in anaerobic bottle: Gram Positive Cocci in Pairs and Chains Most  closely resembling Gemella species  "Susceptibilities not performed"

## 2022-11-25 NOTE — PROGRESS NOTE ADULT - ASSESSMENT
32 year old with history of unrepaired VSD presents with sob, cough , hemoptysis , fever, fatigue to [facility]. 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 She has no dental problems and no recent dental work except for replacement of a cap months ago   Denies HA, change i mental status  Feels better since ab started but having very bad burning fro m the pcn infusion.   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 VSD/MR    She underwent repair of her VSD and replacement of the MV for her gemella endocarditis   she is now awake, alert, extubated    pt is now back on ceftriaxone/ gentamycin    follow the gentamycin levels, want trough to be low    check ESR, CRP    await OR cultures     Maki Lee M.D. ,   please reach via teams   If no answer, or after 5PM/ weekends,  then please call  [phone]      Assessment and plan discussed with the primary team .    ID service will be covering over the weekend. Please call for acute issues or questions. [phone]

## 2022-11-25 NOTE — DIETITIAN INITIAL EVALUATION ADULT - PERTINENT MEDS FT
MEDICATIONS  (STANDING):  acetaminophen     Tablet .. 650 milliGRAM(s) Oral every 6 hours  apixaban 2.5 milliGRAM(s) Oral two times a day  ascorbic acid 500 milliGRAM(s) Oral two times a day  aspirin enteric coated 81 milliGRAM(s) Oral daily  bisacodyl 5 milliGRAM(s) Oral once  bisacodyl Suppository 10 milliGRAM(s) Rectal once  cefTRIAXone   IVPB 2000 milliGRAM(s) IV Intermittent every 24 hours  chlorhexidine 2% Cloths 1 Application(s) Topical daily  gabapentin 100 milliGRAM(s) Oral every 8 hours  gentamicin   IVPB 80 milliGRAM(s) IV Intermittent every 8 hours  insulin lispro (ADMELOG) corrective regimen sliding scale   SubCutaneous Before meals and at bedtime  insulin regular Infusion 3 Unit(s)/Hr (3 mL/Hr) IV Continuous <Continuous>  ketorolac   Injectable 15 milliGRAM(s) IV Push every 6 hours  pantoprazole    Tablet 40 milliGRAM(s) Oral before breakfast  polyethylene glycol 3350 17 Gram(s) Oral daily  senna 2 Tablet(s) Oral at bedtime  sodium chloride 0.9%. 1000 milliLiter(s) (10 mL/Hr) IV Continuous <Continuous>    MEDICATIONS  (PRN):  acetaminophen     Tablet .. 650 milliGRAM(s) Oral every 6 hours PRN Mild Pain (1 - 3)  HYDROmorphone  Injectable 1 milliGRAM(s) IV Push every 4 hours PRN Moderate Pain (4 - 6)  oxyCODONE    IR 5 milliGRAM(s) Oral every 4 hours PRN Moderate Pain (4 - 6)  oxyCODONE    IR 10 milliGRAM(s) Oral every 4 hours PRN Severe Pain (7 - 10)

## 2022-11-25 NOTE — DIETITIAN INITIAL EVALUATION ADULT - ADD RECOMMEND
1) Will continue to monitor PO intake, weight, labs, skin, GI status and diet 2) continue vitamin C to aid in incision healing 3) RD to add No Sugar Added Mighty Shake supplement to aid in meeting nutrient needs 4) continue bowel regimen due to some constipation

## 2022-11-25 NOTE — PROGRESS NOTE ADULT - SUBJECTIVE AND OBJECTIVE BOX
Subjective:  "Hello"  Sitting up in bed      Tele:  SR 60-70           V/S:                    T(F): 97.4 (22 @ 07:03), Max: 98.3 (22 @ 23:16)  HR: 63 (22 @ 07:03) (62 - 67)  BP: 104/68 (22 @ 07:03) (104/68 - 124/79)  RR: 18 (22 @ 07:03) (16 - 18)  SpO2: 99% (22 @ 07:03) (98% - 99%)  Wt(kg): --      LV EF:      Labs:      134<L>  |  101  |  28<H>  ----------------------------<  94  4.6   |  24  |  0.89    Ca    8.8      2022 06:04  Mg     2.5         TPro  6.2  /  Alb  3.0<L>  /  TBili  0.3  /  DBili  x   /  AST  34  /  ALT  38  /  AlkPhos  83                                 10.4   16.05 )-----------( 325      ( 2022 06:04 )             33.0             CAPILLARY BLOOD GLUCOSE      POCT Blood Glucose.: 92 mg/dL (2022 07:23)  POCT Blood Glucose.: 111 mg/dL (2022 21:24)  POCT Blood Glucose.: 156 mg/dL (2022 16:56)  POCT Blood Glucose.: 104 mg/dL (2022 11:45)           CXR:    I&O's Detail    2022 07:01  -  2022 07:00  --------------------------------------------------------  IN:    Oral Fluid: 850 mL  Total IN: 850 mL    OUT:    Chest Tube (mL): 230 mL    Voided (mL): 850 mL  Total OUT: 1080 mL    Total NET: -230 mL      2022 07:01  -  2022 09:30  --------------------------------------------------------  IN:    Oral Fluid: 240 mL  Total IN: 240 mL    OUT:  Total OUT: 0 mL    Total NET: 240 mL          CHEST TUBE:  [x ] YES [ ] NO  OUTPUT: 80    per 24 hours       EPICARDIAL WIRES:  [ ] YES [ x] NO      BOWEL MOVEMENT:  [ ] YES [x ] NO      Daily     Daily Weight in k.3 (2022 09:42)  Medications:  acetaminophen     Tablet .. 650 milliGRAM(s) Oral every 6 hours  acetaminophen     Tablet .. 650 milliGRAM(s) Oral every 6 hours PRN  apixaban 2.5 milliGRAM(s) Oral two times a day  ascorbic acid 500 milliGRAM(s) Oral two times a day  aspirin enteric coated 81 milliGRAM(s) Oral daily  bisacodyl Suppository 10 milliGRAM(s) Rectal once  cefTRIAXone   IVPB 2000 milliGRAM(s) IV Intermittent every 24 hours  chlorhexidine 2% Cloths 1 Application(s) Topical daily  gabapentin 100 milliGRAM(s) Oral every 8 hours  gentamicin   IVPB 80 milliGRAM(s) IV Intermittent every 8 hours  HYDROmorphone  Injectable 1 milliGRAM(s) IV Push every 4 hours PRN  insulin lispro (ADMELOG) corrective regimen sliding scale   SubCutaneous Before meals and at bedtime  insulin regular Infusion 3 Unit(s)/Hr IV Continuous <Continuous>  ketorolac   Injectable 15 milliGRAM(s) IV Push every 6 hours  oxyCODONE    IR 5 milliGRAM(s) Oral every 4 hours PRN  oxyCODONE    IR 10 milliGRAM(s) Oral every 4 hours PRN  pantoprazole    Tablet 40 milliGRAM(s) Oral before breakfast  polyethylene glycol 3350 17 Gram(s) Oral daily  senna 2 Tablet(s) Oral at bedtime  sodium chloride 0.9%. 1000 milliLiter(s) IV Continuous <Continuous>        Physical Exam:    Neurology: alert and oriented x 3    CV : s1 s2 RRR    Sternal Wound :  Op site dsg in place  + R pleural self sx> removed    Lungs: Diminished though clear bilaterally    Abdomen: soft, nontender, nondistended, positive bowel sounds, No BM since surgery    :    voiding       Extremities:   trace lower extreem edema, warm, well perfused  RUE  PICC in place                PAST MEDICAL & SURGICAL HISTORY:  Murmur      VSD (ventricular septal defect)

## 2022-11-25 NOTE — DIETITIAN INITIAL EVALUATION ADULT - PHYSCIAL ASSESSMENT
reported -150 pounds.  Current weight 151.8 pounds, some edema noted.   RD will continue to monitor trends.

## 2022-11-25 NOTE — DIETITIAN INITIAL EVALUATION ADULT - PERTINENT LABORATORY DATA
11-25    134<L>  |  101  |  28<H>  ----------------------------<  94  4.6   |  24  |  0.89    Ca    8.8      25 Nov 2022 06:04  Mg     2.5     11-25    TPro  6.2  /  Alb  3.0<L>  /  TBili  0.3  /  DBili  x   /  AST  34  /  ALT  38  /  AlkPhos  83  11-25  POCT Blood Glucose.: 92 mg/dL (11-25-22 @ 07:23)  A1C with Estimated Average Glucose Result: 5.5 % (11-15-22 @ 00:54)    11/10 iron 28 (L)

## 2022-11-25 NOTE — DIETITIAN INITIAL EVALUATION ADULT - PERSON TAUGHT/METHOD
Emphasized importance of adequate lean protein intake and optimal blood glucose levels for wound healing. Advised pt to limit concentrated sweets, portion control. Also discussed importance of limiting sodium intake as well as heart healthy food options. Pt made aware RD to remain available./verbal instruction/patient instructed

## 2022-11-25 NOTE — DIETITIAN INITIAL EVALUATION ADULT - NSFNSGIIOFT_GEN_A_CORE
11-24-22 @ 07:01  -  11-25-22 @ 07:00  --------------------------------------------------------  OUT:    Chest Tube (mL): 230 mL  Total OUT: 230 mL    Total NET: -230 mL  .  Denies current nausea/vomiting/diarrhea. some constipation noted. previously with some nausea. Last bowel movement per flow sheets 11/21.

## 2022-11-25 NOTE — DIETITIAN INITIAL EVALUATION ADULT - ORAL INTAKE PTA/DIET HISTORY
visited pt at bedside this morning. good appetite PTA, not following a therapeutic diet. confirms NKFA.

## 2022-11-25 NOTE — DIETITIAN INITIAL EVALUATION ADULT - NS FNS DIET ORDER
Diet, Regular:   Consistent Carbohydrate {No Snacks} (CSTCHO)  DASH/TLC {Sodium & Cholesterol Restricted} (DASH) (11-23-22 @ 15:41) [Active]

## 2022-11-25 NOTE — PROGRESS NOTE ADULT - ASSESSMENT
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 Initially patient presented to the [facility] ED w chronic cough, SOB, hematemesis & hemoptysis. Pt complained of  chronic cough for the past 2 months, initially non-productive, now slightly productive associated with SOB. Initially she was able to climb one flight of stairs, but the SOB & cough have worsened over the past 3 weeks, now claims that she would be short of breath if she walked 5ft She was seen by her PCP & pulmonologist for the cough & sob and was advised to use inhalers and azithromycin with no relief. Wednesda night 11/9 into Thursday AM 11/10 her cough worsened and also had an episode of vomiting, found 10-15 ml of blood mixed with the vomitus. Her cough episode later showed sputum w blood streaks. Claims that this was new, and never happened in the past 2 months. Patient was admited to [facility]. Patient was found to be in Acute HF and an echo was done showing Severe mitral valve regurgitation and . Patient was then transferred to [facility] to 11/14 under Dr. Denson for Evaluation of MV regurgitation/endocarditis and VSD. Patient currently admits to coughing that has only been helped by Lasix that was being administered at [facility].  most Recent travel in may to [facility] currently denies Chest pain, fevers, chills, diarrhea, headaches, urinary or bowel changes.11/21 PREOP for AM 11/22.  Continue ceftriaxone / Gent ( last dose tonight per ID) / + Vanc x1 on call to OR Tuesday. NPO after MN. COVID pcr sent this AM /neg.   11/22/22 Mitral valve vegetations resected and replaced with 31mm tissue epic valve. VSD closed primarily    eXTUBATED D 0  prophylactic vanco , then resume  ceftriaxone and Gentamycin  Hyopoxia, likeley pain related, pain management  D/c MS chest tubes as drainage decreases.  iNSULIN  INFUSION  Johnson out , DTV  Plan to place PICC then start eliquis  11/23 transferred to sdu  11/24 R ct with drainage will leave in place.  L ct minimal, plan to d/c today.  Picc placed  11/25 R pleural tube removed Eliquis ^  5 mg bid MVR tissue Await abx reqs/length to prepare for DC home

## 2022-11-25 NOTE — DIETITIAN INITIAL EVALUATION ADULT - REASON FOR ADMISSION
Shortness of breath  .  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 VSD closed primarily

## 2022-11-26 LAB
ALBUMIN SERPL ELPH-MCNC: 3.2 G/DL — LOW (ref 3.3–5)
ALP SERPL-CCNC: 83 U/L — SIGNIFICANT CHANGE UP (ref 40–120)
ALT FLD-CCNC: 30 U/L — SIGNIFICANT CHANGE UP (ref 10–45)
ANION GAP SERPL CALC-SCNC: 9 MMOL/L — SIGNIFICANT CHANGE UP (ref 5–17)
AST SERPL-CCNC: 25 U/L — SIGNIFICANT CHANGE UP (ref 10–40)
BILIRUB SERPL-MCNC: 0.3 MG/DL — SIGNIFICANT CHANGE UP (ref 0.2–1.2)
BUN SERPL-MCNC: 22 MG/DL — SIGNIFICANT CHANGE UP (ref 7–23)
CALCIUM SERPL-MCNC: 9 MG/DL — SIGNIFICANT CHANGE UP (ref 8.4–10.5)
CHLORIDE SERPL-SCNC: 102 MMOL/L — SIGNIFICANT CHANGE UP (ref 96–108)
CO2 SERPL-SCNC: 24 MMOL/L — SIGNIFICANT CHANGE UP (ref 22–31)
CREAT SERPL-MCNC: 0.95 MG/DL — SIGNIFICANT CHANGE UP (ref 0.5–1.3)
EGFR: 81 ML/MIN/1.73M2 — SIGNIFICANT CHANGE UP
GLUCOSE BLDC GLUCOMTR-MCNC: 107 MG/DL — HIGH (ref 70–99)
GLUCOSE BLDC GLUCOMTR-MCNC: 111 MG/DL — HIGH (ref 70–99)
GLUCOSE BLDC GLUCOMTR-MCNC: 88 MG/DL — SIGNIFICANT CHANGE UP (ref 70–99)
GLUCOSE BLDC GLUCOMTR-MCNC: 90 MG/DL — SIGNIFICANT CHANGE UP (ref 70–99)
GLUCOSE SERPL-MCNC: 95 MG/DL — SIGNIFICANT CHANGE UP (ref 70–99)
HCT VFR BLD CALC: 31.3 % — LOW (ref 34.5–45)
HGB BLD-MCNC: 10 G/DL — LOW (ref 11.5–15.5)
MAGNESIUM SERPL-MCNC: 2.3 MG/DL — SIGNIFICANT CHANGE UP (ref 1.6–2.6)
MCHC RBC-ENTMCNC: 27 PG — SIGNIFICANT CHANGE UP (ref 27–34)
MCHC RBC-ENTMCNC: 31.9 GM/DL — LOW (ref 32–36)
MCV RBC AUTO: 84.6 FL — SIGNIFICANT CHANGE UP (ref 80–100)
NRBC # BLD: 0 /100 WBCS — SIGNIFICANT CHANGE UP (ref 0–0)
PHOSPHATE SERPL-MCNC: 3.8 MG/DL — SIGNIFICANT CHANGE UP (ref 2.5–4.5)
PLATELET # BLD AUTO: 332 K/UL — SIGNIFICANT CHANGE UP (ref 150–400)
POTASSIUM SERPL-MCNC: 4.7 MMOL/L — SIGNIFICANT CHANGE UP (ref 3.5–5.3)
POTASSIUM SERPL-SCNC: 4.7 MMOL/L — SIGNIFICANT CHANGE UP (ref 3.5–5.3)
PROT SERPL-MCNC: 6.3 G/DL — SIGNIFICANT CHANGE UP (ref 6–8.3)
RBC # BLD: 3.7 M/UL — LOW (ref 3.8–5.2)
RBC # FLD: 15.4 % — HIGH (ref 10.3–14.5)
SODIUM SERPL-SCNC: 135 MMOL/L — SIGNIFICANT CHANGE UP (ref 135–145)
WBC # BLD: 12.13 K/UL — HIGH (ref 3.8–10.5)
WBC # FLD AUTO: 12.13 K/UL — HIGH (ref 3.8–10.5)

## 2022-11-26 RX ORDER — ACETAMINOPHEN 500 MG
650 TABLET ORAL EVERY 6 HOURS
Refills: 0 | Status: DISCONTINUED | OUTPATIENT
Start: 2022-11-26 | End: 2022-11-28

## 2022-11-26 RX ORDER — ONDANSETRON 8 MG/1
4 TABLET, FILM COATED ORAL ONCE
Refills: 0 | Status: COMPLETED | OUTPATIENT
Start: 2022-11-26 | End: 2022-11-26

## 2022-11-26 RX ORDER — APIXABAN 2.5 MG/1
2.5 TABLET, FILM COATED ORAL ONCE
Refills: 0 | Status: COMPLETED | OUTPATIENT
Start: 2022-11-26 | End: 2022-11-26

## 2022-11-26 RX ORDER — APIXABAN 2.5 MG/1
5 TABLET, FILM COATED ORAL
Refills: 0 | Status: DISCONTINUED | OUTPATIENT
Start: 2022-11-26 | End: 2022-11-28

## 2022-11-26 RX ORDER — KETOROLAC TROMETHAMINE 30 MG/ML
15 SYRINGE (ML) INJECTION ONCE
Refills: 0 | Status: DISCONTINUED | OUTPATIENT
Start: 2022-11-26 | End: 2022-11-26

## 2022-11-26 RX ADMIN — GABAPENTIN 100 MILLIGRAM(S): 400 CAPSULE ORAL at 05:02

## 2022-11-26 RX ADMIN — Medication 650 MILLIGRAM(S): at 20:00

## 2022-11-26 RX ADMIN — Medication 500 MILLIGRAM(S): at 05:02

## 2022-11-26 RX ADMIN — Medication 15 MILLIGRAM(S): at 03:35

## 2022-11-26 RX ADMIN — GABAPENTIN 100 MILLIGRAM(S): 400 CAPSULE ORAL at 13:25

## 2022-11-26 RX ADMIN — OXYCODONE HYDROCHLORIDE 5 MILLIGRAM(S): 5 TABLET ORAL at 16:00

## 2022-11-26 RX ADMIN — Medication 15 MILLIGRAM(S): at 03:05

## 2022-11-26 RX ADMIN — Medication 500 MILLIGRAM(S): at 17:04

## 2022-11-26 RX ADMIN — GABAPENTIN 100 MILLIGRAM(S): 400 CAPSULE ORAL at 22:02

## 2022-11-26 RX ADMIN — Medication 15 MILLIGRAM(S): at 22:35

## 2022-11-26 RX ADMIN — OXYCODONE HYDROCHLORIDE 10 MILLIGRAM(S): 5 TABLET ORAL at 09:18

## 2022-11-26 RX ADMIN — APIXABAN 2.5 MILLIGRAM(S): 2.5 TABLET, FILM COATED ORAL at 05:02

## 2022-11-26 RX ADMIN — Medication 103 MILLIGRAM(S): at 22:02

## 2022-11-26 RX ADMIN — OXYCODONE HYDROCHLORIDE 5 MILLIGRAM(S): 5 TABLET ORAL at 15:16

## 2022-11-26 RX ADMIN — Medication 5 MILLIGRAM(S): at 12:51

## 2022-11-26 RX ADMIN — Medication 650 MILLIGRAM(S): at 19:23

## 2022-11-26 RX ADMIN — Medication 103 MILLIGRAM(S): at 13:22

## 2022-11-26 RX ADMIN — APIXABAN 5 MILLIGRAM(S): 2.5 TABLET, FILM COATED ORAL at 17:04

## 2022-11-26 RX ADMIN — Medication 81 MILLIGRAM(S): at 11:46

## 2022-11-26 RX ADMIN — OXYCODONE HYDROCHLORIDE 10 MILLIGRAM(S): 5 TABLET ORAL at 10:00

## 2022-11-26 RX ADMIN — ONDANSETRON 4 MILLIGRAM(S): 8 TABLET, FILM COATED ORAL at 19:01

## 2022-11-26 RX ADMIN — Medication 103 MILLIGRAM(S): at 05:03

## 2022-11-26 RX ADMIN — CEFTRIAXONE 100 MILLIGRAM(S): 500 INJECTION, POWDER, FOR SOLUTION INTRAMUSCULAR; INTRAVENOUS at 16:10

## 2022-11-26 RX ADMIN — Medication 15 MILLIGRAM(S): at 23:00

## 2022-11-26 RX ADMIN — PANTOPRAZOLE SODIUM 40 MILLIGRAM(S): 20 TABLET, DELAYED RELEASE ORAL at 05:02

## 2022-11-26 RX ADMIN — APIXABAN 2.5 MILLIGRAM(S): 2.5 TABLET, FILM COATED ORAL at 08:07

## 2022-11-26 NOTE — PROGRESS NOTE ADULT - SUBJECTIVE AND OBJECTIVE BOX
Subjective: "Feel a little better today, slept better last nite"  Ambulating independently in fragoso      Tele: SR  60s            V/S:                    T(F): 98 (22 @ 07:05), Max: 98.3 (22 @ 19:08)  HR: 82 (22 @ 11:37) (65 - 82)  BP: 121/71 (22 @ 11:37) (100/62 - 121/71)  RR: 18 (22 @ 11:37) (18 - 18)  SpO2: 95% (22 @ 11:37) (95% - 98%)  Wt(kg): --      LV EF:      Labs:      135  |  102  |  22  ----------------------------<  95  4.7   |  24  |  0.95    Ca    9.0      2022 05:32  Phos  3.8       Mg     2.3         TPro  6.3  /  Alb  3.2<L>  /  TBili  0.3  /  DBili  x   /  AST  25  /  ALT  30  /  AlkPhos  83                                 10.0   12.13 )-----------( 332      ( 2022 05:32 )             31.3             CAPILLARY BLOOD GLUCOSE      POCT Blood Glucose.: 111 mg/dL (2022 11:48)  POCT Blood Glucose.: 88 mg/dL (2022 07:38)  POCT Blood Glucose.: 94 mg/dL (2022 21:20)  POCT Blood Glucose.: 95 mg/dL (2022 16:25)           CXR:    I&O's Detail    2022 07:01  -  2022 07:00  --------------------------------------------------------  IN:    IV PiggyBack: 50 mL    IV PiggyBack: 100 mL    Oral Fluid: 660 mL  Total IN: 810 mL    OUT:    Voided (mL): 800 mL  Total OUT: 800 mL    Total NET: 10 mL      2022 07:01  -  2022 12:44  --------------------------------------------------------  IN:    Oral Fluid: 240 mL  Total IN: 240 mL    OUT:    Voided (mL): 300 mL  Total OUT: 300 mL    Total NET: -60 mL    BOWEL MOVEMENT:  [x ] YES [ ] NO      Daily     Daily Weight in k.9 (2022 08:07)  Medications:  acetaminophen     Tablet .. 650 milliGRAM(s) Oral every 6 hours PRN  apixaban 5 milliGRAM(s) Oral two times a day  ascorbic acid 500 milliGRAM(s) Oral two times a day  aspirin enteric coated 81 milliGRAM(s) Oral daily  bisacodyl 5 milliGRAM(s) Oral daily PRN  bisacodyl Suppository 10 milliGRAM(s) Rectal once  cefTRIAXone   IVPB 2000 milliGRAM(s) IV Intermittent every 24 hours  chlorhexidine 2% Cloths 1 Application(s) Topical daily  gabapentin 100 milliGRAM(s) Oral every 8 hours  gentamicin   IVPB 60 milliGRAM(s) IV Intermittent every 8 hours  insulin lispro (ADMELOG) corrective regimen sliding scale   SubCutaneous Before meals and at bedtime  insulin regular Infusion 3 Unit(s)/Hr IV Continuous <Continuous>  oxyCODONE    IR 5 milliGRAM(s) Oral every 4 hours PRN  pantoprazole    Tablet 40 milliGRAM(s) Oral before breakfast  polyethylene glycol 3350 17 Gram(s) Oral daily  senna 2 Tablet(s) Oral at bedtime  sodium chloride 0.9%. 1000 milliLiter(s) IV Continuous <Continuous>        Physical Exam:    Neurology: alert and oriented x 3    CV : s1 s2 RRR    Sternal Wound :  Op site dsg removed Incision clean    Lungs: Diminished though clear bilaterally    Abdomen: soft, nontender, nondistended, positive bowel sounds, BM last evening    :    voiding       Extremities:   trace lower extreem edema, warm, well perfused  RUE  PICC in place               PAST MEDICAL & SURGICAL HISTORY:  Murmur      VSD (ventricular septal defect)

## 2022-11-26 NOTE — PROGRESS NOTE ADULT - ASSESSMENT
31 yo F w PMH of congenital VSD (uncorrected) presents to [facility] CTS for 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 Initially patient presented to the [facility] ED w chronic cough, SOB, hematemesis & hemoptysis. Pt complained of  chronic cough for the past 2 months, initially non-productive, now slightly productive associated with SOB. Initially she was able to climb one flight of stairs, but the SOB & cough have worsened over the past 3 weeks, now claims that she would be short of breath if she walked 5ft She was seen by her PCP & pulmonologist for the cough & sob and was advised to use inhalers and azithromycin with no relief. Wednesda night 11/9 into Thursday AM 11/10 her cough worsened and also had an episode of vomiting, found 10-15 ml of blood mixed with the vomitus. Her cough episode later showed sputum w blood streaks. Claims that this was new, and never happened in the past 2 months. Patient was admited to [facility]. Patient was found to be in Acute HF and an echo was done showing Severe mitral valve regurgitation and . Patient was then transferred to [facility] to 11/14 under Dr. Denson for Evaluation of MV regurgitation/endocarditis and VSD. Patient currently admits to coughing that has only been helped by Lasix that was being administered at [facility].  most Recent travel in may to [facility] currently denies Chest pain, fevers, chills, diarrhea, headaches, urinary or bowel changes.11/21 PREOP for AM 11/22.  Continue ceftriaxone / Gent ( last dose tonight per ID) / + Vanc x1 on call to OR Tuesday. NPO after MN. COVID pcr sent this AM /neg.   11/22/22 Mitral valve vegetations resected and replaced with 31mm tissue epic valve. VSD closed primarily    eXTUBATED D 0  prophylactic vanco , then resume  ceftriaxone and Gentamycin  Hyopoxia, likeley pain related, pain management  D/c MS chest tubes as drainage decreases.  iNSULIN  INFUSION  Johnson out , DTV  Plan to place PICC then start eliquis  11/23 transferred to sdu  11/24 R ct with drainage will leave in place.  L ct minimal, plan to d/c today.  Picc placed  11/25 R pleural tube removed Eliquis ^  5 mg bid MVR tissue Await abx reqs/length to prepare for DC home  11/26 ABX thru 1/3/23  Eliquis bid tissue valve Await DC planning abx infusion authorization

## 2022-11-27 LAB
ALBUMIN SERPL ELPH-MCNC: 3.2 G/DL — LOW (ref 3.3–5)
ALP SERPL-CCNC: 79 U/L — SIGNIFICANT CHANGE UP (ref 40–120)
ALT FLD-CCNC: 24 U/L — SIGNIFICANT CHANGE UP (ref 10–45)
ANION GAP SERPL CALC-SCNC: 9 MMOL/L — SIGNIFICANT CHANGE UP (ref 5–17)
AST SERPL-CCNC: 18 U/L — SIGNIFICANT CHANGE UP (ref 10–40)
BILIRUB SERPL-MCNC: 0.3 MG/DL — SIGNIFICANT CHANGE UP (ref 0.2–1.2)
BUN SERPL-MCNC: 16 MG/DL — SIGNIFICANT CHANGE UP (ref 7–23)
CALCIUM SERPL-MCNC: 9 MG/DL — SIGNIFICANT CHANGE UP (ref 8.4–10.5)
CHLORIDE SERPL-SCNC: 102 MMOL/L — SIGNIFICANT CHANGE UP (ref 96–108)
CO2 SERPL-SCNC: 26 MMOL/L — SIGNIFICANT CHANGE UP (ref 22–31)
CREAT SERPL-MCNC: 0.88 MG/DL — SIGNIFICANT CHANGE UP (ref 0.5–1.3)
CULTURE RESULTS: NO GROWTH — SIGNIFICANT CHANGE UP
EGFR: 89 ML/MIN/1.73M2 — SIGNIFICANT CHANGE UP
GLUCOSE SERPL-MCNC: 83 MG/DL — SIGNIFICANT CHANGE UP (ref 70–99)
HCT VFR BLD CALC: 30.1 % — LOW (ref 34.5–45)
HGB BLD-MCNC: 9.5 G/DL — LOW (ref 11.5–15.5)
MAGNESIUM SERPL-MCNC: 2.2 MG/DL — SIGNIFICANT CHANGE UP (ref 1.6–2.6)
MCHC RBC-ENTMCNC: 27.1 PG — SIGNIFICANT CHANGE UP (ref 27–34)
MCHC RBC-ENTMCNC: 31.6 GM/DL — LOW (ref 32–36)
MCV RBC AUTO: 86 FL — SIGNIFICANT CHANGE UP (ref 80–100)
NRBC # BLD: 0 /100 WBCS — SIGNIFICANT CHANGE UP (ref 0–0)
PHOSPHATE SERPL-MCNC: 4.3 MG/DL — SIGNIFICANT CHANGE UP (ref 2.5–4.5)
PLATELET # BLD AUTO: 306 K/UL — SIGNIFICANT CHANGE UP (ref 150–400)
POTASSIUM SERPL-MCNC: 4.6 MMOL/L — SIGNIFICANT CHANGE UP (ref 3.5–5.3)
POTASSIUM SERPL-SCNC: 4.6 MMOL/L — SIGNIFICANT CHANGE UP (ref 3.5–5.3)
PROT SERPL-MCNC: 6.4 G/DL — SIGNIFICANT CHANGE UP (ref 6–8.3)
RBC # BLD: 3.5 M/UL — LOW (ref 3.8–5.2)
RBC # FLD: 15.6 % — HIGH (ref 10.3–14.5)
SODIUM SERPL-SCNC: 137 MMOL/L — SIGNIFICANT CHANGE UP (ref 135–145)
SPECIMEN SOURCE: SIGNIFICANT CHANGE UP
WBC # BLD: 10.21 K/UL — SIGNIFICANT CHANGE UP (ref 3.8–10.5)
WBC # FLD AUTO: 10.21 K/UL — SIGNIFICANT CHANGE UP (ref 3.8–10.5)

## 2022-11-27 RX ORDER — ACETAMINOPHEN 500 MG
975 TABLET ORAL EVERY 8 HOURS
Refills: 0 | Status: DISCONTINUED | OUTPATIENT
Start: 2022-11-27 | End: 2022-11-28

## 2022-11-27 RX ADMIN — Medication 103 MILLIGRAM(S): at 13:17

## 2022-11-27 RX ADMIN — CEFTRIAXONE 100 MILLIGRAM(S): 500 INJECTION, POWDER, FOR SOLUTION INTRAMUSCULAR; INTRAVENOUS at 11:05

## 2022-11-27 RX ADMIN — PANTOPRAZOLE SODIUM 40 MILLIGRAM(S): 20 TABLET, DELAYED RELEASE ORAL at 06:09

## 2022-11-27 RX ADMIN — Medication 5 MILLIGRAM(S): at 16:35

## 2022-11-27 RX ADMIN — Medication 81 MILLIGRAM(S): at 11:10

## 2022-11-27 RX ADMIN — OXYCODONE HYDROCHLORIDE 5 MILLIGRAM(S): 5 TABLET ORAL at 18:29

## 2022-11-27 RX ADMIN — APIXABAN 5 MILLIGRAM(S): 2.5 TABLET, FILM COATED ORAL at 16:29

## 2022-11-27 RX ADMIN — Medication 975 MILLIGRAM(S): at 17:10

## 2022-11-27 RX ADMIN — CHLORHEXIDINE GLUCONATE 1 APPLICATION(S): 213 SOLUTION TOPICAL at 11:14

## 2022-11-27 RX ADMIN — Medication 500 MILLIGRAM(S): at 05:45

## 2022-11-27 RX ADMIN — OXYCODONE HYDROCHLORIDE 5 MILLIGRAM(S): 5 TABLET ORAL at 23:20

## 2022-11-27 RX ADMIN — OXYCODONE HYDROCHLORIDE 5 MILLIGRAM(S): 5 TABLET ORAL at 19:45

## 2022-11-27 RX ADMIN — GABAPENTIN 100 MILLIGRAM(S): 400 CAPSULE ORAL at 05:45

## 2022-11-27 RX ADMIN — OXYCODONE HYDROCHLORIDE 5 MILLIGRAM(S): 5 TABLET ORAL at 23:50

## 2022-11-27 RX ADMIN — Medication 975 MILLIGRAM(S): at 08:30

## 2022-11-27 RX ADMIN — POLYETHYLENE GLYCOL 3350 17 GRAM(S): 17 POWDER, FOR SOLUTION ORAL at 11:10

## 2022-11-27 RX ADMIN — Medication 975 MILLIGRAM(S): at 16:28

## 2022-11-27 RX ADMIN — Medication 975 MILLIGRAM(S): at 07:46

## 2022-11-27 RX ADMIN — Medication 103 MILLIGRAM(S): at 21:39

## 2022-11-27 RX ADMIN — SENNA PLUS 2 TABLET(S): 8.6 TABLET ORAL at 21:40

## 2022-11-27 RX ADMIN — Medication 103 MILLIGRAM(S): at 05:47

## 2022-11-27 RX ADMIN — APIXABAN 5 MILLIGRAM(S): 2.5 TABLET, FILM COATED ORAL at 05:45

## 2022-11-27 NOTE — PROGRESS NOTE ADULT - ASSESSMENT
31 yo F w PMH of congenital VSD (uncorrected) presents to [facility] CTS for 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 Initially patient presented to the [facility] ED w chronic cough, SOB, hematemesis & hemoptysis. Pt complained of  chronic cough for the past 2 months, initially non-productive, now slightly productive associated with SOB. Initially she was able to climb one flight of stairs, but the SOB & cough have worsened over the past 3 weeks, now claims that she would be short of breath if she walked 5ft She was seen by her PCP & pulmonologist for the cough & sob and was advised to use inhalers and azithromycin with no relief. Wednesda night 11/9 into Thursday AM 11/10 her cough worsened and also had an episode of vomiting, found 10-15 ml of blood mixed with the vomitus. Her cough episode later showed sputum w blood streaks. Claims that this was new, and never happened in the past 2 months. Patient was admited to [facility]. Patient was found to be in Acute HF and an echo was done showing Severe mitral valve regurgitation and . Patient was then transferred to [facility] to 11/14 under Dr. Denson for Evaluation of MV regurgitation/endocarditis and VSD. Patient currently admits to coughing that has only been helped by Lasix that was being administered at [facility].  most Recent travel in may to [facility] currently denies Chest pain, fevers, chills, diarrhea, headaches, urinary or bowel changes.11/21 PREOP for AM 11/22.  Continue ceftriaxone / Gent ( last dose tonight per ID) / + Vanc x1 on call to OR Tuesday. NPO after MN. COVID pcr sent this AM /neg.   11/22/22 Mitral valve vegetations resected and replaced with 31mm tissue epic valve. VSD closed primarily    eXTUBATED D 0  prophylactic vanco , then resume  ceftriaxone and Gentamycin  Hyopoxia, likeley pain related, pain management  D/c MS chest tubes as drainage decreases.  iNSULIN  INFUSION  Johnson out , DTV  Plan to place PICC then start eliquis  11/23 transferred to sdu  11/24 R ct with drainage will leave in place.  L ct minimal, plan to d/c today.  Picc placed  11/25 R pleural tube removed Eliquis ^  5 mg bid MVR tissue Await abx reqs/length to prepare for DC home  11/26 ABX thru 1/3/23  Eliquis bid tissue valve Await DC planning abx infusion authorization  11/27  Ambulating independently ABX thru 1/3/23  Home w/ [facility] infusion Monday

## 2022-11-27 NOTE — PROGRESS NOTE ADULT - PROBLEM SELECTOR PROBLEM 1
Status post infectious endocarditis
Mitral regurgitation
Status post infectious endocarditis
Mitral regurgitation
Status post infectious endocarditis
Status post infectious endocarditis
Mitral regurgitation
Status post infectious endocarditis
Mitral regurgitation

## 2022-11-27 NOTE — PROGRESS NOTE ADULT - SUBJECTIVE AND OBJECTIVE BOX
Subjective: "Morning"  OOB chair, ambulating independently      Tele:  SR  70s           V/S:                    T(F): 97.9 (22 @ 07:19), Max: 98.3 (22 @ 22:37)  HR: 79 (22 @ 07:19) (71 - 84)  BP: 110/71 (22 @ 07:19) (110/71 - 124/72)  RR: 18 (22 @ 07:19) (18 - 19)  SpO2: 95% (22 @ 07:19) (93% - 99%)  Wt(kg): --      LV EF:      Labs:      137  |  102  |  16  ----------------------------<  83  4.6   |  26  |  0.88    Ca    9.0      2022 06:53  Phos  4.3       Mg     2.2         TPro  6.4  /  Alb  3.2<L>  /  TBili  0.3  /  DBili  x   /  AST  18  /  ALT  24  /  AlkPhos  79                                 9.5    10.21 )-----------( 306      ( 2022 06:53 )             30.1             CAPILLARY BLOOD GLUCOSE      POCT Blood Glucose.: 107 mg/dL (2022 20:28)  POCT Blood Glucose.: 90 mg/dL (2022 16:45)  POCT Blood Glucose.: 111 mg/dL (2022 11:48)           CXR:    I&O's Detail    2022 07:01  -  2022 07:00  --------------------------------------------------------  IN:    IV PiggyBack: 100 mL    IV PiggyBack: 50 mL    Oral Fluid: 240 mL  Total IN: 390 mL    OUT:    Voided (mL): 800 mL  Total OUT: 800 mL    Total NET: -410 mL      2022 07:01  -  2022 09:56  --------------------------------------------------------  IN:    Oral Fluid: 240 mL  Total IN: 240 mL    OUT:    Voided (mL): 200 mL  Total OUT: 200 mL    Total NET: 40 mL    BOWEL MOVEMENT:  [x ] YES [ ] NO      Daily     Daily Weight in k.1 (2022 06:53)  Medications:  acetaminophen     Tablet .. 650 milliGRAM(s) Oral every 6 hours PRN  acetaminophen     Tablet .. 975 milliGRAM(s) Oral every 8 hours PRN  apixaban 5 milliGRAM(s) Oral two times a day  aspirin enteric coated 81 milliGRAM(s) Oral daily  bisacodyl 5 milliGRAM(s) Oral daily PRN  bisacodyl Suppository 10 milliGRAM(s) Rectal once  cefTRIAXone   IVPB 2000 milliGRAM(s) IV Intermittent every 24 hours  chlorhexidine 2% Cloths 1 Application(s) Topical daily  gentamicin   IVPB 60 milliGRAM(s) IV Intermittent every 8 hours  oxyCODONE    IR 5 milliGRAM(s) Oral every 4 hours PRN  pantoprazole    Tablet 40 milliGRAM(s) Oral before breakfast  polyethylene glycol 3350 17 Gram(s) Oral daily  senna 2 Tablet(s) Oral at bedtime  sodium chloride 0.9%. 1000 milliLiter(s) IV Continuous <Continuous>        Physical Exam:    Neurology: alert and oriented x 3    CV : s1 s2 RRR    Sternal Wound :  Op site dsg removed Incision clean    Lungs: Diminished though clear bilaterally    Abdomen: soft, nontender, nondistended, positive bowel sounds, BM last evening    :    voiding       Extremities:   trace lower extrem edema, warm, well perfused  RUE  PICC in place                   PAST MEDICAL & SURGICAL HISTORY:  Murmur      VSD (ventricular septal defect)

## 2022-11-27 NOTE — PROGRESS NOTE ADULT - PROBLEM SELECTOR PLAN 1
Cont Rocephin/ resume gent q8h  ID following  Trend WBC, improving leukocytosis
TAVO @ [facility] : Severe mitral valve regurgitation 2/2 flail leaflet with possible small vegetation on the anterior MV leaflet that could not be ruled out,     DR Hennessy following>Continue ceftriaxone / Gent  Repeat blood cultures x 2 this am  c/w lopressor 12.5 MG BID  OR 11/22
TEEin [facility]   showing: Severe mitral valve regurgitation 2/2 flail leaflet with possible small vegetation on the anterior MV leaflet that could not be ruled out,   DR Hennessy changed abx   to ceftriaxone today  repeat TTE today  DW   Dr perez need for TAVO?   one complete in [facility]  Preop workup in progress  OR date TBD  c/w lopressor 12.5 MG BID   MR   MRA  brain  ro abscess   in setting of endocarditis    Dr. Perez to see patient   thursday
Cont Rocephin/ resume gent q8h  ID following  Trend WBC, improving leukocytosis
TAVO @ [facility] : Severe mitral valve regurgitation 2/2 flail leaflet with possible small vegetation on the anterior MV leaflet that could not be ruled out,     DR Hennessy following>Continue ceftriaxone / Gent  MR brain this am  Preop workup in progress>OR date TBD  c/w lopressor 12.5 MG BID    Dr. Doll to see patient   thursday
Cont abx, prphylactic vanco the resume gentamycin  Cont ceftriaxone.  D/c MS ct
TAVO @ [facility] : Severe mitral valve regurgitation 2/2 flail leaflet with possible small vegetation on the anterior MV leaflet that could not be ruled out,     DR Hennessy following>Continue ceftriaxone / Gent  Repeat blood cultures x 2 am  c/w lopressor 12.5 MG BID  OR 11/22
Cont abx, prphylactic vanco the resume gentamycin  Cont ceftriaxone.  D/c MS ct
TAVO @ [facility] : Severe mitral valve regurgitation 2/2 flail leaflet with possible small vegetation on the anterior MV leaflet that could not be ruled out,     DR Hennessy following>Continue ceftriaxone / Gent    c/w lopressor 12.5 MG BID  OR 11/22
TAVO @ [facility] : Severe mitral valve regurgitation 2/2 flail leaflet with possible small vegetation on the anterior MV leaflet that could not be ruled out,     DR Hennessy following>Continue ceftriaxone / Gent  Repeat blood cultures x 2 this am  c/w lopressor 12.5 MG BID  OR 11/22
Cont Rocephin/ resume gent q8h  ID following  Trend WBC, improving leukocytosis

## 2022-11-27 NOTE — PROGRESS NOTE ADULT - PROBLEM SELECTOR PROBLEM 2
VSD (ventricular septal defect)
VSD (ventricular septal defect)
S/P MVR (mitral valve replacement)
VSD (ventricular septal defect)
VSD (ventricular septal defect)
S/P MVR (mitral valve replacement)
VSD (ventricular septal defect)
S/P MVR (mitral valve replacement)
VSD (ventricular septal defect)

## 2022-11-27 NOTE — PROGRESS NOTE ADULT - PROBLEM SELECTOR PLAN 2
DC right pleural tube  Increase Eliquis 5 bid tissue valve  Increase activity  ERP protocol  Cathartic constipation
c/w lopressor 12.5 MG BID
Eliquis 5 bid tissue valve  Increase activity  ERP protocol  Shower
Eliquis 5 bid tissue valve  Shower
c/w lopressor 12.5 MG BID

## 2022-11-28 ENCOUNTER — TRANSCRIPTION ENCOUNTER (OUTPATIENT)
Age: 32
End: 2022-11-28

## 2022-11-28 VITALS
RESPIRATION RATE: 18 BRPM | SYSTOLIC BLOOD PRESSURE: 117 MMHG | HEART RATE: 90 BPM | TEMPERATURE: 98 F | DIASTOLIC BLOOD PRESSURE: 74 MMHG | OXYGEN SATURATION: 96 %

## 2022-11-28 PROBLEM — Q21.0 VENTRICULAR SEPTAL DEFECT: Chronic | Status: ACTIVE | Noted: 2022-11-15

## 2022-11-28 LAB
ALBUMIN SERPL ELPH-MCNC: 3.1 G/DL — LOW (ref 3.3–5)
ALP SERPL-CCNC: 77 U/L — SIGNIFICANT CHANGE UP (ref 40–120)
ALT FLD-CCNC: 25 U/L — SIGNIFICANT CHANGE UP (ref 10–45)
ANION GAP SERPL CALC-SCNC: 11 MMOL/L — SIGNIFICANT CHANGE UP (ref 5–17)
AST SERPL-CCNC: 21 U/L — SIGNIFICANT CHANGE UP (ref 10–40)
BILIRUB SERPL-MCNC: 0.3 MG/DL — SIGNIFICANT CHANGE UP (ref 0.2–1.2)
BUN SERPL-MCNC: 10 MG/DL — SIGNIFICANT CHANGE UP (ref 7–23)
CALCIUM SERPL-MCNC: 9.1 MG/DL — SIGNIFICANT CHANGE UP (ref 8.4–10.5)
CHLORIDE SERPL-SCNC: 100 MMOL/L — SIGNIFICANT CHANGE UP (ref 96–108)
CO2 SERPL-SCNC: 25 MMOL/L — SIGNIFICANT CHANGE UP (ref 22–31)
CREAT SERPL-MCNC: 0.8 MG/DL — SIGNIFICANT CHANGE UP (ref 0.5–1.3)
EGFR: 100 ML/MIN/1.73M2 — SIGNIFICANT CHANGE UP
GLUCOSE SERPL-MCNC: 86 MG/DL — SIGNIFICANT CHANGE UP (ref 70–99)
HCT VFR BLD CALC: 28.8 % — LOW (ref 34.5–45)
HGB BLD-MCNC: 9.1 G/DL — LOW (ref 11.5–15.5)
MAGNESIUM SERPL-MCNC: 1.8 MG/DL — SIGNIFICANT CHANGE UP (ref 1.6–2.6)
MCHC RBC-ENTMCNC: 26.8 PG — LOW (ref 27–34)
MCHC RBC-ENTMCNC: 31.6 GM/DL — LOW (ref 32–36)
MCV RBC AUTO: 84.7 FL — SIGNIFICANT CHANGE UP (ref 80–100)
NRBC # BLD: 0 /100 WBCS — SIGNIFICANT CHANGE UP (ref 0–0)
PHOSPHATE SERPL-MCNC: 4.1 MG/DL — SIGNIFICANT CHANGE UP (ref 2.5–4.5)
PLATELET # BLD AUTO: 315 K/UL — SIGNIFICANT CHANGE UP (ref 150–400)
POTASSIUM SERPL-MCNC: 4.3 MMOL/L — SIGNIFICANT CHANGE UP (ref 3.5–5.3)
POTASSIUM SERPL-SCNC: 4.3 MMOL/L — SIGNIFICANT CHANGE UP (ref 3.5–5.3)
PROT SERPL-MCNC: 6.5 G/DL — SIGNIFICANT CHANGE UP (ref 6–8.3)
RBC # BLD: 3.4 M/UL — LOW (ref 3.8–5.2)
RBC # FLD: 15.4 % — HIGH (ref 10.3–14.5)
SODIUM SERPL-SCNC: 136 MMOL/L — SIGNIFICANT CHANGE UP (ref 135–145)
WBC # BLD: 8.84 K/UL — SIGNIFICANT CHANGE UP (ref 3.8–10.5)
WBC # FLD AUTO: 8.84 K/UL — SIGNIFICANT CHANGE UP (ref 3.8–10.5)

## 2022-11-28 PROCEDURE — 87015 SPECIMEN INFECT AGNT CONCNTJ: CPT

## 2022-11-28 PROCEDURE — A9585: CPT

## 2022-11-28 PROCEDURE — P9059: CPT

## 2022-11-28 PROCEDURE — 84134 ASSAY OF PREALBUMIN: CPT

## 2022-11-28 PROCEDURE — 97161 PT EVAL LOW COMPLEX 20 MIN: CPT

## 2022-11-28 PROCEDURE — C1769: CPT

## 2022-11-28 PROCEDURE — U0003: CPT

## 2022-11-28 PROCEDURE — C1751: CPT

## 2022-11-28 PROCEDURE — 82565 ASSAY OF CREATININE: CPT

## 2022-11-28 PROCEDURE — 87102 FUNGUS ISOLATION CULTURE: CPT

## 2022-11-28 PROCEDURE — 93005 ELECTROCARDIOGRAM TRACING: CPT

## 2022-11-28 PROCEDURE — 94002 VENT MGMT INPAT INIT DAY: CPT

## 2022-11-28 PROCEDURE — 85610 PROTHROMBIN TIME: CPT

## 2022-11-28 PROCEDURE — C9399: CPT

## 2022-11-28 PROCEDURE — 97166 OT EVAL MOD COMPLEX 45 MIN: CPT

## 2022-11-28 PROCEDURE — 85025 COMPLETE CBC W/AUTO DIFF WBC: CPT

## 2022-11-28 PROCEDURE — 82435 ASSAY OF BLOOD CHLORIDE: CPT

## 2022-11-28 PROCEDURE — 88305 TISSUE EXAM BY PATHOLOGIST: CPT

## 2022-11-28 PROCEDURE — L8699: CPT

## 2022-11-28 PROCEDURE — 83605 ASSAY OF LACTIC ACID: CPT

## 2022-11-28 PROCEDURE — 36569 INSJ PICC 5 YR+ W/O IMAGING: CPT

## 2022-11-28 PROCEDURE — 84443 ASSAY THYROID STIM HORMONE: CPT

## 2022-11-28 PROCEDURE — 84295 ASSAY OF SERUM SODIUM: CPT

## 2022-11-28 PROCEDURE — P9047: CPT

## 2022-11-28 PROCEDURE — 85014 HEMATOCRIT: CPT

## 2022-11-28 PROCEDURE — 87116 MYCOBACTERIA CULTURE: CPT

## 2022-11-28 PROCEDURE — 86891 AUTOLOGOUS BLOOD OP SALVAGE: CPT

## 2022-11-28 PROCEDURE — 86901 BLOOD TYPING SEROLOGIC RH(D): CPT

## 2022-11-28 PROCEDURE — 70545 MR ANGIOGRAPHY HEAD W/DYE: CPT

## 2022-11-28 PROCEDURE — 85384 FIBRINOGEN ACTIVITY: CPT

## 2022-11-28 PROCEDURE — 82803 BLOOD GASES ANY COMBINATION: CPT

## 2022-11-28 PROCEDURE — 85396 CLOTTING ASSAY WHOLE BLOOD: CPT

## 2022-11-28 PROCEDURE — 84132 ASSAY OF SERUM POTASSIUM: CPT

## 2022-11-28 PROCEDURE — 88312 SPECIAL STAINS GROUP 1: CPT

## 2022-11-28 PROCEDURE — 84484 ASSAY OF TROPONIN QUANT: CPT

## 2022-11-28 PROCEDURE — 84439 ASSAY OF FREE THYROXINE: CPT

## 2022-11-28 PROCEDURE — 85520 HEPARIN ASSAY: CPT

## 2022-11-28 PROCEDURE — 87640 STAPH A DNA AMP PROBE: CPT

## 2022-11-28 PROCEDURE — 82962 GLUCOSE BLOOD TEST: CPT

## 2022-11-28 PROCEDURE — 80048 BASIC METABOLIC PNL TOTAL CA: CPT

## 2022-11-28 PROCEDURE — 86850 RBC ANTIBODY SCREEN: CPT

## 2022-11-28 PROCEDURE — 85018 HEMOGLOBIN: CPT

## 2022-11-28 PROCEDURE — 71045 X-RAY EXAM CHEST 1 VIEW: CPT

## 2022-11-28 PROCEDURE — 36415 COLL VENOUS BLD VENIPUNCTURE: CPT

## 2022-11-28 PROCEDURE — 93880 EXTRACRANIAL BILAT STUDY: CPT

## 2022-11-28 PROCEDURE — 80170 ASSAY OF GENTAMICIN: CPT

## 2022-11-28 PROCEDURE — 87077 CULTURE AEROBIC IDENTIFY: CPT

## 2022-11-28 PROCEDURE — 85730 THROMBOPLASTIN TIME PARTIAL: CPT

## 2022-11-28 PROCEDURE — P9016: CPT

## 2022-11-28 PROCEDURE — 94010 BREATHING CAPACITY TEST: CPT

## 2022-11-28 PROCEDURE — 87641 MR-STAPH DNA AMP PROBE: CPT

## 2022-11-28 PROCEDURE — 82550 ASSAY OF CK (CPK): CPT

## 2022-11-28 PROCEDURE — 81001 URINALYSIS AUTO W/SCOPE: CPT

## 2022-11-28 PROCEDURE — U0005: CPT

## 2022-11-28 PROCEDURE — 87040 BLOOD CULTURE FOR BACTERIA: CPT

## 2022-11-28 PROCEDURE — 83036 HEMOGLOBIN GLYCOSYLATED A1C: CPT

## 2022-11-28 PROCEDURE — 86923 COMPATIBILITY TEST ELECTRIC: CPT

## 2022-11-28 PROCEDURE — 82553 CREATINE MB FRACTION: CPT

## 2022-11-28 PROCEDURE — 82330 ASSAY OF CALCIUM: CPT

## 2022-11-28 PROCEDURE — 93306 TTE W/DOPPLER COMPLETE: CPT

## 2022-11-28 PROCEDURE — 84702 CHORIONIC GONADOTROPIN TEST: CPT

## 2022-11-28 PROCEDURE — 80053 COMPREHEN METABOLIC PANEL: CPT

## 2022-11-28 PROCEDURE — 83880 ASSAY OF NATRIURETIC PEPTIDE: CPT

## 2022-11-28 PROCEDURE — 82947 ASSAY GLUCOSE BLOOD QUANT: CPT

## 2022-11-28 PROCEDURE — 86900 BLOOD TYPING SEROLOGIC ABO: CPT

## 2022-11-28 PROCEDURE — 85027 COMPLETE CBC AUTOMATED: CPT

## 2022-11-28 PROCEDURE — C1889: CPT

## 2022-11-28 PROCEDURE — 87206 SMEAR FLUORESCENT/ACID STAI: CPT

## 2022-11-28 PROCEDURE — 87070 CULTURE OTHR SPECIMN AEROBIC: CPT

## 2022-11-28 PROCEDURE — 74177 CT ABD & PELVIS W/CONTRAST: CPT

## 2022-11-28 PROCEDURE — 70552 MRI BRAIN STEM W/DYE: CPT

## 2022-11-28 PROCEDURE — 99232 SBSQ HOSP IP/OBS MODERATE 35: CPT

## 2022-11-28 PROCEDURE — 84100 ASSAY OF PHOSPHORUS: CPT

## 2022-11-28 PROCEDURE — 83735 ASSAY OF MAGNESIUM: CPT

## 2022-11-28 PROCEDURE — 71260 CT THORAX DX C+: CPT

## 2022-11-28 PROCEDURE — 87075 CULTR BACTERIA EXCEPT BLOOD: CPT

## 2022-11-28 RX ORDER — PANTOPRAZOLE SODIUM 20 MG/1
1 TABLET, DELAYED RELEASE ORAL
Qty: 30 | Refills: 0
Start: 2022-11-28 | End: 2022-12-27

## 2022-11-28 RX ORDER — GENTAMICIN SULFATE 40 MG/ML
30 VIAL (ML) INJECTION
Qty: 0 | Refills: 0 | DISCHARGE
Start: 2022-11-28

## 2022-11-28 RX ORDER — SENNA PLUS 8.6 MG/1
2 TABLET ORAL
Qty: 60 | Refills: 0
Start: 2022-11-28 | End: 2022-12-27

## 2022-11-28 RX ORDER — OXYCODONE HYDROCHLORIDE 5 MG/1
1 TABLET ORAL
Qty: 30 | Refills: 0
Start: 2022-11-28 | End: 2022-12-03

## 2022-11-28 RX ORDER — GENTAMICIN SULFATE 40 MG/ML
160 VIAL (ML) INJECTION
Qty: 0 | Refills: 0 | DISCHARGE

## 2022-11-28 RX ORDER — CEFTRIAXONE 500 MG/1
2000 INJECTION, POWDER, FOR SOLUTION INTRAMUSCULAR; INTRAVENOUS
Qty: 0 | Refills: 0 | DISCHARGE
Start: 2022-11-28

## 2022-11-28 RX ORDER — APIXABAN 2.5 MG/1
1 TABLET, FILM COATED ORAL
Qty: 60 | Refills: 0
Start: 2022-11-28 | End: 2022-12-27

## 2022-11-28 RX ORDER — MAGNESIUM SULFATE 500 MG/ML
2 VIAL (ML) INJECTION ONCE
Refills: 0 | Status: COMPLETED | OUTPATIENT
Start: 2022-11-28 | End: 2022-11-28

## 2022-11-28 RX ORDER — ASPIRIN/CALCIUM CARB/MAGNESIUM 324 MG
1 TABLET ORAL
Qty: 30 | Refills: 0
Start: 2022-11-28 | End: 2022-12-27

## 2022-11-28 RX ADMIN — Medication 103 MILLIGRAM(S): at 05:03

## 2022-11-28 RX ADMIN — OXYCODONE HYDROCHLORIDE 5 MILLIGRAM(S): 5 TABLET ORAL at 07:45

## 2022-11-28 RX ADMIN — OXYCODONE HYDROCHLORIDE 5 MILLIGRAM(S): 5 TABLET ORAL at 07:15

## 2022-11-28 RX ADMIN — OXYCODONE HYDROCHLORIDE 5 MILLIGRAM(S): 5 TABLET ORAL at 11:31

## 2022-11-28 RX ADMIN — Medication 81 MILLIGRAM(S): at 11:24

## 2022-11-28 RX ADMIN — OXYCODONE HYDROCHLORIDE 5 MILLIGRAM(S): 5 TABLET ORAL at 12:01

## 2022-11-28 RX ADMIN — Medication 150 GRAM(S): at 11:23

## 2022-11-28 RX ADMIN — CHLORHEXIDINE GLUCONATE 1 APPLICATION(S): 213 SOLUTION TOPICAL at 11:24

## 2022-11-28 RX ADMIN — CEFTRIAXONE 100 MILLIGRAM(S): 500 INJECTION, POWDER, FOR SOLUTION INTRAMUSCULAR; INTRAVENOUS at 09:32

## 2022-11-28 RX ADMIN — PANTOPRAZOLE SODIUM 40 MILLIGRAM(S): 20 TABLET, DELAYED RELEASE ORAL at 05:04

## 2022-11-28 RX ADMIN — APIXABAN 5 MILLIGRAM(S): 2.5 TABLET, FILM COATED ORAL at 05:03

## 2022-11-28 NOTE — PROGRESS NOTE ADULT - SUBJECTIVE AND OBJECTIVE BOX
infectious diseases progress note:    Patient is a 32y old  Female who presents with a chief complaint of MV Endocarditis/ VSD  MVR VSD Closure (27 Nov 2022 09:56)        Shortness of breath          ROS:  CONSTITUTIONAL:  Negative fever or chills, feels well, good appetite  EYES:  Negative  blurry vision or double vision  CARDIOVASCULAR:  Negative for chest pain or palpitations  RESPIRATORY:  Negative for cough, wheezing, or SOB   GASTROINTESTINAL:  Negative for nausea, vomiting, diarrhea, constipation, or abdominal pain  GENITOURINARY:  Negative frequency, urgency or dysuria  NEUROLOGIC:  No headache, confusion, dizziness, lightheadedness    Allergies    No Known Allergies    Intolerances        ANTIBIOTICS/RELEVANT:  antimicrobials  cefTRIAXone   IVPB 2000 milliGRAM(s) IV Intermittent every 24 hours  gentamicin   IVPB 60 milliGRAM(s) IV Intermittent every 8 hours    immunologic:    OTHER:  acetaminophen     Tablet .. 650 milliGRAM(s) Oral every 6 hours PRN  acetaminophen     Tablet .. 975 milliGRAM(s) Oral every 8 hours PRN  apixaban 5 milliGRAM(s) Oral two times a day  aspirin enteric coated 81 milliGRAM(s) Oral daily  bisacodyl 5 milliGRAM(s) Oral daily PRN  bisacodyl Suppository 10 milliGRAM(s) Rectal once  chlorhexidine 2% Cloths 1 Application(s) Topical daily  oxyCODONE    IR 5 milliGRAM(s) Oral every 4 hours PRN  pantoprazole    Tablet 40 milliGRAM(s) Oral before breakfast  polyethylene glycol 3350 17 Gram(s) Oral daily  senna 2 Tablet(s) Oral at bedtime  sodium chloride 0.9%. 1000 milliLiter(s) IV Continuous <Continuous>      Objective:  Vital Signs Last 24 Hrs  T(C): 36.7 (28 Nov 2022 06:58), Max: 37.3 (27 Nov 2022 22:50)  T(F): 98 (28 Nov 2022 06:58), Max: 99.2 (27 Nov 2022 22:50)  HR: 87 (28 Nov 2022 06:58) (81 - 88)  BP: 125/66 (28 Nov 2022 06:58) (114/66 - 137/74)  BP(mean): 88 (28 Nov 2022 06:58) (84 - 100)  RR: 18 (28 Nov 2022 06:58) (18 - 18)  SpO2: 95% (28 Nov 2022 06:58) (94% - 96%)    Parameters below as of 28 Nov 2022 06:58  Patient On (Oxygen Delivery Method): room air         Eyes:JAMIE, EOMI  Ear/Nose/Throat: no oral lesion, no sinus tenderness on percussion	  Neck:no JVD, no lymphadenopathy, supple  Respiratory: CTA sobeida  Cardiovascular: S1S2 RRR, no murmurs  Gastrointestinal:soft, (+) BS, no HSM  Extremities:no e/e/c        LABS:                        9.1    8.84  )-----------( 315      ( 28 Nov 2022 06:09 )             28.8     11-28    136  |  100  |  10  ----------------------------<  86  4.3   |  25  |  0.80    Ca    9.1      28 Nov 2022 06:11  Phos  4.1     11-28  Mg     1.8     11-28    TPro  6.5  /  Alb  3.1<L>  /  TBili  0.3  /  DBili  x   /  AST  21  /  ALT  25  /  AlkPhos  77  11-28            MICROBIOLOGY:    RECENT CULTURES:  11-22 @ 21:23 .Tissue Other       Rare polymorphonuclear leukocytes seen per low power field  No organisms seen per oil power field           Culture is being performed.          RESPIRATORY CULTURES:              RADIOLOGY & ADDITIONAL STUDIES:        Pager [phone]  After 5 pm/weekends or if no response :[phone]

## 2022-11-28 NOTE — DISCHARGE NOTE NURSING/CASE MANAGEMENT/SOCIAL WORK - NSDCPEFALRISK_GEN_ALL_CORE
For information on Fall & Injury Prevention, visit: [email or web address] OR  [email or web address] OR  https://www.cdc.gov/steadi/patient.html

## 2022-11-28 NOTE — PROGRESS NOTE ADULT - REASON FOR ADMISSION
MV Endocarditis/ VSD
MV Endocarditis/ VSD  MVR VSD Closure
MV Endocarditis/ VSD
MV Endocarditis/ VSD  MVR VSD repair 11/22
MV Endocarditis/ VSD
MV Endocarditis/ VSD
S/P  MVR >MV Endocarditis/ VSD
MV Endocarditis/ VSD

## 2022-11-28 NOTE — DISCHARGE NOTE PROVIDER - CARE PROVIDERS DIRECT ADDRESSES
[email or web address]

## 2022-11-28 NOTE — DISCHARGE NOTE NURSING/CASE MANAGEMENT/SOCIAL WORK - NSSCTYPOFSERV_GEN_ALL_CORE
Visiting nurse will call 1-2 days to arrange visit. Please call agency with any questions or concerns

## 2022-11-28 NOTE — PROGRESS NOTE ADULT - PROVIDER SPECIALTY LIST ADULT
Infectious Disease
CT Surgery
CT Surgery
Critical Care
Critical Care
Infectious Disease
CT Surgery
Infectious Disease
Infectious Disease
CT Surgery

## 2022-11-28 NOTE — DISCHARGE NOTE PROVIDER - NSDCMRMEDTOKEN_GEN_ALL_CORE_FT
apixaban 5 mg oral tablet: 1 tab(s) orally 2 times a day  aspirin 81 mg oral delayed release tablet: 1 tab(s) orally once a day  cefTRIAXone: 2000 milligram(s) intravenous once a day  gentamicin 100 mg/50 mL-NaCl 0.9% intravenous solution: 30 milliliter(s) intravenous every 8 hours  oxyCODONE 5 mg oral tablet: 1 tab(s) orally every 4 hours, As needed, Moderate Pain (4 - 6) MDD:5  pantoprazole 40 mg oral delayed release tablet: 1 tab(s) orally once a day (before a meal)  senna leaf extract oral tablet: 2 tab(s) orally once a day (at bedtime)   apixaban 5 mg oral tablet: 1 tab(s) orally 2 times a day  aspirin 81 mg oral delayed release tablet: 1 tab(s) orally once a day  cefTRIAXone: 2000 milligram(s) intravenous once a day  gentamicin: 180 milligram(s) intravenous once a day  oxyCODONE 5 mg oral tablet: 1 tab(s) orally every 4 hours, As needed, Moderate Pain (4 - 6) MDD:5  pantoprazole 40 mg oral delayed release tablet: 1 tab(s) orally once a day (before a meal)  senna leaf extract oral tablet: 2 tab(s) orally once a day (at bedtime)

## 2022-11-28 NOTE — PROGRESS NOTE ADULT - ASSESSMENT
32 year old with history of unrepaired VSD presents with sob, cough , hemoptysis , fever, fatigue to [facility]. 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 She has no dental problems and no recent dental work except for replacement of a cap months ago   Denies HA, change i mental status  Feels better since ab started but having very bad burning fro m the pcn infusion.   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 VSD/MR    She underwent repair of her VSD and replacement of the MV for her gemella endocarditis   she is now awake, alert, extubated          OR cultures negative   Over two weeks into therapy  ready to go home  It will be difficult for her to do the getna q 8 hrs at home  Atthis point I think it is reasonable to complete the antibiotics with ceftriaxone and once daily genta at a dose of 180 mg  plan 4 week total, miller or about Dec 14,  ptr aware of yusef and vestibular toxicity  and will notify me immediately of any symptoms   folow up in office after completing  y of ab

## 2022-11-28 NOTE — DISCHARGE NOTE NURSING/CASE MANAGEMENT/SOCIAL WORK - PATIENT PORTAL LINK FT
You can access the FollowMyHealth Patient Portal offered by [facility] by registering at the following website: [email or web address]. By joining [employer]’s FollowMyHealth portal, you will also be able to view your health information using other applications (apps) compatible with our system.

## 2022-11-28 NOTE — DISCHARGE NOTE PROVIDER - HOSPITAL COURSE
31 yo F w PMH of congenital VSD (uncorrected) presents to [facility] CTS for 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 Initially patient presented to the [facility] ED w chronic cough, SOB, hematemesis & hemoptysis. Pt complained of  chronic cough for the past 2 months, initially non-productive, now slightly productive associated with SOB. Initially she was able to climb one flight of stairs, but the SOB & cough have worsened over the past 3 weeks, now claims that she would be short of breath if she walked 5ft She was seen by her PCP & pulmonologist for the cough & sob and was advised to use inhalers and azithromycin with no relief. Wednesda night 11/9 into Thursday AM 11/10 her cough worsened and also had an episode of vomiting, found 10-15 ml of blood mixed with the vomitus. Her cough episode later showed sputum w blood streaks. Claims that this was new, and never happened in the past 2 months. Patient was admited to [facility]. Patient was found to be in Acute HF and an echo was done showing Severe mitral valve regurgitation and . Patient was then transferred to [facility] to 11/14 under Dr. Denson for Evaluation of MV regurgitation/endocarditis and VSD. Patient currently admits to coughing that has only been helped by Lasix that was being administered at [facility].  most Recent travel in may to [facility] currently denies Chest pain, fevers, chills, diarrhea, headaches, urinary or bowel changes.11/21 PREOP for AM 11/22.  Continue ceftriaxone / Gent ( last dose tonight per ID) / + Vanc x1 on call to OR Tuesday. NPO after MN. COVID pcr sent this AM /neg.   11/22/22 Mitral valve vegetations resected and replaced with 31mm tissue epic valve. VSD closed primarily    eXTUBATED D 0  prophylactic vanco , then resume  ceftriaxone and Gentamycin  Hyopoxia, likeley pain related, pain management  D/c MS chest tubes as drainage decreases.  iNSULIN  INFUSION  Johnson out , DTV  Plan to place PICC then start eliquis  11/23 transferred to [facility]du  11/24 R ct with drainage will leave in place.  L ct minimal, plan to d/c today.  Picc placed  11/25 R pleural tube removed Eliquis ^  5 mg bid MVR tissue Await abx reqs/length to prepare for DC home  11/26 ABX thru 1/3/23  Eliquis bid tissue valve Await DC planning abx infusion authorization  11/27  Ambulating independently ABX thru 1/3/23  Home w/ [facility] infusion Monday 11/28 d/c plannoing after am antibiotics. 31 yo F w PMH of congenital VSD (uncorrected) presents to [facility] CTS for 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 Initially patient presented to the [facility] ED w chronic cough, SOB, hematemesis & hemoptysis. Pt complained of  chronic cough for the past 2 months, initially non-productive, now slightly productive associated with SOB. Initially she was able to climb one flight of stairs, but the SOB & cough have worsened over the past 3 weeks, now claims that she would be short of breath if she walked 5ft She was seen by her PCP & pulmonologist for the cough & sob and was advised to use inhalers and azithromycin with no relief. Wednesda night 11/9 into Thursday AM 11/10 her cough worsened and also had an episode of vomiting, found 10-15 ml of blood mixed with the vomitus. Her cough episode later showed sputum w blood streaks. Claims that this was new, and never happened in the past 2 months. Patient was admited to [facility]. Patient was found to be in Acute HF and an echo was done showing Severe mitral valve regurgitation and . Patient was then transferred to [facility] to 11/14 under Dr. Denson for Evaluation of MV regurgitation/endocarditis and VSD. Patient currently admits to coughing that has only been helped by Lasix that was being administered at [facility].  most Recent travel in may to [facility] currently denies Chest pain, fevers, chills, diarrhea, headaches, urinary or bowel changes.11/21 PREOP for AM 11/22.  Continue ceftriaxone / Gent ( last dose tonight per ID) / + Vanc x1 on call to OR Tuesday. NPO after MN. COVID pcr sent this AM /neg.   11/22/22 Mitral valve vegetations resected and replaced with 31mm tissue epic valve. VSD closed primarily    eXTUBATED D 0  prophylactic vanco , then resume  ceftriaxone and Gentamycin  Hyopoxia, likeley pain related, pain management  D/c MS chest tubes as drainage decreases.  iNSULIN  INFUSION  Johnson out , DTV  Plan to place PICC then start eliquis  11/23 transferred to s[facility]  11/24 R ct with drainage will leave in place.  L ct minimal, plan to d/c today.  Picc placed  11/25 R pleural tube removed Eliquis ^  5 mg bid MVR tissue Await abx reqs/length to prepare for DC home  11/26 ABX thru 1/3/23  Eliquis bid tissue valve Await DC planning abx infusion authorization  11/27  Ambulating independently ABX thru 1/3/23  Home w/ [facility] infusion Monday 11/28 d/c plannoing after am antibiotics.  Gent changed to 180qd  l

## 2022-11-28 NOTE — DISCHARGE NOTE PROVIDER - CARE PROVIDER_API CALL
Yassine Doll)  Pediatric Cardiothoracic Surg; Thoracic Surgery  [address] [place] [address]  Phone: [phone]  Fax: [phone]  Scheduled Appointment: 12/09/2022 11:00 AM

## 2022-11-29 ENCOUNTER — NON-APPOINTMENT (OUTPATIENT)
Age: 32
End: 2022-11-29

## 2022-11-29 RX ORDER — MULTIVITAMIN
TABLET ORAL
Refills: 0 | Status: DISCONTINUED | COMMUNITY
End: 2022-11-29

## 2022-11-29 RX ORDER — NAPROXEN 500 MG/1
500 TABLET ORAL 3 TIMES DAILY
Qty: 90 | Refills: 0 | Status: DISCONTINUED | COMMUNITY
Start: 2022-09-13 | End: 2022-11-29

## 2022-11-30 ENCOUNTER — APPOINTMENT (OUTPATIENT)
Dept: CARE COORDINATION | Facility: HOME HEALTH | Age: 32
End: 2022-11-30

## 2022-11-30 VITALS
OXYGEN SATURATION: 96 % | HEART RATE: 77 BPM | RESPIRATION RATE: 14 BRPM | DIASTOLIC BLOOD PRESSURE: 56 MMHG | SYSTOLIC BLOOD PRESSURE: 110 MMHG

## 2022-11-30 LAB — SURGICAL PATHOLOGY STUDY: SIGNIFICANT CHANGE UP

## 2022-11-30 PROCEDURE — 99024 POSTOP FOLLOW-UP VISIT: CPT

## 2022-11-30 NOTE — ASSESSMENT
[FreeTextEntry1] : Pt recovering well at home s/p  MVR                . Reviewed all medications and dosages with pt understanding. Pt dispenses meds appropriately into med dispenser each week. Pt has all medications in home and is taking as prescribed. Denies pain at this time. No new symptoms, issues or concerns to report at this time. Appt schedule reviewed with pt verbalizing understanding.\par  Picc line in place, home care infusion services via [facility] being performed daily with appropriately prescribed IV abx.

## 2022-11-30 NOTE — PHYSICAL EXAM
[Neck Appearance] : the appearance of the neck was normal [Auscultation Breath Sounds / Voice Sounds] : lungs were clear to auscultation bilaterally [Heart Rate And Rhythm] : heart rate was normal and rhythm regular [Heart Sounds] : normal S1 and S2 [Heart Sounds Gallop] : no gallops [Murmurs] : no murmurs [Heart Sounds Pericardial Friction Rub] : no pericardial rub [Chest Visual Inspection Thoracic Asymmetry] : no chest asymmetry [Diminished Respiratory Excursion] : normal chest expansion [Right Carotid Bruit] : no bruit heard over the right carotid [Left Carotid Bruit] : no bruit heard over the left carotid [Right Femoral Bruit] : no bruit heard over the right femoral artery [Left Femoral Bruit] : no bruit heard over the left femoral artery [2+] : left 2+ [No Abnormalities] : the abdominal aorta was not enlarged and no bruit was heard [Abnormal Walk] : normal gait [Nail Clubbing] : no clubbing  or cyanosis of the fingernails [Musculoskeletal - Swelling] : no joint swelling seen [Motor Tone] : muscle strength and tone were normal [Skin Color & Pigmentation] : normal skin color and pigmentation [Skin Turgor] : normal skin turgor [] : no rash [FreeTextEntry1] : picc line in place R antecubital space, no erythema or edma noted. dressing changed 11/30 [Deep Tendon Reflexes (DTR)] : deep tendon reflexes were 2+ and symmetric [Sensation] : the sensory exam was normal to light touch and pinprick [No Focal Deficits] : no focal deficits [Oriented To Time, Place, And Person] : oriented to person, place, and time [Impaired Insight] : insight and judgment were intact [Affect] : the affect was normal

## 2022-11-30 NOTE — HISTORY OF PRESENT ILLNESS
[FreeTextEntry1] : 31 yo F w PMH of congenital VSD (uncorrected) presents to [facility] CTS for MV \par endocarditis/VSD eval. Initially patient presented to the [facility] ED w chronic \par cough, SOB, hematemesis & hemoptysis. Pt complained of  chronic cough for the \par past 2 months, initially non-productive, now slightly productive associated \par with SOB. Initially she was able to climb one flight of stairs, but the SOB & \par cough have worsened over the past 3 weeks, now claims that she would be short \par of breath if she walked 5ft She was seen by her PCP & pulmonologist for the \par cough & sob and was advised to use inhalers and azithromycin with no relief. \par Wednesda night 11/9 into Thursday AM 11/10 her cough worsened and also had an \par episode of vomiting, found 10-15 ml of blood mixed with the vomitus. Her cough \par episode later showed sputum w blood streaks. Claims that this was new, and \par never happened in the past 2 months. Patient was admited to [facility]. Patient was \par found to be in Acute HF and an echo was done showing Severe mitral valve \par regurgitation and . Patient was then transferred to [facility] to 11/14 under  \rene Denson for Evaluation of MV regurgitation/endocarditis and VSD. Patient \par currently admits to coughing that has only been helped by Lasix that was being \par administered at [facility].  most Recent travel in may to [facility] currently \par denies Chest pain, fevers, chills, diarrhea, headaches, urinary or bowel \par changes.11/21 PREOP for AM 11/22.  Continue ceftriaxone / Gent ( last dose \par tonight per ID) / + Vanc x1 on call to OR Tuesday. NPO after MN. COVID pcr sent \par this AM /neg. \par 11/22/22 Mitral valve vegetations resected and replaced with 31mm tissue epic \par valve. VSD closed primarily \par eXTUBATED D 0 \par prophylactic vanco , then resume  ceftriaxone and Gentamycin \par Hyopoxia, likeley pain related, pain management \par D/c MS chest tubes as drainage decreases. \par iNSULIN  INFUSION \par Johnson out , DTV \par Plan to place PICC then start eliquis \par 11/23 transferred to sdu \par 11/24 R ct with drainage will leave in place.  L ct minimal, plan to d/c today. \par Picc placed \par 11/25 R pleural tube removed Eliquis ^  5 mg bid MVR tissue Await abx \par reqs/length to prepare for DC home \par 11/26 ABX thru 1/3/23  Eliquis bid tissue valve Await DC planning abx infusion \par authorization \par 11/27  Ambulating independently ABX thru 1/3/23  Home w/ [facility] infusion \par Monday \par 11/28 d/c plannoing after am antibiotics. \par Gent changed to 180qd \par dscharged home in stable condition. \par l \par \par

## 2022-12-09 ENCOUNTER — APPOINTMENT (OUTPATIENT)
Dept: CARDIOTHORACIC SURGERY | Facility: CLINIC | Age: 32
End: 2022-12-09

## 2022-12-09 ENCOUNTER — APPOINTMENT (OUTPATIENT)
Dept: PEDIATRIC CARDIOLOGY | Facility: CLINIC | Age: 32
End: 2022-12-09

## 2022-12-09 PROCEDURE — 93325 DOPPLER ECHO COLOR FLOW MAPG: CPT

## 2022-12-09 PROCEDURE — 99024 POSTOP FOLLOW-UP VISIT: CPT

## 2022-12-09 PROCEDURE — 93320 DOPPLER ECHO COMPLETE: CPT

## 2022-12-09 PROCEDURE — 93303 ECHO TRANSTHORACIC: CPT

## 2022-12-12 ENCOUNTER — APPOINTMENT (OUTPATIENT)
Dept: INFECTIOUS DISEASE | Facility: CLINIC | Age: 32
End: 2022-12-12

## 2022-12-12 VITALS
RESPIRATION RATE: 14 BRPM | OXYGEN SATURATION: 100 % | DIASTOLIC BLOOD PRESSURE: 65 MMHG | SYSTOLIC BLOOD PRESSURE: 105 MMHG | WEIGHT: 143 LBS | BODY MASS INDEX: 25.34 KG/M2 | TEMPERATURE: 97.4 F | HEIGHT: 63 IN | HEART RATE: 88 BPM

## 2022-12-12 PROCEDURE — 99213 OFFICE O/P EST LOW 20 MIN: CPT

## 2022-12-12 NOTE — REVIEW OF SYSTEMS
EMERGENCY DEPARTMENT HISTORY AND PHYSICAL EXAM  This was created with voice recognition software and transcription errors may be present. 8:44 PM  Date: 7/15/2021  Patient Name: Mirna Hobson    History of Presenting Illness     Chief Complaint:    History Provided By:     HPI: Mirna Hobson is a 75 y.o. female past medical history of anxiety asbestos asthma COPD depression DVT lung disease osteoporosis who presents with shortness of breath. Patient states is been worsening now for weeks to months. She had a CT scan which showed multiple mucous plugs and was advised by her pulmonologist to come into the ER. That was at least a few weeks ago. She felt she was doing okay so she wanted to stay out from the hospital but is feeling worse now. No fevers or chills although temperature 100.0 here. No nausea or vomiting. No chest pain.   No other aggravating alleviating factors no other associated symptoms    PCP: João Betancur MD      Past History     Past Medical History:  Past Medical History:   Diagnosis Date    Anxiety     Arthritis     Asbestosis ([address]     Asthma     Back problem     Baker's cyst     Balance problems     Chronic lung disease     Cigarette smoker     Clotting disorder ([address]     COPD (chronic obstructive pulmonary disease) (HCC)     oxygen 2/liters asbestosis    Depression     History of DVT (deep vein thrombosis)     Leg pain     Right    Lung disease     Nausea & vomiting     Osteoporosis     Restless leg syndrome     Spinal stenosis     Thromboembolus ([address]     Vitamin D deficiency        Past Surgical History:  Past Surgical History:   Procedure Laterality Date    COLONOSCOPY N/A 9/22/2018    COLONOSCOPY w bx w polypectonies performed by Iona Duff MD at [address] [place] [address] N/A 11/6/2018    COLONOSCOPY performed by Edna Lozoya MD at [address] [place] [address] COLONOSCOPY N/A 8/2/2019    COLONOSCOPY with polypectomies and biopsies performed by Ghazala Lopes MD at [facility]COPY    HX APPENDECTOMY      HX BACK SURGERY      x4    HX BREAST BIOPSY      left    HX GI      exp lap and ileostomy    HX HEENT      cataract right    HX HYSTERECTOMY      HX LUMBAR LAMINECTOMY      HX MENISCUS REPAIR      HX ORTHOPAEDIC      Knee bakers cyst    HX POLYPECTOMY      right colectomy    HX TONSILLECTOMY      HX VEIN STRIPPING      VA LAP,SURG,COLECTOMY, PARTIAL, W/ANAST N/A 10/23/2018    Dr. Clark Jackson N/A 11/06/2018    Dr. Marya Montenegro      vein stripping       Family History:  Family History   Problem Relation Age of Onset    Cancer Father     Heart Disease Mother     Hypertension Mother     Cancer Brother     Cancer Sister     Diabetes Other     Hypertension Other     Stroke Other     Heart Disease Sister     Hypertension Sister     Heart Disease Brother        Social History:  Social History     Tobacco Use    Smoking status: Current Some Day Smoker     Packs/day: 1.00     Years: 50.00     Pack years: 50.00     Types: Cigarettes     Start date: 10/21/1964    Smokeless tobacco: Never Used   Vaping Use    Vaping Use: Never used   Substance Use Topics    Alcohol use: Never    Drug use: No       Allergies:   Allergies   Allergen Reactions    Anoro Ellipta [Umeclidinium-Vilanterol] Rash and Other (comments)     Muscle cramps in arms    Aspirin Other (comments)     GI upset and bleeding    Augmentin [Amoxicillin-Pot Clavulanate] Not Reported This Time     Possible cramping    Doxycycline Nausea and Vomiting    Morphine Other (comments)     Neurologic symptoms - severe agitation      Shellfish Derived Itching     Pt able to eat small amounts per report     Sulfa (Sulfonamide Antibiotics) Rash     Patient relates no longer allergic    Mellissa Christianity [Fluticasone Propion-Salmeterol] Other (comments)     Mouth Sores       Review of Systems     Review of Systems Respiratory: Positive for cough and shortness of breath. Negative for chest tightness. Cardiovascular: Negative for chest pain. All other systems reviewed and are negative. 10 point review of systems otherwise negative unless noted in HPI. Physical Exam       Physical Exam  Constitutional:       Appearance: She is well-developed. HENT:      Head: Normocephalic and atraumatic. Eyes:      Pupils: Pupils are equal, round, and reactive to light. Cardiovascular:      Rate and Rhythm: Normal rate and regular rhythm. Heart sounds: Normal heart sounds. No murmur heard. No friction rub. Pulmonary:      Effort: Pulmonary effort is normal. No respiratory distress. Breath sounds: Normal breath sounds. No wheezing. Comments: Bilateral expiratory wheeze  Abdominal:      General: There is no distension. Palpations: Abdomen is soft. Tenderness: There is no abdominal tenderness. There is no guarding or rebound. Musculoskeletal:         General: Normal range of motion. Cervical back: Normal range of motion and neck supple. Skin:     General: Skin is warm and dry. Neurological:      Mental Status: She is alert and oriented to person, place, and time. Psychiatric:         Behavior: Behavior normal.         Thought Content: Thought content normal.         Diagnostic Study Results     Vital Signs  EKG: EKG says 1 of sinus at 108 with a normal axis normal intervals there is no ST elevation or depression no hypertrophy. Potentially some lateral ST depressions. Unchanged from 2/11/2021  Labs:   Imaging: IMPRESSION  1. No evidence of pulmonary embolus or dissection.     2.  Tree-in-bud changes and consolidation with right lower lobe brachial  opacification and mild tree-in-bud changes on the left concerning for infectious  process.     3.   COPD changes.       Medical Decision Making     ED Course: Progress Notes, Reevaluation, and Consults:    I will be the provider of record for this patient. Provider Notes (Medical Decision Making): 76-year-old female presents with shortness of breath. She notes some getting worse for weeks now. She was advised to come to the hospital but was doing okay so she wanted to wait it out. We will start her on BiPAP and see if it helps her. Her oxygen sat is okay but she says shortness of breath is a 10 out of 10. May need a CTA to confirm no new PE although she is not hypotensive and something making her that short of breath are likely caused some sort of hypotension. Also noted to be febrile so sepsis protocol has been started. Also sending Covid    Discussed with Dr. Cyn Bowman will consult patient taken off BiPAP UA strep pneumo Legionella negative  Chemistry unremarkable troponin negative pro-Juanjo 0.32  White count 18 shift and 6 bands   pneumonia on chest CT       Diagnosis     Clinical Impression: No diagnosis found. Disposition:        Patient's Medications   Start Taking    No medications on file   Continue Taking    ACETAMINOPHEN (TYLENOL EXTRA STRENGTH) 500 MG TABLET    Take 500 mg by mouth every six (6) hours as needed for Pain. ALBUTEROL (PROVENTIL VENTOLIN) 2.5 MG /3 ML (0.083 %) NEBULIZER SOLUTION    3 mL by Nebulization route every four (4) hours as needed for Wheezing or Shortness of Breath. ICD: J44.9, R09.02 file under Medicare Part B    FAMOTIDINE (PEPCID) 20 MG TABLET    Take 20 mg by mouth nightly. FLUTICASONE PROPION-SALMETEROL (ADVAIR DISKUS) 250-50 MCG/DOSE DISKUS INHALER    Take 1 Puff by inhalation two (2) times a day. BRAND NAME ONLY    LORAZEPAM (ATIVAN) 1 MG TABLET    Take  by mouth two (2) times daily as needed for Anxiety. OXYGEN-AIR DELIVERY SYSTEMS    2 L by Does Not Apply route. O2 via nasal cannula with bedtime and activity. O2 Company: [employer]    PREDNISONE (DELTASONE) 10 MG TABLET    Take 10 mg by mouth daily (with breakfast). ROPINIROLE (REQUIP) 1 MG TABLET    Take 1 mg by mouth nightly. SIMETHICONE (GAS-X) 80 MG CHEWABLE TABLET    Take 80 mg by mouth every six (6) hours as needed for Flatulence. THERAPEUTIC MULTIVITAMIN (THERAGRAN) TABLET    Take 1 Tab by mouth daily. TIOTROPIUM BROMIDE (SPIRIVA RESPIMAT) 2.5 MCG/ACTUATION INHALER    Take 2 Puffs by inhalation daily.     VENTOLIN HFA 90 MCG/ACTUATION INHALER    inhale 2 puffs by mouth and INTO THE LUNGS every 4 hours if needed for wheezing shortness of breath   These Medications have changed    No medications on file   Stop Taking    No medications on file [Negative] : Heme/Lymph

## 2022-12-12 NOTE — HISTORY OF PRESENT ILLNESS
[FreeTextEntry1] :  doing great no problems cp with coughing no dizziness no change in hearing. \par no complaints

## 2022-12-12 NOTE — ASSESSMENT
[FreeTextEntry1] : doing well on ceftriaxone and gentas a for s. gemelli endocardtis  to complete both ab on Dec 16  4 weeks post last positive bc  OR cultures negative  4 weeks should be fine  strongly advised flu shot but she refused

## 2022-12-12 NOTE — PHYSICAL EXAM
[General Appearance - Alert] : alert [General Appearance - In No Acute Distress] : in no acute distress [Sclera] : the sclera and conjunctiva were normal [PERRL With Normal Accommodation] : pupils were equal in size, round, reactive to light [Extraocular Movements] : extraocular movements were intact [Outer Ear] : the ears and nose were normal in appearance [Oropharynx] : the oropharynx was normal with no thrush [] : no respiratory distress [Auscultation Breath Sounds / Voice Sounds] : lungs were clear to auscultation bilaterally [Heart Rate And Rhythm] : heart rate was normal and rhythm regular [Heart Sounds] : normal S1 and S2 [Heart Sounds Gallop] : no gallops [Murmurs] : no murmurs [Heart Sounds Pericardial Friction Rub] : no pericardial rub

## 2022-12-15 VITALS
HEART RATE: 81 BPM | HEIGHT: 63.78 IN | BODY MASS INDEX: 25.03 KG/M2 | DIASTOLIC BLOOD PRESSURE: 85 MMHG | WEIGHT: 144.84 LBS | SYSTOLIC BLOOD PRESSURE: 124 MMHG | RESPIRATION RATE: 16 BRPM

## 2022-12-21 LAB
CULTURE RESULTS: SIGNIFICANT CHANGE UP
SPECIMEN SOURCE: SIGNIFICANT CHANGE UP

## 2022-12-28 ENCOUNTER — APPOINTMENT (OUTPATIENT)
Dept: CARDIOLOGY | Facility: CLINIC | Age: 32
End: 2022-12-28
Payer: COMMERCIAL

## 2022-12-28 ENCOUNTER — TRANSCRIPTION ENCOUNTER (OUTPATIENT)
Age: 32
End: 2022-12-28

## 2022-12-28 VITALS
HEIGHT: 63 IN | WEIGHT: 145 LBS | SYSTOLIC BLOOD PRESSURE: 106 MMHG | BODY MASS INDEX: 25.69 KG/M2 | DIASTOLIC BLOOD PRESSURE: 64 MMHG | HEART RATE: 80 BPM

## 2022-12-28 PROCEDURE — 99204 OFFICE O/P NEW MOD 45 MIN: CPT | Mod: 25

## 2022-12-28 PROCEDURE — 93000 ELECTROCARDIOGRAM COMPLETE: CPT

## 2022-12-28 PROCEDURE — 99214 OFFICE O/P EST MOD 30 MIN: CPT | Mod: 25

## 2023-01-02 NOTE — PHYSICAL EXAM
[No Acute Distress] : no acute distress [Normal S1, S2] : normal S1, S2 [No Murmur] : no murmur [Clear Lung Fields] : clear lung fields [No Respiratory Distress] : no respiratory distress  [Normal Gait] : normal gait [Normal] : moves all extremities, no focal deficits, normal speech [Alert and Oriented] : alert and oriented [de-identified] : normal healing of mid sternal surgical sternal wound, no edema, non tenderness, no sweliing.

## 2023-01-02 NOTE — HISTORY OF PRESENT ILLNESS
[FreeTextEntry1] : I had the pleasure to see Magui Verma today for cardiology evaluation. She is 32 year old female patient  with PMHx of congenital VSD, recently hospitalized initially for cough SOB and hemoptysis, diagnosed with endocarditis complicated by severe MR and heart failure. Treated with IV ABX and transferred to [facility] for surgical intervention. on 11/22/22 she underwent successful mitral valve vegetation resection and replacement with 31 mm tissue epic valve. VSD closed primarily. \par \par Patient recovered well, followed with CT Sx and ID on regular basis, just finished full course of IV ABx as recommended by ID and pic line removed. TTE repeated most recent 2 weeks ago as per patient and was unremarkable. \par Currently patient feel much better, denies chest pain, shortness of breath , TINAJERO and palpitations no syncope. \par looks euvolemic. BP controlled. \par \par Current medication:\par ASA 81 mg daily\par eliquis 5 mg bid \par pantoprazole. \par senna\par

## 2023-01-02 NOTE — REVIEW OF SYSTEMS
[SOB] : no shortness of breath [Dyspnea on exertion] : not dyspnea during exertion [Chest Discomfort] : no chest discomfort [Palpitations] : no palpitations [Orthopnea] : no orthopnea [Syncope] : no syncope [Negative] : Psychiatric

## 2023-01-04 LAB
CULTURE RESULTS: SIGNIFICANT CHANGE UP
SPECIMEN SOURCE: SIGNIFICANT CHANGE UP

## 2023-01-09 ENCOUNTER — APPOINTMENT (OUTPATIENT)
Dept: INFECTIOUS DISEASE | Facility: CLINIC | Age: 33
End: 2023-01-09
Payer: COMMERCIAL

## 2023-01-09 VITALS
HEART RATE: 93 BPM | HEIGHT: 63 IN | OXYGEN SATURATION: 98 % | TEMPERATURE: 97.5 F | WEIGHT: 148 LBS | DIASTOLIC BLOOD PRESSURE: 77 MMHG | SYSTOLIC BLOOD PRESSURE: 108 MMHG | BODY MASS INDEX: 26.22 KG/M2

## 2023-01-09 PROCEDURE — 99213 OFFICE O/P EST LOW 20 MIN: CPT

## 2023-01-09 NOTE — HISTORY OF PRESENT ILLNESS
[FreeTextEntry1] : completed  ceftriaxone ( and genta)   a few weeks ago for alpha strep endocardtis  \par doing  well\par no completion feels great \par no signs or symptoms of infection

## 2023-01-09 NOTE — ASSESSMENT
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

## 2023-01-10 ENCOUNTER — NON-APPOINTMENT (OUTPATIENT)
Age: 33
End: 2023-01-10

## 2023-01-24 LAB
BACTERIA BLD CULT: NORMAL
BACTERIA BLD CULT: NORMAL

## 2023-02-01 ENCOUNTER — NON-APPOINTMENT (OUTPATIENT)
Age: 33
End: 2023-02-01

## 2023-02-13 ENCOUNTER — APPOINTMENT (OUTPATIENT)
Dept: CARDIOLOGY | Facility: CLINIC | Age: 33
End: 2023-02-13
Payer: COMMERCIAL

## 2023-02-13 PROCEDURE — 93306 TTE W/DOPPLER COMPLETE: CPT

## 2023-02-14 ENCOUNTER — APPOINTMENT (OUTPATIENT)
Dept: OBGYN | Facility: CLINIC | Age: 33
End: 2023-02-14
Payer: COMMERCIAL

## 2023-02-14 ENCOUNTER — ASOB RESULT (OUTPATIENT)
Age: 33
End: 2023-02-14

## 2023-02-14 VITALS
HEART RATE: 80 BPM | HEIGHT: 63 IN | WEIGHT: 152 LBS | BODY MASS INDEX: 26.93 KG/M2 | DIASTOLIC BLOOD PRESSURE: 82 MMHG | SYSTOLIC BLOOD PRESSURE: 116 MMHG | TEMPERATURE: 97.5 F

## 2023-02-14 PROCEDURE — 81025 URINE PREGNANCY TEST: CPT

## 2023-02-14 PROCEDURE — 99215 OFFICE O/P EST HI 40 MIN: CPT | Mod: 25

## 2023-02-14 PROCEDURE — 81003 URINALYSIS AUTO W/O SCOPE: CPT | Mod: QW

## 2023-02-14 PROCEDURE — ZZZZZ: CPT

## 2023-02-14 PROCEDURE — 76817 TRANSVAGINAL US OBSTETRIC: CPT

## 2023-02-16 LAB
BILIRUB UR QL STRIP: NORMAL
CLARITY UR: CLEAR
COLLECTION METHOD: NORMAL
GLUCOSE UR-MCNC: NORMAL
HCG UR QL: 0.2 EU/DL
HCG UR QL: POSITIVE
HGB UR QL STRIP.AUTO: NORMAL
KETONES UR-MCNC: NORMAL
LEUKOCYTE ESTERASE UR QL STRIP: NORMAL
NITRITE UR QL STRIP: NORMAL
PH UR STRIP: 6.5
PROT UR STRIP-MCNC: NORMAL
QUALITY CONTROL: YES
SP GR UR STRIP: >=1.03

## 2023-02-18 LAB
A VAGINAE DNA VAG QL NAA+PROBE: ABNORMAL
BVAB2 DNA VAG QL NAA+PROBE: NORMAL
C KRUSEI DNA VAG QL NAA+PROBE: NEGATIVE
C TRACH RRNA SPEC QL NAA+PROBE: NEGATIVE
MEGA1 DNA VAG QL NAA+PROBE: NORMAL
N GONORRHOEA RRNA SPEC QL NAA+PROBE: NEGATIVE
T VAGINALIS RRNA SPEC QL NAA+PROBE: NEGATIVE

## 2023-03-02 ENCOUNTER — EMERGENCY (EMERGENCY)
Facility: HOSPITAL | Age: 33
LOS: 0 days | Discharge: HOME | End: 2023-03-02
Attending: EMERGENCY MEDICINE
Payer: COMMERCIAL

## 2023-03-02 ENCOUNTER — NON-APPOINTMENT (OUTPATIENT)
Age: 33
End: 2023-03-02

## 2023-03-02 VITALS
RESPIRATION RATE: 19 BRPM | DIASTOLIC BLOOD PRESSURE: 60 MMHG | TEMPERATURE: 98 F | SYSTOLIC BLOOD PRESSURE: 115 MMHG | OXYGEN SATURATION: 100 % | HEART RATE: 83 BPM

## 2023-03-02 VITALS
OXYGEN SATURATION: 99 % | HEART RATE: 90 BPM | SYSTOLIC BLOOD PRESSURE: 124 MMHG | DIASTOLIC BLOOD PRESSURE: 60 MMHG | WEIGHT: 149.91 LBS | TEMPERATURE: 98 F | RESPIRATION RATE: 18 BRPM | HEIGHT: 63 IN

## 2023-03-02 DIAGNOSIS — O26.891 OTHER SPECIFIED PREGNANCY RELATED CONDITIONS, FIRST TRIMESTER: ICD-10-CM

## 2023-03-02 DIAGNOSIS — Z79.82 LONG TERM (CURRENT) USE OF ASPIRIN: ICD-10-CM

## 2023-03-02 DIAGNOSIS — O20.9 HEMORRHAGE IN EARLY PREGNANCY, UNSPECIFIED: ICD-10-CM

## 2023-03-02 DIAGNOSIS — Z3A.01 LESS THAN 8 WEEKS GESTATION OF PREGNANCY: ICD-10-CM

## 2023-03-02 DIAGNOSIS — Z79.01 LONG TERM (CURRENT) USE OF ANTICOAGULANTS: ICD-10-CM

## 2023-03-02 DIAGNOSIS — R10.9 UNSPECIFIED ABDOMINAL PAIN: ICD-10-CM

## 2023-03-02 DIAGNOSIS — Z3A.09 9 WEEKS GESTATION OF PREGNANCY: ICD-10-CM

## 2023-03-02 DIAGNOSIS — Z86.79 PERSONAL HISTORY OF OTHER DISEASES OF THE CIRCULATORY SYSTEM: ICD-10-CM

## 2023-03-02 LAB
ALBUMIN SERPL ELPH-MCNC: 4.3 G/DL — SIGNIFICANT CHANGE UP (ref 3.5–5.2)
ALP SERPL-CCNC: 68 U/L — SIGNIFICANT CHANGE UP (ref 30–115)
ALT FLD-CCNC: 20 U/L — SIGNIFICANT CHANGE UP (ref 0–41)
ANION GAP SERPL CALC-SCNC: 13 MMOL/L — SIGNIFICANT CHANGE UP (ref 7–14)
APPEARANCE UR: ABNORMAL
AST SERPL-CCNC: 42 U/L — HIGH (ref 0–41)
BACTERIA # UR AUTO: ABNORMAL
BASOPHILS # BLD AUTO: 0.04 K/UL — SIGNIFICANT CHANGE UP (ref 0–0.2)
BASOPHILS NFR BLD AUTO: 0.3 % — SIGNIFICANT CHANGE UP (ref 0–1)
BILIRUB SERPL-MCNC: 0.5 MG/DL — SIGNIFICANT CHANGE UP (ref 0.2–1.2)
BILIRUB UR-MCNC: NEGATIVE — SIGNIFICANT CHANGE UP
BLD GP AB SCN SERPL QL: SIGNIFICANT CHANGE UP
BUN SERPL-MCNC: 7 MG/DL — LOW (ref 10–20)
CALCIUM SERPL-MCNC: 9.2 MG/DL — SIGNIFICANT CHANGE UP (ref 8.4–10.5)
CHLORIDE SERPL-SCNC: 103 MMOL/L — SIGNIFICANT CHANGE UP (ref 98–110)
CO2 SERPL-SCNC: 20 MMOL/L — SIGNIFICANT CHANGE UP (ref 17–32)
COLOR SPEC: YELLOW — SIGNIFICANT CHANGE UP
CREAT SERPL-MCNC: 0.7 MG/DL — SIGNIFICANT CHANGE UP (ref 0.7–1.5)
DIFF PNL FLD: ABNORMAL
EGFR: 117 ML/MIN/1.73M2 — SIGNIFICANT CHANGE UP
EOSINOPHIL # BLD AUTO: 0.11 K/UL — SIGNIFICANT CHANGE UP (ref 0–0.7)
EOSINOPHIL NFR BLD AUTO: 0.9 % — SIGNIFICANT CHANGE UP (ref 0–8)
EPI CELLS # UR: 20 /HPF — HIGH (ref 0–5)
GLUCOSE SERPL-MCNC: 96 MG/DL — SIGNIFICANT CHANGE UP (ref 70–99)
GLUCOSE UR QL: NEGATIVE — SIGNIFICANT CHANGE UP
HCG SERPL-ACNC: HIGH MIU/ML
HCT VFR BLD CALC: 37.1 % — SIGNIFICANT CHANGE UP (ref 37–47)
HGB BLD-MCNC: 12.1 G/DL — SIGNIFICANT CHANGE UP (ref 12–16)
HYALINE CASTS # UR AUTO: 2 /LPF — SIGNIFICANT CHANGE UP (ref 0–7)
IMM GRANULOCYTES NFR BLD AUTO: 0.4 % — HIGH (ref 0.1–0.3)
KETONES UR-MCNC: NEGATIVE — SIGNIFICANT CHANGE UP
LEUKOCYTE ESTERASE UR-ACNC: NEGATIVE — SIGNIFICANT CHANGE UP
LYMPHOCYTES # BLD AUTO: 17.6 % — LOW (ref 20.5–51.1)
LYMPHOCYTES # BLD AUTO: 2.15 K/UL — SIGNIFICANT CHANGE UP (ref 1.2–3.4)
MCHC RBC-ENTMCNC: 26.8 PG — LOW (ref 27–31)
MCHC RBC-ENTMCNC: 32.6 G/DL — SIGNIFICANT CHANGE UP (ref 32–37)
MCV RBC AUTO: 82.1 FL — SIGNIFICANT CHANGE UP (ref 81–99)
MONOCYTES # BLD AUTO: 0.73 K/UL — HIGH (ref 0.1–0.6)
MONOCYTES NFR BLD AUTO: 6 % — SIGNIFICANT CHANGE UP (ref 1.7–9.3)
NEUTROPHILS # BLD AUTO: 9.14 K/UL — HIGH (ref 1.4–6.5)
NEUTROPHILS NFR BLD AUTO: 74.8 % — SIGNIFICANT CHANGE UP (ref 42.2–75.2)
NITRITE UR-MCNC: NEGATIVE — SIGNIFICANT CHANGE UP
NRBC # BLD: 0 /100 WBCS — SIGNIFICANT CHANGE UP (ref 0–0)
PH UR: 6.5 — SIGNIFICANT CHANGE UP (ref 5–8)
PLATELET # BLD AUTO: 304 K/UL — SIGNIFICANT CHANGE UP (ref 130–400)
POTASSIUM SERPL-MCNC: 5.4 MMOL/L — HIGH (ref 3.5–5)
POTASSIUM SERPL-SCNC: 5.4 MMOL/L — HIGH (ref 3.5–5)
PROT SERPL-MCNC: 7.3 G/DL — SIGNIFICANT CHANGE UP (ref 6–8)
PROT UR-MCNC: ABNORMAL
RBC # BLD: 4.52 M/UL — SIGNIFICANT CHANGE UP (ref 4.2–5.4)
RBC # FLD: 16.2 % — HIGH (ref 11.5–14.5)
RBC CASTS # UR COMP ASSIST: 6 /HPF — HIGH (ref 0–4)
SODIUM SERPL-SCNC: 136 MMOL/L — SIGNIFICANT CHANGE UP (ref 135–146)
SP GR SPEC: 1.02 — SIGNIFICANT CHANGE UP (ref 1.01–1.03)
UROBILINOGEN FLD QL: SIGNIFICANT CHANGE UP
WBC # BLD: 12.22 K/UL — HIGH (ref 4.8–10.8)
WBC # FLD AUTO: 12.22 K/UL — HIGH (ref 4.8–10.8)
WBC UR QL: 5 /HPF — SIGNIFICANT CHANGE UP (ref 0–5)

## 2023-03-02 PROCEDURE — 85025 COMPLETE CBC W/AUTO DIFF WBC: CPT

## 2023-03-02 PROCEDURE — 99284 EMERGENCY DEPT VISIT MOD MDM: CPT

## 2023-03-02 PROCEDURE — 81001 URINALYSIS AUTO W/SCOPE: CPT

## 2023-03-02 PROCEDURE — 36415 COLL VENOUS BLD VENIPUNCTURE: CPT

## 2023-03-02 PROCEDURE — 76856 US EXAM PELVIC COMPLETE: CPT | Mod: 26

## 2023-03-02 PROCEDURE — 86850 RBC ANTIBODY SCREEN: CPT

## 2023-03-02 PROCEDURE — 87086 URINE CULTURE/COLONY COUNT: CPT

## 2023-03-02 PROCEDURE — 86900 BLOOD TYPING SEROLOGIC ABO: CPT

## 2023-03-02 PROCEDURE — 76856 US EXAM PELVIC COMPLETE: CPT

## 2023-03-02 PROCEDURE — 99284 EMERGENCY DEPT VISIT MOD MDM: CPT | Mod: 25

## 2023-03-02 PROCEDURE — 80053 COMPREHEN METABOLIC PANEL: CPT

## 2023-03-02 PROCEDURE — 86901 BLOOD TYPING SEROLOGIC RH(D): CPT

## 2023-03-02 PROCEDURE — 84702 CHORIONIC GONADOTROPIN TEST: CPT

## 2023-03-02 NOTE — ED PROVIDER NOTE - IV ALTEPLASE EXCL ABS HIDDEN
Detail Level: Detailed Body Location Override (Optional - Billing Will Still Be Based On Selected Body Map Location If Applicable): left alar crease show Add 21278 Cpt? (Important Note: In 2017 The Use Of 66507 Is Being Tracked By Cms To Determine Future Global Period Reimbursement For Global Periods): yes

## 2023-03-02 NOTE — ED PROVIDER NOTE - NSFOLLOWUPCLINICS_GEN_ALL_ED_FT
[facility]  OB/GYN  [address] [place] [address]  Phone: [phone]  Fax:   Follow Up Time: 1-3 Days

## 2023-03-02 NOTE — ED PROVIDER NOTE - ATTENDING CONTRIBUTION TO CARE
32-year-old female G2, P0 approximate 9 weeks pregnant by LMP presents for evaluation of vaginal bleeding since yesterday feels with dull crampy abdominal pain.  Patient ports the vaginal bleeding is light noticed that she had clots in urine.  She has no fever chills.  On exam patient no distress S1-S2 clear to auscultation bilaterally soft nontender abdomen neurologic grossly intact.  impression  Patient here  with vaginal bleeding in first trimester of pregnancy.  Here patient found to be Rh+ and sonogram confirming IUP.  Note there is a large left ovary however the patient has no significant pain in his blood with that ovary as well. advised pelvic rest and ob f/u

## 2023-03-02 NOTE — ED PROVIDER NOTE - PROGRESS NOTE DETAILS
While the ultrasound demonstrates asymmetric enlargement of left ovary the patient is well-appearing no abdominal pain on exam. unlikely torsion. Pt is stable for discharge with outpatient follow-up.  Repeat urine dip demonstrates no signs of infection Bayron: Patient informed of all findings including of ultrasound finding of enlargement of left ovary.  She states she is aware of this as she has had MRI done of her ovaries in the past.  She was aware of having a left ovarian cyst.  Patient is comfortable with discharge with follow-up with her OB/GYN outpatient.  Patient to be discharged from ED. Any available test results were discussed with patient and/or family. Verbal instructions given, including instructions to return to ED immediately for any new, worsening, or concerning symptoms. Patient endorsed understanding. Written discharge instructions additionally given, including follow-up plan.

## 2023-03-02 NOTE — ED PROVIDER NOTE - NSFOLLOWUPINSTRUCTIONS_ED_ALL_ED_FT
Please follow-up with your OB/GYN.  A complete copy of results is attached.    Vaginal Bleeding During Pregnancy, First Trimester      A small amount of bleeding (spotting) from the vagina is common during early pregnancy. Sometimes the bleeding is normal and does not cause problems. At other times, though, bleeding may be a sign of something serious. Tell your doctor about any bleeding from your vagina right away.      Follow these instructions at home:    Activity     Follow your doctor's instructions about how active you can be.  If needed, make plans for someone to help with your normal activities.  Do not have sex or orgasms until your doctor says that this is safe.      General instructions     Take over-the-counter and prescription medicines only as told by your doctor.  Watch your condition for any changes.    Write down:    The number of pads you use each day.  How often you change pads.  How soaked (saturated) your pads are.  Do not use tampons.  Do not douche.  If you pass any tissue from your vagina, save it to show to your doctor.  Keep all follow-up visits as told by your doctor. This is important.    Contact a doctor if:    You have vaginal bleeding at any time while you are pregnant.  You have cramps.  You have a fever.    Get help right away if:    You have very bad cramps in your back or belly (abdomen).  You pass large clots or a lot of tissue from your vagina.  Your bleeding gets worse.  You feel light-headed.  You feel weak.  You pass out (faint).  You have chills.  You are leaking fluid from your vagina.  You have a gush of fluid from your vagina.      Summary    Sometimes vaginal bleeding during pregnancy is normal and does not cause problems. At other times, bleeding may be a sign of something serious.  Tell your doctor about any bleeding from your vagina right away.  Follow your doctor's instructions about how active you can be. You may need someone to help you with your normal activities.  This information is not intended to replace advice given to you by your health care provider. Make sure you discuss any questions you have with your health care provider.

## 2023-03-02 NOTE — ED PROVIDER NOTE - CLINICAL SUMMARY MEDICAL DECISION MAKING FREE TEXT BOX
04-Jul-2022
Patient here  with vaginal bleeding in first trimester of pregnancy.  Here patient found to be Rh+ and sonogram confirming IUP.  Note there is a large left ovary however the patient has no significant pain in his blood with that ovary as well. advised pelvic rest and ob f/u

## 2023-03-02 NOTE — ED PROVIDER NOTE - PHYSICAL EXAMINATION
VITAL SIGNS: I have reviewed nursing notes and confirm.  CONSTITUTIONAL: well-appearing, non-toxic, NAD  SKIN: Warm dry, normal skin turgor  HEAD: NCAT  EYES: EOMI, PERRL, no scleral icterus  ENT: Moist mucous membranes, normal pharynx with no erythema or exudates  NECK: Supple; non tender. Full ROM. No cervical LAD  CARD: RRR, no murmurs, rubs or gallops  RESP: clear to ausculation b/l.  No rales, rhonchi, or wheezing.  ABD: soft, non-tender, non-distended, no rebound or guarding. No CVA tenderness  EXT: Full ROM, no bony tenderness, no pedal edema, no calf tenderness  NEURO: normal motor. normal sensory. Normal gait.  PSYCH: Cooperative, appropriate.

## 2023-03-02 NOTE — ED PROVIDER NOTE - NS ED ROS FT
We would like to see you back in our office for follow-up in 3 months. Please call or return sooner if you are having any breast related concerns/symptoms.     Call if you notice any new lumps, masses, breast pain, nipple discharge, nipple inversion, or breast/nipple skin changes.        Evidence suggests that active lifestyle, healthy diet, limited alcohol intake, and achieving and maintaining an ideal body weight (20-25 BMI) may lead to optimal breast cancer risk reduction outcomes.     Constitutional: (-) diaphoresis  Eyes/ENT: (-) runny nose  Cardiovascular: (-) chest pain  Respiratory: (-) cough  Gastrointestinal: (-) vomiting, (-) diarrhea  : see HPI  Musculoskeletal: (-) joint pain  Integumentary: (-) rash  Neurological: (-) LOC  Allergic/Immunologic: (-) pruritus

## 2023-03-02 NOTE — ED PROVIDER NOTE - OBJECTIVE STATEMENT
32F PMHx recent mitral valve repair in 11/2022 32F  (hx one elective  10 years ago) at estimated 9wga PMHx recent mitral valve repair in 2022 presents to ED for vaginal bleeding that started yesterday. Patient reports since yesterday she has had intermittent dull abdominal cramping associated with light vaginal bleeding.  This morning she noted clots in her urine every time she urinated.  Denies fever, chills, nausea, vomiting, chest pain, shortness of breath. OBGYN Dr. Olivares.

## 2023-03-02 NOTE — ED PROVIDER NOTE - PATIENT PORTAL LINK FT
You can access the FollowMyHealth Patient Portal offered by [facility] by registering at the following website: [email or web address]. By joining [employer]’s FollowMyHealth portal, you will also be able to view your health information using other applications (apps) compatible with our system.

## 2023-03-03 LAB
CULTURE RESULTS: SIGNIFICANT CHANGE UP
SPECIMEN SOURCE: SIGNIFICANT CHANGE UP

## 2023-03-06 ENCOUNTER — APPOINTMENT (OUTPATIENT)
Dept: OBGYN | Facility: CLINIC | Age: 33
End: 2023-03-06

## 2023-03-08 ENCOUNTER — LABORATORY RESULT (OUTPATIENT)
Age: 33
End: 2023-03-08

## 2023-03-09 ENCOUNTER — ASOB RESULT (OUTPATIENT)
Age: 33
End: 2023-03-09

## 2023-03-09 ENCOUNTER — APPOINTMENT (OUTPATIENT)
Dept: OBGYN | Facility: CLINIC | Age: 33
End: 2023-03-09
Payer: COMMERCIAL

## 2023-03-09 VITALS
HEART RATE: 86 BPM | SYSTOLIC BLOOD PRESSURE: 96 MMHG | BODY MASS INDEX: 26.58 KG/M2 | TEMPERATURE: 98.6 F | HEIGHT: 63 IN | WEIGHT: 150 LBS | DIASTOLIC BLOOD PRESSURE: 69 MMHG

## 2023-03-09 DIAGNOSIS — Z31.5 ENCOUNTER FOR PROCREATIVE GENETIC COUNSELING: ICD-10-CM

## 2023-03-09 LAB
ABO + RH PNL BLD: NORMAL
ALBUMIN SERPL ELPH-MCNC: 4.9 G/DL
ALP BLD-CCNC: 87 U/L
ALT SERPL-CCNC: 19 U/L
ANION GAP SERPL CALC-SCNC: 17 MMOL/L
AST SERPL-CCNC: 22 U/L
BASOPHILS # BLD AUTO: 0.04 K/UL
BASOPHILS NFR BLD AUTO: 0.4 %
BILIRUB SERPL-MCNC: 0.3 MG/DL
BILIRUB UR QL STRIP: NORMAL
BLD GP AB SCN SERPL QL: NORMAL
BUN SERPL-MCNC: 11 MG/DL
CALCIUM SERPL-MCNC: 10.3 MG/DL
CHLORIDE SERPL-SCNC: 102 MMOL/L
CLARITY UR: NORMAL
CO2 SERPL-SCNC: 20 MMOL/L
COLLECTION METHOD: NORMAL
CREAT SERPL-MCNC: 0.6 MG/DL
EGFR: 121 ML/MIN/1.73M2
EOSINOPHIL # BLD AUTO: 0.06 K/UL
EOSINOPHIL NFR BLD AUTO: 0.5 %
ESTIMATED AVERAGE GLUCOSE: 103 MG/DL
GLUCOSE SERPL-MCNC: 89 MG/DL
GLUCOSE UR-MCNC: NORMAL
HBA1C MFR BLD HPLC: 5.2 %
HCG SERPL-MCNC: ABNORMAL MIU/ML
HCG UR QL: 0.2 EU/DL
HCT VFR BLD CALC: 42.3 %
HGB BLD-MCNC: 13.6 G/DL
HGB UR QL STRIP.AUTO: ABNORMAL
IMM GRANULOCYTES NFR BLD AUTO: 0.4 %
KETONES UR-MCNC: NORMAL
LEUKOCYTE ESTERASE UR QL STRIP: NORMAL
LYMPHOCYTES # BLD AUTO: 1.88 K/UL
LYMPHOCYTES NFR BLD AUTO: 16.6 %
MAN DIFF?: NORMAL
MCHC RBC-ENTMCNC: 27.2 PG
MCHC RBC-ENTMCNC: 32.2 G/DL
MCV RBC AUTO: 84.6 FL
MONOCYTES # BLD AUTO: 0.56 K/UL
MONOCYTES NFR BLD AUTO: 5 %
NEUTROPHILS # BLD AUTO: 8.73 K/UL
NEUTROPHILS NFR BLD AUTO: 77.1 %
NITRITE UR QL STRIP: NORMAL
PH UR STRIP: 5.5
PLATELET # BLD AUTO: 342 K/UL
POTASSIUM SERPL-SCNC: 4.3 MMOL/L
PROT SERPL-MCNC: 8 G/DL
PROT UR STRIP-MCNC: ABNORMAL
RBC # BLD: 5 M/UL
RBC # FLD: 17.3 %
SODIUM SERPL-SCNC: 139 MMOL/L
SP GR UR STRIP: 1.03
TSH SERPL-ACNC: 0.89 UIU/ML
WBC # FLD AUTO: 11.31 K/UL

## 2023-03-09 PROCEDURE — ZZZZZ: CPT

## 2023-03-09 PROCEDURE — 81003 URINALYSIS AUTO W/O SCOPE: CPT | Mod: QW

## 2023-03-09 PROCEDURE — 0500F INITIAL PRENATAL CARE VISIT: CPT | Mod: 25

## 2023-03-09 PROCEDURE — G0452: CPT | Mod: 26

## 2023-03-09 PROCEDURE — 76817 TRANSVAGINAL US OBSTETRIC: CPT

## 2023-03-12 LAB
25(OH)D3 SERPL-MCNC: 33 NG/ML
BACTERIA UR CULT: NORMAL
CMV IGG SERPL QL: 2.1 U/ML
CMV IGG SERPL-IMP: POSITIVE
CMV IGM SERPL QL: <8 AU/ML
CMV IGM SERPL QL: NEGATIVE
HBV SURFACE AG SER QL: NONREACTIVE
HCV AB SER QL: NONREACTIVE
HCV S/CO RATIO: 0.06 S/CO
HGB A MFR BLD: 97.4 %
HGB A2 MFR BLD: 2.6 %
HGB FRACT BLD-IMP: NORMAL
HIV1+2 AB SPEC QL IA.RAPID: NONREACTIVE
HSV 1+2 IGG SER IA-IMP: NEGATIVE
HSV 1+2 IGG SER IA-IMP: POSITIVE
HSV1 IGG SER QL: >62.2 INDEX
HSV2 IGG SER QL: 0.56 INDEX
LEAD BLD-MCNC: <1 UG/DL
MEV IGG FLD QL IA: 98.7 AU/ML
MEV IGG+IGM SER-IMP: POSITIVE
MUV AB SER-ACNC: POSITIVE
MUV IGG SER QL IA: >300 AU/ML
PROGEST SERPL-MCNC: 21.1 NG/ML
RUBV IGG FLD-ACNC: 2.6 INDEX
RUBV IGG SER-IMP: POSITIVE
T GONDII AB SER-IMP: NEGATIVE
T GONDII AB SER-IMP: NEGATIVE
T GONDII IGG SER QL: <3 IU/ML
T GONDII IGM SER QL: <3 AU/ML
T PALLIDUM AB SER QL IA: NEGATIVE
VZV AB TITR SER: POSITIVE
VZV IGG SER IF-ACNC: 217.2 INDEX

## 2023-03-14 LAB
B19V IGG SER QL IA: 2.4 INDEX
B19V IGG+IGM SER-IMP: NORMAL
B19V IGG+IGM SER-IMP: POSITIVE
B19V IGM FLD-ACNC: 0.15 INDEX
B19V IGM SER-ACNC: NEGATIVE
M TB IFN-G BLD-IMP: NEGATIVE
QUANTIFERON TB PLUS MITOGEN MINUS NIL: 1.42 IU/ML
QUANTIFERON TB PLUS NIL: 0.04 IU/ML
QUANTIFERON TB PLUS TB1 MINUS NIL: -0.01 IU/ML
QUANTIFERON TB PLUS TB2 MINUS NIL: -0.01 IU/ML

## 2023-03-15 ENCOUNTER — APPOINTMENT (OUTPATIENT)
Dept: CARDIOLOGY | Facility: CLINIC | Age: 33
End: 2023-03-15
Payer: COMMERCIAL

## 2023-03-15 ENCOUNTER — RESULT CHARGE (OUTPATIENT)
Age: 33
End: 2023-03-15

## 2023-03-15 VITALS
DIASTOLIC BLOOD PRESSURE: 62 MMHG | SYSTOLIC BLOOD PRESSURE: 100 MMHG | BODY MASS INDEX: 26.58 KG/M2 | HEART RATE: 86 BPM | HEIGHT: 63 IN | WEIGHT: 150 LBS

## 2023-03-15 PROCEDURE — 93000 ELECTROCARDIOGRAM COMPLETE: CPT

## 2023-03-15 PROCEDURE — 99213 OFFICE O/P EST LOW 20 MIN: CPT | Mod: 25

## 2023-03-15 NOTE — PHYSICAL EXAM
[No Acute Distress] : no acute distress [Normal S1, S2] : normal S1, S2 [No Murmur] : no murmur [Clear Lung Fields] : clear lung fields [No Respiratory Distress] : no respiratory distress  [Normal Gait] : normal gait [Normal] : moves all extremities, no focal deficits, normal speech [Alert and Oriented] : alert and oriented [de-identified] : normal healing of mid sternal surgical sternal wound, no edema, non tenderness, no sweliing.

## 2023-03-15 NOTE — ASSESSMENT
[FreeTextEntry1] : 1) 11/22/22 s/p successful mitral valve vegetation resection and replacement with 31 mm tissue epic valve. VSD closed primarily.. \par Currently stable, repeated echo 2/2023 normal EF normal functioning prosthetic mitral valve \par continue asa \par \par 2)Pregnancy \par eliquis stopped \par following GYN \par \par 3)First degree av block \par asymptomatic , hemodynamically stable\par will repeat EKG in few weeks\par follow up in 3 months\par \par Patient advised to call back or seed medical advise if new symptoms developed. \par

## 2023-03-15 NOTE — REVIEW OF SYSTEMS
[Negative] : Psychiatric [SOB] : no shortness of breath [Dyspnea on exertion] : not dyspnea during exertion [Chest Discomfort] : no chest discomfort [Palpitations] : no palpitations [Orthopnea] : no orthopnea [Syncope] : no syncope

## 2023-03-20 LAB
AR GENE MUT ANL BLD/T: NORMAL
CFTR MUT TESTED BLD/T: NEGATIVE
FMR1 GENE MUT ANL BLD/T: NORMAL
HSV1 IGM SER QL: NEGATIVE
HSV2 AB FLD-ACNC: NEGATIVE

## 2023-03-24 ENCOUNTER — APPOINTMENT (OUTPATIENT)
Dept: ANTEPARTUM | Facility: CLINIC | Age: 33
End: 2023-03-24
Payer: COMMERCIAL

## 2023-03-24 ENCOUNTER — ASOB RESULT (OUTPATIENT)
Age: 33
End: 2023-03-24

## 2023-03-24 ENCOUNTER — NON-APPOINTMENT (OUTPATIENT)
Age: 33
End: 2023-03-24

## 2023-03-24 VITALS
BODY MASS INDEX: 27.11 KG/M2 | SYSTOLIC BLOOD PRESSURE: 100 MMHG | HEIGHT: 63 IN | HEART RATE: 87 BPM | DIASTOLIC BLOOD PRESSURE: 67 MMHG | WEIGHT: 153 LBS

## 2023-03-24 PROCEDURE — 76813 OB US NUCHAL MEAS 1 GEST: CPT

## 2023-03-28 ENCOUNTER — APPOINTMENT (OUTPATIENT)
Dept: MATERNAL FETAL MEDICINE | Facility: CLINIC | Age: 33
End: 2023-03-28
Payer: COMMERCIAL

## 2023-03-28 VITALS
DIASTOLIC BLOOD PRESSURE: 70 MMHG | WEIGHT: 154 LBS | HEIGHT: 63 IN | HEART RATE: 86 BPM | BODY MASS INDEX: 27.29 KG/M2 | SYSTOLIC BLOOD PRESSURE: 108 MMHG

## 2023-03-28 PROCEDURE — 99215 OFFICE O/P EST HI 40 MIN: CPT

## 2023-03-28 NOTE — DISCUSSION/SUMMARY
[FreeTextEntry1] : At the request of Dr. Singh, the patient was seen for consultation for: mitral prosthetic valve and currently pregnant\par She is 33 y/o G 2 P 0010 at 13 +1/7 weeks by given JESSIE of 10 / 2 / 2023 consistent with her LMP of 2022.\par The patient reports having a known VSD since childhood. In October of last year she developed shortness of breath and non productive cough for 2 months that progressively worsened until one night on 2022, she developed severe SOB, and had hematemesis and hemoptysis. She went to the ED at [facility] and was diagnosed with severe mitral regurgitation likely acute due to flail anterior leaflet, as well as severe tricuspid regurgitation. No PE. VSD had a let to right shunting. She received Lasix and blood cultures revealed anaerobic Gram positive cocci, and she was started on penicillin and Vancomycin. Repeat TAVO found vegetations consistent with infective endocarditis (was due to S. Gemelli). She was switched to Ceftriaxone and Gentamicin. She was transferred to [facility] where she had on 22: mitral valve vegetations resected and replaced with 31 mm tissue epic valve. VSD closed primarily. She stayed there for one week then got discharged. She remained on IV antibiotics though a PICC line until 2022. She was also started on Eliquis with plan for 3 months, and she stopped it as soon as she found out she was pregnant which was one week short of 3 months but is on LDASA still. She reports that the pregnancy was unplanned but very wanted.  She had one episode of threatened  on 2023.\par Patient denies any SOB or palpitations or chest pain, states she only feels SOB if she runs or takes a flight of stairs and then she would take a break to catch her breath. \par \par Pmhx: as above. Denies hypertension or diabetes or other medical problems. \par Pshx: thoracotomy and mitral valve replacement with closure of VSD\par Meds: PNV, LDASA.\par Allergies: NKDA\par Sochx: denies smoking, ETOH or drug use. Lives with her boyfriend in a house and has 2 cats and one dog.  Works in marketing. \par Obhx: 1 TOP at 12 weeks years back with D&C.\par Gynhx: had a history of menorrhagia and diagnosed with adenomyosis. Had a calcification in the right ovary. \par Psychiatric history: denies depression or anxiety.\par Genetic history: denies consanguinity, or family members with malformations or mental disabilities.\par Family history: has a few cousins with heart murmurs but not sure if due to VSD.\par \par Physical exam:\par VS: /70, P 86, Wt: 154 lbs. \par Neck supple, no thyromegaly\par Chest: S1 S2 with click is heard and a systolic 2/6 murmur is heard. CTAB. \par No CVA tenderness.\par Abdomen is gravid, soft, and non tender.\par No calves pain or lower limb edema.\par \par Prenatal labs reviewed in [employer]. TSH is normal. \par Noted EKG from 23 shows a first degree block.\par Last echo from 2023; EF of 66% and normally functioning mitral valve.  \par Last blood culture from 2023 was negative. \par \par Assessment:\par -   32 y/p  at 13 weeks GA with history of infective endocarditis, mitral leaflet rupture and acute MR, treated with IV antibiotics and MR valve replacement (MVR). First degree heart block. Doing well. \par Counseling: \par I reviewed with the patient at length the effect of her MVR on pregnancy as well as the effect of pregnancy on her cardiac condition. I explained to her that data is limited to cohorts and that it depends on the cause, severity, complications, recovery, and treatment of each condition. I reviewed with her recent data from  on outcome of pregnancies with bioprosthetic valves. Overall based on this cohort, the risk of maternal  cardiac event is about 5% (arrhythmia, CHF, stroke, cardiac arrest, death) and the risk of fetal adverse event is about 16% ( delivery, SGA< RDS, ICH, death) (Yazan et al JAC  / left sided valve without dysfunction). Patient understands that every case is different and that we will be following her closely during the pregnancy. I also reiterated to her the need to express herself if she has symptoms suggestive of decompensation or complications such as palpitations, or SOB, CP, or neurologic symptoms. She verbalized understanding. \par \par Recommend:\par - Anatomic survey at 18-20 weeks, and fetal echo at around 20-22 weeks. \par - Referrals: follow up with cardiology as scheduled: ECG on , and appointment on 06/15.\par - Medications: continue LDASA, will need infective endocarditis prophylaxis 30-60 minutes before birth.\par - Repeat cardiac echo in the third trimester around 28-30 weeks and as needed. \par - Genetic counseling recommended. \par - Serial fetal growth assessment.\par - Timing of delivery to be determined based on the clinical condition of the patient and fetal surveillance. Mode of delivery was discussed with the patient and Dr. Singh and they made a shared decision for a  section. \par - Follow-up on 05/15 for anatomic survey and follow-up consultation is recommended. That way she would be seen monthly by a physician. \par - I discussed her case with Dr. Singh who is her cardiologist and we are on the same page. \par - A multidisciplinary meeting in the third trimester to plan delivery (gestational age, setting, pre- and post- monitoring) is recommended.

## 2023-03-28 NOTE — ASSESSMENT
[FreeTextEntry1] : 32 yr old female with h/o restrictive VSD, endocarditis, now s/p resection of MV vegetations, placement of a 33mm bioprosthetic MV, and VSD primary closure. Doing well at home.  Denies any SOB, chest pain, palpitations, dizziness or syncope, exercise intolerance, or fever. No immediate concerns today. \par Here for a wound check. On exam incision is healing appropriately, no signs of infection. Patient active, ambulating without difficulty. Will f/u with ID on 12/12/22 and cardiology on 12/28/22\par Wound care reinforced\par No further office visit for wound surveillance\par \par \par

## 2023-03-28 NOTE — REASON FOR VISIT
[Patient] : patient [de-identified] : Ventriculoseptal defect closure.  Mitral valve replacement with a 31 mm Epic bioprosthesis .Closure of patentforamen ovale.  Tricuspid valvuloplasty [de-identified] : 11/22/22 [de-identified] : 17 [de-identified] : 32 yr old with h/o VSD, endocarditis Admitted to [facility] for c/o chronic cough, SOB, hematemesis, echo showed severe MV regurgitation and VSD, patient was transferred to [facility] on 11/14 for further management. Was treated with diuretics and abx (ceftriaxone/gent) vanc x 1. On 11/22/22 underwent resection of MV vegetations, placement of a 33mm bioprosthetic MV, and VSD primary closure. \par \par Presents today for post op appointment

## 2023-04-05 ENCOUNTER — APPOINTMENT (OUTPATIENT)
Dept: CARDIOLOGY | Facility: CLINIC | Age: 33
End: 2023-04-05

## 2023-04-06 NOTE — PATIENT PROFILE ADULT - BILL PAYMENT
Cimetidine Pregnancy And Lactation Text: This medication is Pregnancy Category B and is considered safe during pregnancy. It is also excreted in breast milk and breast feeding isn't recommended. no

## 2023-04-10 ENCOUNTER — APPOINTMENT (OUTPATIENT)
Dept: OBGYN | Facility: CLINIC | Age: 33
End: 2023-04-10
Payer: COMMERCIAL

## 2023-04-10 VITALS
DIASTOLIC BLOOD PRESSURE: 61 MMHG | SYSTOLIC BLOOD PRESSURE: 94 MMHG | HEART RATE: 83 BPM | BODY MASS INDEX: 26.93 KG/M2 | TEMPERATURE: 98.6 F | HEIGHT: 63 IN | WEIGHT: 152 LBS

## 2023-04-10 LAB
BILIRUB UR QL STRIP: NORMAL
CLARITY UR: CLEAR
COLLECTION METHOD: NORMAL
GLUCOSE UR-MCNC: NORMAL
HCG UR QL: 0.2 EU/DL
HGB UR QL STRIP.AUTO: NORMAL
KETONES UR-MCNC: NORMAL
LEUKOCYTE ESTERASE UR QL STRIP: ABNORMAL
NITRITE UR QL STRIP: NORMAL
PH UR STRIP: 5.5
PROT UR STRIP-MCNC: NORMAL
SP GR UR STRIP: 1.01

## 2023-04-10 PROCEDURE — 81003 URINALYSIS AUTO W/O SCOPE: CPT | Mod: QW

## 2023-04-10 PROCEDURE — 99213 OFFICE O/P EST LOW 20 MIN: CPT

## 2023-04-12 ENCOUNTER — APPOINTMENT (OUTPATIENT)
Dept: CARDIOLOGY | Facility: CLINIC | Age: 33
End: 2023-04-12
Payer: COMMERCIAL

## 2023-04-12 PROCEDURE — 93000 ELECTROCARDIOGRAM COMPLETE: CPT

## 2023-04-25 ENCOUNTER — APPOINTMENT (OUTPATIENT)
Dept: OBGYN | Facility: CLINIC | Age: 33
End: 2023-04-25
Payer: COMMERCIAL

## 2023-04-25 VITALS
DIASTOLIC BLOOD PRESSURE: 70 MMHG | BODY MASS INDEX: 26.93 KG/M2 | SYSTOLIC BLOOD PRESSURE: 101 MMHG | WEIGHT: 152 LBS | HEART RATE: 83 BPM | TEMPERATURE: 98.6 F | HEIGHT: 63 IN

## 2023-04-25 DIAGNOSIS — R35.0 FREQUENCY OF MICTURITION: ICD-10-CM

## 2023-04-25 LAB
BILIRUB UR QL STRIP: NORMAL
CLARITY UR: CLEAR
COLLECTION METHOD: NORMAL
GLUCOSE UR-MCNC: NORMAL
HCG UR QL: 0.2 EU/DL
HGB UR QL STRIP.AUTO: NORMAL
KETONES UR-MCNC: NORMAL
LEUKOCYTE ESTERASE UR QL STRIP: NORMAL
NITRITE UR QL STRIP: NORMAL
PH UR STRIP: 7
PROT UR STRIP-MCNC: ABNORMAL
SP GR UR STRIP: 1.02

## 2023-04-25 PROCEDURE — 0502F SUBSEQUENT PRENATAL CARE: CPT

## 2023-04-25 PROCEDURE — 81003 URINALYSIS AUTO W/O SCOPE: CPT | Mod: QW

## 2023-04-25 NOTE — OB SUMMARY
[Date: _____] : Date: [unfilled] [Gestational Age: _____] : Gestational Age: [unfilled] [FreeTextEntry1] : Pt is here for her 17 week OB check-up, pt has no complaints today.\par \par wt- 152 ht- 5 '3 b/p- 101/70 pulse- 83 temp- 98.6 pro- trace glu- neg  [de-identified] : sm

## 2023-04-28 LAB — BACTERIA UR CULT: ABNORMAL

## 2023-04-29 LAB
AFP MOM: 1.36
AFP VALUE: 53.7 NG/ML
ALPHA FETOPROTEIN SERUM COMMENT: NORMAL
ALPHA FETOPROTEIN SERUM INTERPRETATION: NORMAL
ALPHA FETOPROTEIN SERUM RESULTS: NORMAL
ALPHA FETOPROTEIN SERUM TEST RESULTS: NORMAL
GESTATIONAL AGE BASED ON: NORMAL
GESTATIONAL AGE ON COLLECTION DATE: 17.1 WEEKS
INSULIN DEP DIABETES: NO
MATERNAL AGE AT EDD AFP: 34.3 YR
MULTIPLE GESTATION: NO
OSBR RISK 1 IN: 4034
RACE: NORMAL
WEIGHT AFP: 152 LBS

## 2023-05-03 ENCOUNTER — NON-APPOINTMENT (OUTPATIENT)
Age: 33
End: 2023-05-03

## 2023-05-03 ENCOUNTER — EMERGENCY (EMERGENCY)
Facility: HOSPITAL | Age: 33
LOS: 0 days | Discharge: HOME | End: 2023-05-03
Attending: EMERGENCY MEDICINE
Payer: COMMERCIAL

## 2023-05-03 VITALS
TEMPERATURE: 98 F | HEART RATE: 91 BPM | SYSTOLIC BLOOD PRESSURE: 122 MMHG | HEIGHT: 63 IN | WEIGHT: 156.09 LBS | DIASTOLIC BLOOD PRESSURE: 75 MMHG | RESPIRATION RATE: 20 BRPM | OXYGEN SATURATION: 98 %

## 2023-05-03 DIAGNOSIS — Z95.4 PRESENCE OF OTHER HEART-VALVE REPLACEMENT: ICD-10-CM

## 2023-05-03 DIAGNOSIS — R07.89 OTHER CHEST PAIN: ICD-10-CM

## 2023-05-03 DIAGNOSIS — Z79.01 LONG TERM (CURRENT) USE OF ANTICOAGULANTS: ICD-10-CM

## 2023-05-03 DIAGNOSIS — O99.891 OTHER SPECIFIED DISEASES AND CONDITIONS COMPLICATING PREGNANCY: ICD-10-CM

## 2023-05-03 DIAGNOSIS — M54.89 OTHER DORSALGIA: ICD-10-CM

## 2023-05-03 DIAGNOSIS — Z86.79 PERSONAL HISTORY OF OTHER DISEASES OF THE CIRCULATORY SYSTEM: ICD-10-CM

## 2023-05-03 DIAGNOSIS — Z3A.18 18 WEEKS GESTATION OF PREGNANCY: ICD-10-CM

## 2023-05-03 DIAGNOSIS — Z79.82 LONG TERM (CURRENT) USE OF ASPIRIN: ICD-10-CM

## 2023-05-03 LAB
ALBUMIN SERPL ELPH-MCNC: 4.1 G/DL — SIGNIFICANT CHANGE UP (ref 3.5–5.2)
ALP SERPL-CCNC: 54 U/L — SIGNIFICANT CHANGE UP (ref 30–115)
ALT FLD-CCNC: 13 U/L — SIGNIFICANT CHANGE UP (ref 0–41)
ANION GAP SERPL CALC-SCNC: 9 MMOL/L — SIGNIFICANT CHANGE UP (ref 7–14)
AST SERPL-CCNC: 17 U/L — SIGNIFICANT CHANGE UP (ref 0–41)
BASOPHILS # BLD AUTO: 0.02 K/UL — SIGNIFICANT CHANGE UP (ref 0–0.2)
BASOPHILS NFR BLD AUTO: 0.1 % — SIGNIFICANT CHANGE UP (ref 0–1)
BILIRUB SERPL-MCNC: 0.5 MG/DL — SIGNIFICANT CHANGE UP (ref 0.2–1.2)
BUN SERPL-MCNC: 6 MG/DL — LOW (ref 10–20)
CALCIUM SERPL-MCNC: 9.9 MG/DL — SIGNIFICANT CHANGE UP (ref 8.4–10.4)
CHLORIDE SERPL-SCNC: 106 MMOL/L — SIGNIFICANT CHANGE UP (ref 98–110)
CO2 SERPL-SCNC: 21 MMOL/L — SIGNIFICANT CHANGE UP (ref 17–32)
CREAT SERPL-MCNC: 0.5 MG/DL — LOW (ref 0.7–1.5)
D DIMER BLD IA.RAPID-MCNC: 307 NG/ML DDU — HIGH
EGFR: 127 ML/MIN/1.73M2 — SIGNIFICANT CHANGE UP
EOSINOPHIL # BLD AUTO: 0.03 K/UL — SIGNIFICANT CHANGE UP (ref 0–0.7)
EOSINOPHIL NFR BLD AUTO: 0.2 % — SIGNIFICANT CHANGE UP (ref 0–8)
GLUCOSE SERPL-MCNC: 94 MG/DL — SIGNIFICANT CHANGE UP (ref 70–99)
HCT VFR BLD CALC: 37.3 % — SIGNIFICANT CHANGE UP (ref 37–47)
HGB BLD-MCNC: 12.8 G/DL — SIGNIFICANT CHANGE UP (ref 12–16)
IMM GRANULOCYTES NFR BLD AUTO: 0.4 % — HIGH (ref 0.1–0.3)
LIDOCAIN IGE QN: 32 U/L — SIGNIFICANT CHANGE UP (ref 7–60)
LYMPHOCYTES # BLD AUTO: 1.4 K/UL — SIGNIFICANT CHANGE UP (ref 1.2–3.4)
LYMPHOCYTES # BLD AUTO: 10.2 % — LOW (ref 20.5–51.1)
MAGNESIUM SERPL-MCNC: 1.8 MG/DL — SIGNIFICANT CHANGE UP (ref 1.8–2.4)
MCHC RBC-ENTMCNC: 29.2 PG — SIGNIFICANT CHANGE UP (ref 27–31)
MCHC RBC-ENTMCNC: 34.3 G/DL — SIGNIFICANT CHANGE UP (ref 32–37)
MCV RBC AUTO: 85.2 FL — SIGNIFICANT CHANGE UP (ref 81–99)
MONOCYTES # BLD AUTO: 0.7 K/UL — HIGH (ref 0.1–0.6)
MONOCYTES NFR BLD AUTO: 5.1 % — SIGNIFICANT CHANGE UP (ref 1.7–9.3)
NEUTROPHILS # BLD AUTO: 11.5 K/UL — HIGH (ref 1.4–6.5)
NEUTROPHILS NFR BLD AUTO: 84 % — HIGH (ref 42.2–75.2)
NRBC # BLD: 0 /100 WBCS — SIGNIFICANT CHANGE UP (ref 0–0)
NT-PROBNP SERPL-SCNC: 119 PG/ML — SIGNIFICANT CHANGE UP (ref 0–300)
PLATELET # BLD AUTO: 242 K/UL — SIGNIFICANT CHANGE UP (ref 130–400)
PMV BLD: 9.6 FL — SIGNIFICANT CHANGE UP (ref 7.4–10.4)
POTASSIUM SERPL-MCNC: 4.2 MMOL/L — SIGNIFICANT CHANGE UP (ref 3.5–5)
POTASSIUM SERPL-SCNC: 4.2 MMOL/L — SIGNIFICANT CHANGE UP (ref 3.5–5)
PROT SERPL-MCNC: 6.8 G/DL — SIGNIFICANT CHANGE UP (ref 6–8)
RBC # BLD: 4.38 M/UL — SIGNIFICANT CHANGE UP (ref 4.2–5.4)
RBC # FLD: 16.4 % — HIGH (ref 11.5–14.5)
SODIUM SERPL-SCNC: 136 MMOL/L — SIGNIFICANT CHANGE UP (ref 135–146)
TROPONIN T SERPL-MCNC: <0.01 NG/ML — SIGNIFICANT CHANGE UP
WBC # BLD: 13.71 K/UL — HIGH (ref 4.8–10.8)
WBC # FLD AUTO: 13.71 K/UL — HIGH (ref 4.8–10.8)

## 2023-05-03 PROCEDURE — 80053 COMPREHEN METABOLIC PANEL: CPT

## 2023-05-03 PROCEDURE — 71045 X-RAY EXAM CHEST 1 VIEW: CPT | Mod: 26

## 2023-05-03 PROCEDURE — 83880 ASSAY OF NATRIURETIC PEPTIDE: CPT

## 2023-05-03 PROCEDURE — 93306 TTE W/DOPPLER COMPLETE: CPT

## 2023-05-03 PROCEDURE — 85025 COMPLETE CBC W/AUTO DIFF WBC: CPT

## 2023-05-03 PROCEDURE — 76857 US EXAM PELVIC LIMITED: CPT

## 2023-05-03 PROCEDURE — 83690 ASSAY OF LIPASE: CPT

## 2023-05-03 PROCEDURE — 71045 X-RAY EXAM CHEST 1 VIEW: CPT

## 2023-05-03 PROCEDURE — 85379 FIBRIN DEGRADATION QUANT: CPT

## 2023-05-03 PROCEDURE — 93306 TTE W/DOPPLER COMPLETE: CPT | Mod: 26

## 2023-05-03 PROCEDURE — 99285 EMERGENCY DEPT VISIT HI MDM: CPT | Mod: 25

## 2023-05-03 PROCEDURE — 93005 ELECTROCARDIOGRAM TRACING: CPT

## 2023-05-03 PROCEDURE — 83735 ASSAY OF MAGNESIUM: CPT

## 2023-05-03 PROCEDURE — 36415 COLL VENOUS BLD VENIPUNCTURE: CPT

## 2023-05-03 PROCEDURE — 96374 THER/PROPH/DIAG INJ IV PUSH: CPT | Mod: XU

## 2023-05-03 PROCEDURE — 76705 ECHO EXAM OF ABDOMEN: CPT

## 2023-05-03 PROCEDURE — 93308 TTE F-UP OR LMTD: CPT

## 2023-05-03 PROCEDURE — 93010 ELECTROCARDIOGRAM REPORT: CPT

## 2023-05-03 PROCEDURE — 96375 TX/PRO/DX INJ NEW DRUG ADDON: CPT | Mod: XU

## 2023-05-03 PROCEDURE — 99284 EMERGENCY DEPT VISIT MOD MDM: CPT

## 2023-05-03 PROCEDURE — 84484 ASSAY OF TROPONIN QUANT: CPT

## 2023-05-03 RX ORDER — FAMOTIDINE 10 MG/ML
20 INJECTION INTRAVENOUS ONCE
Refills: 0 | Status: COMPLETED | OUTPATIENT
Start: 2023-05-03 | End: 2023-05-03

## 2023-05-03 RX ORDER — FAMOTIDINE 10 MG/ML
1 INJECTION INTRAVENOUS
Qty: 30 | Refills: 0
Start: 2023-05-03 | End: 2023-06-01

## 2023-05-03 RX ORDER — ONDANSETRON 8 MG/1
4 TABLET, FILM COATED ORAL ONCE
Refills: 0 | Status: COMPLETED | OUTPATIENT
Start: 2023-05-03 | End: 2023-05-03

## 2023-05-03 RX ORDER — SODIUM CHLORIDE 9 MG/ML
1000 INJECTION INTRAMUSCULAR; INTRAVENOUS; SUBCUTANEOUS ONCE
Refills: 0 | Status: COMPLETED | OUTPATIENT
Start: 2023-05-03 | End: 2023-05-03

## 2023-05-03 RX ADMIN — ONDANSETRON 4 MILLIGRAM(S): 8 TABLET, FILM COATED ORAL at 09:22

## 2023-05-03 RX ADMIN — SODIUM CHLORIDE 1000 MILLILITER(S): 9 INJECTION INTRAMUSCULAR; INTRAVENOUS; SUBCUTANEOUS at 12:10

## 2023-05-03 RX ADMIN — FAMOTIDINE 100 MILLIGRAM(S): 10 INJECTION INTRAVENOUS at 11:37

## 2023-05-03 NOTE — ED PROVIDER NOTE - NS ED ROS FT
Constitutional: (-) fever  Eyes/ENT: (-) blurry vision, (-) epistaxis  Cardiovascular: (+) chest pain, (-) syncope  Respiratory: (-) cough, (+) shortness of breath  Gastrointestinal: (+) vomiting, (-) diarrhea  Musculoskeletal: (-) neck pain, (-) back pain, (-) joint pain  Integumentary: (-) rash, (-) edema  Neurological: (-) headache, (-) altered mental status

## 2023-05-03 NOTE — ED PROVIDER NOTE - PHYSICAL EXAMINATION
Vital Signs: I have reviewed the initial vital signs.  Constitutional: NAD, well-nourished, appears stated age, no acute distress.  HEENT: Airway patent, moist MM, no erythema/swelling/deformity of oral structures. EOMI, PERRLA.  CV: regular rate, regular rhythm, well-perfused extremities, 2+ b/l DP and radial pulses equal.  Lungs: BCTA, no increased WOB.  ABD: +epigastric tenderness, ND, no guarding or rebound, no pulsatile mass, no hernias.   MSK: Neck supple, nontender, nl ROM, no stepoff. Chest nontender. Back nontender. Ext nontender, nl rom, no deformity.   INTEG: Skin warm, dry, no rash.  NEURO: A&Ox3, normal strength, nl sensation throughout, normal speech.   PSYCH: Calm, cooperative, normal affect and interaction.

## 2023-05-03 NOTE — ED PROVIDER NOTE - PATIENT PORTAL LINK FT
You can access the FollowMyHealth Patient Portal offered by [facility] by registering at the following website: [email or web address]. By joining [employer]’s FollowMyHealth portal, you will also be able to view your health information using other applications (apps) compatible with our system.

## 2023-05-03 NOTE — ED PROVIDER NOTE - CARE PROVIDER_API CALL
Conrado Singh)  Cardiovascular Disease; Internal Medicine  [address] [place] [address]  Phone: [phone]  Fax: [phone]  Follow Up Time:

## 2023-05-03 NOTE — ED PROVIDER NOTE - OBJECTIVE STATEMENT
32-year-old female with history of mitral valve replacement 11/22, currently 18 weeks pregnant presents to the ED complaining of upper back pain with chest pain and shortness of breath that started last night.  Patient states has epigastric pain with nausea and vomiting x1.  No fever, chills, leg pain, leg swelling, abdominal pain, diarrhea, vaginal bleeding or urinary symptoms.

## 2023-05-03 NOTE — ED PROVIDER NOTE - CLINICAL SUMMARY MEDICAL DECISION MAKING FREE TEXT BOX
No distress.  Labs reassuring.  Adjsuted ddimer negative.  Echo unchanged from previous.  Feels better after Pepcid.  DC home.  Strict return instructions discussed.

## 2023-05-04 PROCEDURE — 76705 ECHO EXAM OF ABDOMEN: CPT | Mod: 26

## 2023-05-04 PROCEDURE — 76857 US EXAM PELVIC LIMITED: CPT | Mod: 26

## 2023-05-04 PROCEDURE — 93308 TTE F-UP OR LMTD: CPT | Mod: 26

## 2023-05-11 NOTE — PHYSICAL THERAPY INITIAL EVALUATION ADULT - TRANSFER TRAINING, PT EVAL
LTG 3: Pt will be independent with all transfers within 4 weeks. Sotyktu Pregnancy And Lactation Text: There is insufficient data to evaluate whether or not Sotyktu is safe to use during pregnancy.? ?It is not known if Sotyktu passes into breast milk and whether or not it is safe to use when breastfeeding.??

## 2023-05-15 ENCOUNTER — APPOINTMENT (OUTPATIENT)
Dept: ANTEPARTUM | Facility: CLINIC | Age: 33
End: 2023-05-15
Payer: COMMERCIAL

## 2023-05-15 ENCOUNTER — ASOB RESULT (OUTPATIENT)
Age: 33
End: 2023-05-15

## 2023-05-15 VITALS
SYSTOLIC BLOOD PRESSURE: 100 MMHG | HEART RATE: 87 BPM | WEIGHT: 162 LBS | HEIGHT: 63 IN | BODY MASS INDEX: 28.7 KG/M2 | DIASTOLIC BLOOD PRESSURE: 68 MMHG

## 2023-05-15 PROCEDURE — 76817 TRANSVAGINAL US OBSTETRIC: CPT

## 2023-05-15 PROCEDURE — 76805 OB US >/= 14 WKS SNGL FETUS: CPT

## 2023-05-22 ENCOUNTER — APPOINTMENT (OUTPATIENT)
Dept: PEDIATRIC CARDIOLOGY | Facility: CLINIC | Age: 33
End: 2023-05-22
Payer: COMMERCIAL

## 2023-05-22 PROCEDURE — 99215 OFFICE O/P EST HI 40 MIN: CPT | Mod: 25

## 2023-05-22 PROCEDURE — 76827 ECHO EXAM OF FETAL HEART: CPT

## 2023-05-22 PROCEDURE — 76825 ECHO EXAM OF FETAL HEART: CPT

## 2023-05-22 PROCEDURE — 99205 OFFICE O/P NEW HI 60 MIN: CPT | Mod: 25

## 2023-05-22 PROCEDURE — 93325 DOPPLER ECHO COLOR FLOW MAPG: CPT

## 2023-05-22 NOTE — DISCUSSION/SUMMARY
[FreeTextEntry1] : Today's echocardiogram was normal. Please see attached report.\par Given family history of VSD and mitral valve abnormality predisposing to endocarditis,  non urgent outpatient evaluation at 1-2 months, sooner if any concerns.\par \par Please do not hesitate to contact me if you have any questions.\par \par Almas Street MD, MS, FAAP, FACC\par Attending Physician, Pediatric Cardiology\par [facility]\par [address]\[place] [address]\par Office: [phone]\par Fax: [phone]\par Email: [email or web address] \par \par I have spent 60 minutes of time on the encounter excluding separately reported services.\par \par

## 2023-05-22 NOTE — HISTORY OF PRESENT ILLNESS
[FreeTextEntry1] : Dear Dr. PURNIMA GARRETT,\par \par I had the pleasure of seeing your patient, ELBA TO, in my office today, 2023. She was referred to pediatric cardiology for fetal echocardiogram.\par \par Maternal history of mitral valve rupture -- endocarditis? Additionally h/o VSD s/p repair at [facility] w/ Dr. Doll\par ELBA has been doing well from a clinical standpoint.\par \par Today's echocardiogram was normal. Please see attached report.\par Given family history of VSD and mitral valve abnormality predisposing to endocarditis,  non urgent outpatient evaluation at 1-2 months, sooner if any concerns.\par \par \par

## 2023-05-22 NOTE — PHYSICAL EXAM
[General Appearance - Well Nourished] : well nourished [General Appearance - Well-Appearing] : well appearing

## 2023-05-22 NOTE — REASON FOR VISIT
[Initial Consultation] : an initial consultation for [FreeTextEntry3] : Fetal echocardiogram; maternal mitral valve abnormality, VSD

## 2023-05-23 ENCOUNTER — APPOINTMENT (OUTPATIENT)
Dept: OBGYN | Facility: CLINIC | Age: 33
End: 2023-05-23
Payer: COMMERCIAL

## 2023-05-23 VITALS
HEIGHT: 63 IN | TEMPERATURE: 98.6 F | BODY MASS INDEX: 28.35 KG/M2 | DIASTOLIC BLOOD PRESSURE: 63 MMHG | HEART RATE: 75 BPM | SYSTOLIC BLOOD PRESSURE: 98 MMHG | WEIGHT: 160 LBS

## 2023-05-23 LAB
BILIRUB UR QL STRIP: NORMAL
CLARITY UR: CLEAR
COLLECTION METHOD: NORMAL
GLUCOSE UR-MCNC: NORMAL
HCG UR QL: 0.2 EU/DL
HGB UR QL STRIP.AUTO: ABNORMAL
KETONES UR-MCNC: NORMAL
LEUKOCYTE ESTERASE UR QL STRIP: ABNORMAL
NITRITE UR QL STRIP: NORMAL
PH UR STRIP: 6.5
PROT UR STRIP-MCNC: NORMAL
SP GR UR STRIP: 1.02

## 2023-05-23 PROCEDURE — 0502F SUBSEQUENT PRENATAL CARE: CPT

## 2023-05-23 PROCEDURE — 81003 URINALYSIS AUTO W/O SCOPE: CPT | Mod: QW

## 2023-05-24 NOTE — OB SUMMARY
[Date: _____] : Date: [unfilled] [Gestational Age: _____] : Gestational Age: [unfilled] [FreeTextEntry1] : Pt is here for her 21 week OB check-up, pt has no complaints today\par .\par wt- 160 ht- 5 '3 b/p- 98/63 pulse- 75 temp- 98.6 pro- neg glu- neg \par  [de-identified] : sm

## 2023-05-26 LAB — BACTERIA UR CULT: NORMAL

## 2023-06-01 ENCOUNTER — ASOB RESULT (OUTPATIENT)
Age: 33
End: 2023-06-01

## 2023-06-01 ENCOUNTER — APPOINTMENT (OUTPATIENT)
Dept: ANTEPARTUM | Facility: CLINIC | Age: 33
End: 2023-06-01
Payer: COMMERCIAL

## 2023-06-01 PROCEDURE — 76816 OB US FOLLOW-UP PER FETUS: CPT

## 2023-06-05 ENCOUNTER — NON-APPOINTMENT (OUTPATIENT)
Age: 33
End: 2023-06-05

## 2023-06-07 ENCOUNTER — RX RENEWAL (OUTPATIENT)
Age: 33
End: 2023-06-07

## 2023-06-07 LAB — BACTERIA UR CULT: NORMAL

## 2023-06-08 PROBLEM — Z31.5 ENCOUNTER FOR PROCREATIVE GENETIC COUNSELING AND TESTING: Status: ACTIVE | Noted: 2023-04-25

## 2023-06-08 NOTE — HISTORY OF PRESENT ILLNESS
[Normal Amount/Duration] :  normal amount and duration [Regular Cycle Intervals] : periods have been regular [Frequency: Q ___ days] : menstrual periods occur approximately every [unfilled] days [Menarche Age: ____] : age at menarche was [unfilled] [Currently Active] : currently active [Men] : men [No] : No [Patient reported PAP Smear was normal] : Patient reported PAP Smear was normal [Gonorrhea test offered] : Gonorrhea test offered [Chlamydia test offered] : Chlamydia test offered [Trichomonas test offered] : Trichomonas test offered [N] : Patient does not use contraception [Y] : Patient is sexually active [TextBox_4] : 32-year-old para 0-0-1-0 with LMP 12/26/2022 came for GYN evaluation and confirmation of pregnancy due to home pregnancy test positive.  She states at the end of January when she was supposed to get her regular.  She had some spotting but no heavy bleeding or pelvic pain since.  She denies discharge, dysuria or other GYN complaints.  She denies history of abnormal Pap, HPV, STDs, fibroids or ovarian cysts.  She is sexually active with 1 male partner.  Patient states since her last visit in November 2022 she was noted to have her mitral valve rupture and needed open heart surgery and subsequently had endocarditis and was treated with ID as well.  Patient has had strict ID and cardiac follow-up over the last several months and she has been stable and is feeling well.  She was previously on Eliquis however when she found out she was pregnant she discussed this with her cardiologist and they said that 81 mg low-dose aspirin was sufficient because of the time passed since her surgery.  Patient has been taking this daily.  She states she is tolerating diet without any significant nausea or vomiting currently.\par \par FOB: 29-year-old  American healthy male with no other children.  His maternal uncle had Down syndrome but no other known genetic disease in the family.\par \par Patient counseled extensively on her high risk comorbidities and will follow closely with multiple disciplinary team with MFM, cardiac, ID as needed as well as her routine obstetrical visits.  Patient understood all counseling and concerns and all questions answered satisfactorily. [PapSmeardate] : 9/2022 [LMPDate] : 12/26/2022 [PGHxTotal] : 2 [PGHxFullTerm] : 0 [PGHxPremature] : 0 [PGHxAbortions] : 1 [[facility] Cancer CenterxLiving] : 0 [FreeTextEntry1] : 12/26/22

## 2023-06-08 NOTE — DISCUSSION/SUMMARY
[FreeTextEntry1] : Transvaginal ultrasound done to evaluate pregnancy, patient counseled that single IUP with positive cardiac activity was noted.  CRL was consistent with LMP dating giving an JESSIE of 10/2/2023.  Patient given strict pain and bleeding precautions and pregnancy expectations and counseling done.  Patient understood all counseling and all questions answered satisfactorily.  We discussed NIPT screening and patient would like to do at appropriate gestational age.\par \par A: 32-year-old with amenorrhea, incidental pregnancy, status post cardiac surgery/mitral valve replacement, high risk pregnancy, BMI 26\par \par P: GYN exam done\par Genital culture done\par Safe sex practices\par Pain and bleeding precautions\par Transvaginal ultrasound done to confirm IUP\par Continue prenatal vitamins and aspirin\par Encourage healthy diet and activity as tolerated\par Prenatal labs and NIPT next visit\par Referral for MFM consultation and NT\par Follow-up 2 weeks or as needed

## 2023-06-08 NOTE — OB SUMMARY
[Date: _____] : Date: [unfilled] [Gestational Age: _____] : Gestational Age: [unfilled] [FreeTextEntry1] : Pt is here for her 10 week OB check-up, pt states she has abnormal spotting and blood clots last week went to ER as directed, was told everything normal. Pt states 2 days ago had a heavier flow but just for a few hours.\par \par wt- 150 ht- 5 '3 b/p- 96/69 pulse- 86 temp- 98.6 pro- neg glu- neg  [de-identified] : sm

## 2023-06-08 NOTE — PHYSICAL EXAM
[Appropriately responsive] : appropriately responsive [Alert] : alert [No Acute Distress] : no acute distress [Regular Rate Rhythm] : regular rate rhythm [Soft] : soft [Non-tender] : non-tender [Non-distended] : non-distended [No Mass] : no mass [Oriented x3] : oriented x3 [No Lesions] : no lesions  [Labia Majora] : normal [Labia Minora] : normal [No Bleeding] : There was no active vaginal bleeding [Normal] : normal [Tenderness] : nontender [Enlarged ___ wks] : not enlarged [Mass ___ cm] : no uterine mass was palpated [Uterine Adnexae] : normal [FreeTextEntry4] : Genital culture done [FreeTextEntry5] : Closed and long, no active bleeding

## 2023-06-15 ENCOUNTER — APPOINTMENT (OUTPATIENT)
Dept: CARDIOLOGY | Facility: CLINIC | Age: 33
End: 2023-06-15
Payer: COMMERCIAL

## 2023-06-15 VITALS
HEIGHT: 63 IN | BODY MASS INDEX: 28.88 KG/M2 | SYSTOLIC BLOOD PRESSURE: 100 MMHG | HEART RATE: 84 BPM | DIASTOLIC BLOOD PRESSURE: 68 MMHG | WEIGHT: 163 LBS

## 2023-06-15 PROCEDURE — 93000 ELECTROCARDIOGRAM COMPLETE: CPT

## 2023-06-15 PROCEDURE — 99214 OFFICE O/P EST MOD 30 MIN: CPT | Mod: 25

## 2023-06-15 NOTE — OB SUMMARY
[Date: _____] : Date: [unfilled] [Gestational Age: _____] : Gestational Age: [unfilled] [FreeTextEntry1] : Pt is here for her 15 week OB check-up, pt has no complaints today.\par \par wt- 152 ht- 5 '3 temp- 98.6 b/p- 94/61 pulse- 83 pro- neg glu- neg [de-identified] : sm

## 2023-06-15 NOTE — PHYSICAL EXAM
[No Acute Distress] : no acute distress [Normal S1, S2] : normal S1, S2 [No Murmur] : no murmur [Clear Lung Fields] : clear lung fields [No Respiratory Distress] : no respiratory distress  [Normal Gait] : normal gait [Normal] : moves all extremities, no focal deficits, normal speech [Alert and Oriented] : alert and oriented [de-identified] : normal healing of mid sternal surgical sternal wound, no edema, non tenderness, no sweliing.

## 2023-06-15 NOTE — HISTORY OF PRESENT ILLNESS
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 Treated with IV ABX and transferred to [facility] for surgical intervention. on 11/22/22 she underwent successful mitral valve vegetation resection and replacement with 31 mm tissue epic valve. VSD closed primarily. \par \par Patient recovered well, followed with CT Sx and ID on regular basis, finished full course of IV ABx as recommended by ID and pic line removed.\par Repeated echo on 2/2023. showed normal EF, prosthetic mitral valve in place functioning normally \par \par later on patient get pregnant currently , eliquis was stopped (she already received for 3 months)\par On 5/2023 patient presented to ED for atypical chest pain (non cardiac) repeated echo was stable normal EF \par CE negative \par \par Currently patient feel much better, denies chest pain, shortness of breath , TINAJERO and palpitations no syncope. \par  BP controlled. \par \par EKG showed sinus rhythm first degree AV block  improved , no ischemic changes.

## 2023-06-15 NOTE — REVIEW OF SYSTEMS
[Negative] : Psychiatric [SOB] : no shortness of breath [Dyspnea on exertion] : not dyspnea during exertion [Palpitations] : no palpitations [Chest Discomfort] : no chest discomfort [Orthopnea] : no orthopnea [Syncope] : no syncope

## 2023-06-15 NOTE — ASSESSMENT
[FreeTextEntry1] : 1) 11/22/22 s/p successful mitral valve vegetation resection and replacement with 31 mm tissue epic valve. VSD closed primarily.. \par Currently stable, repeated echo 2/2023 and 5/2023  normal EF normal functioning prosthetic mitral valve \par continue asa \par \par 2)Event of atypical chest pain on 5/2023 non cardiac likely musculoskeletal currently resolved \par \par 2)Pregnancy \par eliquis stopped \par following GYN \par \par 3)First degree av block \par improved ND interval \par asymptomatic , hemodynamically stable\par \par follow up in 3 months\par \par Patient advised to call back or seed medical advise if new symptoms developed. \par

## 2023-06-21 ENCOUNTER — RESULT CHARGE (OUTPATIENT)
Age: 33
End: 2023-06-21

## 2023-06-21 ENCOUNTER — APPOINTMENT (OUTPATIENT)
Dept: OBGYN | Facility: CLINIC | Age: 33
End: 2023-06-21
Payer: COMMERCIAL

## 2023-06-21 VITALS
HEART RATE: 81 BPM | WEIGHT: 163 LBS | TEMPERATURE: 98.6 F | BODY MASS INDEX: 28.88 KG/M2 | SYSTOLIC BLOOD PRESSURE: 96 MMHG | HEIGHT: 63 IN | DIASTOLIC BLOOD PRESSURE: 66 MMHG

## 2023-06-21 LAB
BILIRUB UR QL STRIP: NORMAL
CLARITY UR: CLEAR
COLLECTION METHOD: NORMAL
GLUCOSE UR-MCNC: NORMAL
HCG UR QL: 0.2 EU/DL
HGB UR QL STRIP.AUTO: NORMAL
KETONES UR-MCNC: NORMAL
LEUKOCYTE ESTERASE UR QL STRIP: ABNORMAL
NITRITE UR QL STRIP: NORMAL
PH UR STRIP: 6
PROT UR STRIP-MCNC: NORMAL
SP GR UR STRIP: >=1.03

## 2023-06-21 PROCEDURE — 0502F SUBSEQUENT PRENATAL CARE: CPT

## 2023-06-21 PROCEDURE — 81003 URINALYSIS AUTO W/O SCOPE: CPT | Mod: QW

## 2023-06-28 ENCOUNTER — NON-APPOINTMENT (OUTPATIENT)
Age: 33
End: 2023-06-28

## 2023-06-28 LAB — GLUCOSE 1H P 50 G GLC PO SERPL-MCNC: 143 MG/DL

## 2023-06-28 NOTE — OB SUMMARY
[Date: _____] : Date: [unfilled] [Gestational Age: _____] : Gestational Age: [unfilled] [FreeTextEntry1] : Pt is here for her 25 week OB check-up and Glucose testing, pt has no complaints today.\par \par wt- 163 ht- 5 '3 b/p- 96/66 pulse- 81 temp- 98.6 pro- neg glu- neg \par \par Exp- 1/31/25\par Lot#- [id number] [de-identified] : sm

## 2023-06-29 ENCOUNTER — ASOB RESULT (OUTPATIENT)
Age: 33
End: 2023-06-29

## 2023-06-29 ENCOUNTER — APPOINTMENT (OUTPATIENT)
Dept: ANTEPARTUM | Facility: CLINIC | Age: 33
End: 2023-06-29
Payer: COMMERCIAL

## 2023-06-29 VITALS
HEIGHT: 63 IN | DIASTOLIC BLOOD PRESSURE: 72 MMHG | HEART RATE: 100 BPM | SYSTOLIC BLOOD PRESSURE: 100 MMHG | BODY MASS INDEX: 29.06 KG/M2 | WEIGHT: 164 LBS

## 2023-06-29 PROCEDURE — 76816 OB US FOLLOW-UP PER FETUS: CPT

## 2023-07-06 ENCOUNTER — RX RENEWAL (OUTPATIENT)
Age: 33
End: 2023-07-06

## 2023-07-06 RX ORDER — ASPIRIN 81 MG/1
81 TABLET, COATED ORAL
Qty: 30 | Refills: 0 | Status: ACTIVE | COMMUNITY
Start: 2023-07-06 | End: 1900-01-01

## 2023-07-07 ENCOUNTER — RX RENEWAL (OUTPATIENT)
Age: 33
End: 2023-07-07

## 2023-07-11 ENCOUNTER — APPOINTMENT (OUTPATIENT)
Dept: OBGYN | Facility: CLINIC | Age: 33
End: 2023-07-11
Payer: COMMERCIAL

## 2023-07-11 ENCOUNTER — NON-APPOINTMENT (OUTPATIENT)
Age: 33
End: 2023-07-11

## 2023-07-11 VITALS
BODY MASS INDEX: 29.23 KG/M2 | SYSTOLIC BLOOD PRESSURE: 110 MMHG | TEMPERATURE: 98.6 F | HEIGHT: 63 IN | DIASTOLIC BLOOD PRESSURE: 74 MMHG | WEIGHT: 165 LBS | HEART RATE: 83 BPM

## 2023-07-11 DIAGNOSIS — Z23 ENCOUNTER FOR IMMUNIZATION: ICD-10-CM

## 2023-07-11 DIAGNOSIS — Z33.1 PREGNANT STATE, INCIDENTAL: ICD-10-CM

## 2023-07-11 DIAGNOSIS — Z87.42 PERSONAL HISTORY OF OTHER DISEASES OF THE FEMALE GENITAL TRACT: ICD-10-CM

## 2023-07-11 LAB
BILIRUB UR QL STRIP: NORMAL
CLARITY UR: CLEAR
COLLECTION METHOD: NORMAL
GLUCOSE UR-MCNC: NORMAL
HCG UR QL: 0.2 EU/DL
HGB UR QL STRIP.AUTO: ABNORMAL
KETONES UR-MCNC: NORMAL
LEUKOCYTE ESTERASE UR QL STRIP: NORMAL
NITRITE UR QL STRIP: NORMAL
PH UR STRIP: 6
PROT UR STRIP-MCNC: NORMAL
SP GR UR STRIP: 1.02

## 2023-07-11 PROCEDURE — 81003 URINALYSIS AUTO W/O SCOPE: CPT | Mod: QW

## 2023-07-11 PROCEDURE — 0502F SUBSEQUENT PRENATAL CARE: CPT | Mod: 25

## 2023-07-11 PROCEDURE — 90715 TDAP VACCINE 7 YRS/> IM: CPT

## 2023-07-11 PROCEDURE — 90471 IMMUNIZATION ADMIN: CPT

## 2023-07-11 NOTE — OB SUMMARY
[Date: _____] : Date: [unfilled] [Gestational Age: _____] : Gestational Age: [unfilled] [FreeTextEntry1] : Pt is here for her 28 week OB check-up and Tdap injection, pt has no complaints today.\par \par wt- 165 ht- 5 '3 b/p- 110/74 pulse- 83 temp- 98.6 pro- neg glu- neg \par \par Tdap\par NDC- 67546-782-11\par Lot#- [id number]\par Exp- 3/18/25 [de-identified] : sm

## 2023-07-25 ENCOUNTER — APPOINTMENT (OUTPATIENT)
Dept: ANTEPARTUM | Facility: CLINIC | Age: 33
End: 2023-07-25
Payer: COMMERCIAL

## 2023-07-25 ENCOUNTER — ASOB RESULT (OUTPATIENT)
Age: 33
End: 2023-07-25

## 2023-07-25 VITALS
WEIGHT: 169 LBS | HEART RATE: 87 BPM | SYSTOLIC BLOOD PRESSURE: 105 MMHG | DIASTOLIC BLOOD PRESSURE: 75 MMHG | HEIGHT: 63 IN | BODY MASS INDEX: 29.95 KG/M2

## 2023-07-25 PROCEDURE — 76816 OB US FOLLOW-UP PER FETUS: CPT

## 2023-07-31 ENCOUNTER — APPOINTMENT (OUTPATIENT)
Dept: OBGYN | Facility: CLINIC | Age: 33
End: 2023-07-31
Payer: COMMERCIAL

## 2023-07-31 VITALS
HEART RATE: 87 BPM | DIASTOLIC BLOOD PRESSURE: 73 MMHG | WEIGHT: 172 LBS | SYSTOLIC BLOOD PRESSURE: 111 MMHG | HEIGHT: 63 IN | BODY MASS INDEX: 30.48 KG/M2

## 2023-07-31 LAB
BILIRUB UR QL STRIP: NORMAL
CLARITY UR: CLEAR
COLLECTION METHOD: NORMAL
GLUCOSE UR-MCNC: NORMAL
HCG UR QL: 0.2 EU/DL
HGB UR QL STRIP.AUTO: ABNORMAL
KETONES UR-MCNC: NORMAL
LEUKOCYTE ESTERASE UR QL STRIP: NORMAL
NITRITE UR QL STRIP: NORMAL
PH UR STRIP: 7
PROT UR STRIP-MCNC: NORMAL
SP GR UR STRIP: 1.02

## 2023-07-31 PROCEDURE — 81003 URINALYSIS AUTO W/O SCOPE: CPT | Mod: NC,QW

## 2023-07-31 PROCEDURE — 0502F SUBSEQUENT PRENATAL CARE: CPT

## 2023-07-31 NOTE — OB SUMMARY
[Date: _____] : Date: [unfilled] [Gestational Age: _____] : Gestational Age: [unfilled] [FreeTextEntry1] : Pt is here for her 31-week OB check-up, pt has no complaints today.   wt- 172 ht- 5 '2 b/p- 111/73 pulse- 87 temp- 98.6 pro- neg glu- neg  [de-identified] : sm

## 2023-08-05 ENCOUNTER — RX RENEWAL (OUTPATIENT)
Age: 33
End: 2023-08-05

## 2023-08-08 ENCOUNTER — APPOINTMENT (OUTPATIENT)
Dept: ANTEPARTUM | Facility: CLINIC | Age: 33
End: 2023-08-08
Payer: COMMERCIAL

## 2023-08-08 ENCOUNTER — ASOB RESULT (OUTPATIENT)
Age: 33
End: 2023-08-08

## 2023-08-08 VITALS
WEIGHT: 171 LBS | HEART RATE: 99 BPM | DIASTOLIC BLOOD PRESSURE: 72 MMHG | HEIGHT: 63 IN | SYSTOLIC BLOOD PRESSURE: 110 MMHG | BODY MASS INDEX: 30.3 KG/M2

## 2023-08-08 PROCEDURE — 76818 FETAL BIOPHYS PROFILE W/NST: CPT

## 2023-08-08 PROCEDURE — 99211 OFF/OP EST MAY X REQ PHY/QHP: CPT | Mod: 25

## 2023-08-08 RX ORDER — ASPIRIN ENTERIC COATED TABLETS 81 MG 81 MG/1
81 TABLET, DELAYED RELEASE ORAL DAILY
Qty: 90 | Refills: 3 | Status: COMPLETED | COMMUNITY
Start: 2023-06-15 | End: 2023-07-15

## 2023-08-08 RX ORDER — ASPIRIN 81 MG/1
81 TABLET ORAL DAILY
Qty: 30 | Refills: 3 | Status: COMPLETED | COMMUNITY
Start: 2022-11-29 | End: 2023-06-14

## 2023-08-08 NOTE — DISCUSSION/SUMMARY
[FreeTextEntry1] : Ms Verma is a 33-year-old  1 para 0 at 32 weeks of pregnancy with history of infective endocarditis, mitral leaflet rupture and acute MR, treated with IV antibiotics and MR valve replacement (31 mm tissue epic valve). First degree heart block.  She feels well and the baby is active. Biophysical profile 10/10. She is doing well and was sent home with advice.

## 2023-08-15 ENCOUNTER — ASOB RESULT (OUTPATIENT)
Age: 33
End: 2023-08-15

## 2023-08-15 ENCOUNTER — APPOINTMENT (OUTPATIENT)
Dept: ANTEPARTUM | Facility: CLINIC | Age: 33
End: 2023-08-15
Payer: COMMERCIAL

## 2023-08-15 VITALS
DIASTOLIC BLOOD PRESSURE: 76 MMHG | SYSTOLIC BLOOD PRESSURE: 110 MMHG | HEART RATE: 87 BPM | WEIGHT: 173 LBS | HEIGHT: 63 IN | BODY MASS INDEX: 30.65 KG/M2

## 2023-08-15 PROCEDURE — 76818 FETAL BIOPHYS PROFILE W/NST: CPT

## 2023-08-15 PROCEDURE — ZZZZZ: CPT

## 2023-08-15 PROCEDURE — 99211 OFF/OP EST MAY X REQ PHY/QHP: CPT | Mod: 25

## 2023-08-15 NOTE — DISCUSSION/SUMMARY
[FreeTextEntry1] : Ms Verma is a 33-year-old  1 para 0 at 32 weeks of pregnancy with history of infective endocarditis, mitral leaflet rupture and acute MR, treated with IV antibiotics and MR valve replacement (31 mm tissue epic valve).She has first degree heart block since the surgery. She feels well and the baby is active. Biophysical profile 10/10. She is doing well and was sent home with advice

## 2023-08-22 ENCOUNTER — APPOINTMENT (OUTPATIENT)
Dept: ANTEPARTUM | Facility: CLINIC | Age: 33
End: 2023-08-22
Payer: COMMERCIAL

## 2023-08-22 ENCOUNTER — ASOB RESULT (OUTPATIENT)
Age: 33
End: 2023-08-22

## 2023-08-22 VITALS
BODY MASS INDEX: 30.65 KG/M2 | HEART RATE: 90 BPM | DIASTOLIC BLOOD PRESSURE: 74 MMHG | WEIGHT: 173 LBS | SYSTOLIC BLOOD PRESSURE: 110 MMHG | HEIGHT: 63 IN

## 2023-08-22 DIAGNOSIS — Z87.59 PERSONAL HISTORY OF OTHER COMPLICATIONS OF PREGNANCY, CHILDBIRTH AND THE PUERPERIUM: ICD-10-CM

## 2023-08-22 PROCEDURE — 99211 OFF/OP EST MAY X REQ PHY/QHP: CPT | Mod: 25

## 2023-08-22 PROCEDURE — 76816 OB US FOLLOW-UP PER FETUS: CPT

## 2023-08-22 PROCEDURE — 76818 FETAL BIOPHYS PROFILE W/NST: CPT | Mod: 59

## 2023-08-22 NOTE — DISCUSSION/SUMMARY
[FreeTextEntry1] : Ms. Verma is a 33-year-old  1 para 0 at 34 weeks and 1 day of pregnancy with history of infective endocarditis, mitral leaflet rupture and acute MR, treated with IV antibiotics and MR valve replacement (31 mm tissue epic valve) and closure of a VSD. She has first degree heart block since the surgery. She has been well since the surgery. Echocardiogram May 2023 showed mildly enlarged right ventricle with mildly reduced systolic function. Mitral valve well placed and functioning normally. There is paradoxical septal motion consistent with prior surgery. There is a known residual VSD. No changes compared to study done 2022. She feels well and the baby is active. /74 P 90 weight 173 lbs. Abdomen is soft and not tender. There is no edema. Biophysical profile 10/10.  She is doing well and was sent home with advice.

## 2023-08-28 ENCOUNTER — APPOINTMENT (OUTPATIENT)
Dept: OBGYN | Facility: CLINIC | Age: 33
End: 2023-08-28
Payer: COMMERCIAL

## 2023-08-28 VITALS
HEIGHT: 63 IN | BODY MASS INDEX: 30.3 KG/M2 | WEIGHT: 171 LBS | SYSTOLIC BLOOD PRESSURE: 115 MMHG | HEART RATE: 90 BPM | DIASTOLIC BLOOD PRESSURE: 81 MMHG

## 2023-08-28 DIAGNOSIS — J06.9 ACUTE UPPER RESPIRATORY INFECTION, UNSPECIFIED: ICD-10-CM

## 2023-08-28 LAB
BILIRUB UR QL STRIP: NORMAL
CLARITY UR: CLEAR
COLLECTION METHOD: NORMAL
GLUCOSE UR-MCNC: NORMAL
HCG UR QL: 0.2 EU/DL
HGB UR QL STRIP.AUTO: ABNORMAL
KETONES UR-MCNC: NORMAL
LEUKOCYTE ESTERASE UR QL STRIP: NORMAL
NITRITE UR QL STRIP: NORMAL
PH UR STRIP: 6
PROT UR STRIP-MCNC: NORMAL
SP GR UR STRIP: 1.03

## 2023-08-28 PROCEDURE — 0502F SUBSEQUENT PRENATAL CARE: CPT

## 2023-08-28 PROCEDURE — 81003 URINALYSIS AUTO W/O SCOPE: CPT | Mod: NC,QW

## 2023-08-28 NOTE — OB SUMMARY
[Date: _____] : Date: [unfilled] [Gestational Age: _____] : Gestational Age: [unfilled] [FreeTextEntry1] : Patient is here for follow/up pregnancy , weight- 171  B/P- 115/81 , pulse- 90 , urine , glucose- negative , protein- negative  [de-identified] : go

## 2023-08-29 ENCOUNTER — ASOB RESULT (OUTPATIENT)
Age: 33
End: 2023-08-29

## 2023-08-29 ENCOUNTER — APPOINTMENT (OUTPATIENT)
Dept: ANTEPARTUM | Facility: CLINIC | Age: 33
End: 2023-08-29
Payer: COMMERCIAL

## 2023-08-29 VITALS
HEIGHT: 63 IN | WEIGHT: 173 LBS | DIASTOLIC BLOOD PRESSURE: 80 MMHG | SYSTOLIC BLOOD PRESSURE: 120 MMHG | BODY MASS INDEX: 30.65 KG/M2 | HEART RATE: 92 BPM

## 2023-08-29 LAB
HIV1+2 AB SPEC QL IA.RAPID: NONREACTIVE
T PALLIDUM AB SER QL IA: NEGATIVE

## 2023-08-29 PROCEDURE — 76818 FETAL BIOPHYS PROFILE W/NST: CPT

## 2023-08-29 PROCEDURE — 99211 OFF/OP EST MAY X REQ PHY/QHP: CPT | Mod: 25

## 2023-08-29 RX ORDER — AMPICILLIN 500 MG/1
500 CAPSULE ORAL
Qty: 28 | Refills: 0 | Status: COMPLETED | COMMUNITY
Start: 2023-05-02

## 2023-08-29 NOTE — DISCUSSION/SUMMARY
[FreeTextEntry1] : Ms. Verma is a 33-year-old  1 para 0 at 35 weeks and 1 day of pregnancy with history of infective endocarditis, mitral leaflet rupture and acute MR, treated with IV antibiotics and MR valve replacement (31 mm tissue epic valve) and closure of a VSD. She has first degree heart block since the surgery. She has been well since the surgery. Echocardiogram May 2023 showed mildly enlarged right ventricle with mildly reduced systolic function. Mitral valve well placed and functioning normally. There is paradoxical septal motion consistent with prior surgery. There is a known residual VSD. No changes compared to study done 2022. She feels well and the baby is active. /80 P 92 weight 173 lbs. Abdomen is soft and not tender. There is no edema. Biophysical profile 10/10. She is doing well and was sent home with advice.

## 2023-08-30 LAB — B-HEM STREP SPEC QL CULT: ABNORMAL

## 2023-09-03 ENCOUNTER — RX RENEWAL (OUTPATIENT)
Age: 33
End: 2023-09-03

## 2023-09-05 ENCOUNTER — APPOINTMENT (OUTPATIENT)
Dept: OBGYN | Facility: CLINIC | Age: 33
End: 2023-09-05
Payer: COMMERCIAL

## 2023-09-05 VITALS
BODY MASS INDEX: 31.01 KG/M2 | WEIGHT: 175 LBS | SYSTOLIC BLOOD PRESSURE: 120 MMHG | HEART RATE: 91 BPM | HEIGHT: 63 IN | TEMPERATURE: 98.6 F | DIASTOLIC BLOOD PRESSURE: 84 MMHG

## 2023-09-05 LAB
BILIRUB UR QL STRIP: NORMAL
GLUCOSE UR-MCNC: NORMAL
HCG UR QL: 0.2 EU/DL
HGB UR QL STRIP.AUTO: NORMAL
KETONES UR-MCNC: NORMAL
LEUKOCYTE ESTERASE UR QL STRIP: NORMAL
NITRITE UR QL STRIP: NORMAL
PH UR STRIP: 6.5
PROT UR STRIP-MCNC: NORMAL
SP GR UR STRIP: 1.01

## 2023-09-05 PROCEDURE — 81003 URINALYSIS AUTO W/O SCOPE: CPT | Mod: NC,QW

## 2023-09-05 PROCEDURE — 0502F SUBSEQUENT PRENATAL CARE: CPT

## 2023-09-05 NOTE — OB SUMMARY
[Date: _____] : Date: [unfilled] [Gestational Age: _____] : Gestational Age: [unfilled] [FreeTextEntry1] : Pt is here for her 36-week Ob check-up, pt has no complaints today.  wt- 175 ht- 5 '3 b/p- 120/84 pulse- 91 temp- 98.6 pro- neg glu- neg  [de-identified] : sm

## 2023-09-06 ENCOUNTER — APPOINTMENT (OUTPATIENT)
Dept: ANTEPARTUM | Facility: CLINIC | Age: 33
End: 2023-09-06
Payer: COMMERCIAL

## 2023-09-06 ENCOUNTER — ASOB RESULT (OUTPATIENT)
Age: 33
End: 2023-09-06

## 2023-09-06 VITALS
HEIGHT: 63 IN | SYSTOLIC BLOOD PRESSURE: 120 MMHG | WEIGHT: 176 LBS | HEART RATE: 86 BPM | DIASTOLIC BLOOD PRESSURE: 78 MMHG | BODY MASS INDEX: 31.18 KG/M2

## 2023-09-06 PROCEDURE — 99211 OFF/OP EST MAY X REQ PHY/QHP: CPT | Mod: 25

## 2023-09-06 PROCEDURE — 76818 FETAL BIOPHYS PROFILE W/NST: CPT

## 2023-09-06 NOTE — DISCUSSION/SUMMARY
[FreeTextEntry1] : Ms. Verma is a 33-year-old  1 para 0 at 36 weeks and 2 days of pregnancy with history of infective endocarditis, mitral leaflet rupture and acute MR, treated with IV antibiotics and MR valve replacement (31 mm tissue epic valve) and closure of a VSD. She has first degree heart block since the surgery. She has been well since the surgery. Echocardiogram May 2023 showed mildly enlarged right ventricle with mildly reduced systolic function. Mitral valve well placed and functioning normally. There is paradoxical septal motion consistent with prior surgery. There is a known residual VSD. No changes compared to study done 2022. She feels well and the baby is active. /78 P 86 weight 173 lbs. Abdomen is soft and not tender. There is no edema. Biophysical profile 10/10. She is doing well and was sent home with advice.

## 2023-09-12 ENCOUNTER — APPOINTMENT (OUTPATIENT)
Dept: ANTEPARTUM | Facility: CLINIC | Age: 33
End: 2023-09-12
Payer: COMMERCIAL

## 2023-09-12 ENCOUNTER — ASOB RESULT (OUTPATIENT)
Age: 33
End: 2023-09-12

## 2023-09-12 VITALS
BODY MASS INDEX: 31.18 KG/M2 | WEIGHT: 176 LBS | HEIGHT: 63 IN | DIASTOLIC BLOOD PRESSURE: 75 MMHG | SYSTOLIC BLOOD PRESSURE: 118 MMHG | HEART RATE: 86 BPM

## 2023-09-12 DIAGNOSIS — Z3A.35 35 WEEKS GESTATION OF PREGNANCY: ICD-10-CM

## 2023-09-12 DIAGNOSIS — Z3A.36 36 WEEKS GESTATION OF PREGNANCY: ICD-10-CM

## 2023-09-12 PROCEDURE — 99211 OFF/OP EST MAY X REQ PHY/QHP: CPT | Mod: 25

## 2023-09-12 PROCEDURE — 76818 FETAL BIOPHYS PROFILE W/NST: CPT

## 2023-09-14 ENCOUNTER — APPOINTMENT (OUTPATIENT)
Dept: CARDIOLOGY | Facility: CLINIC | Age: 33
End: 2023-09-14
Payer: COMMERCIAL

## 2023-09-14 VITALS
HEART RATE: 84 BPM | BODY MASS INDEX: 31.01 KG/M2 | HEIGHT: 63 IN | SYSTOLIC BLOOD PRESSURE: 112 MMHG | DIASTOLIC BLOOD PRESSURE: 70 MMHG | WEIGHT: 175 LBS

## 2023-09-14 DIAGNOSIS — I05.9 RHEUMATIC MITRAL VALVE DISEASE, UNSPECIFIED: ICD-10-CM

## 2023-09-14 PROCEDURE — 93000 ELECTROCARDIOGRAM COMPLETE: CPT

## 2023-09-14 PROCEDURE — 99213 OFFICE O/P EST LOW 20 MIN: CPT | Mod: 25

## 2023-09-15 ENCOUNTER — APPOINTMENT (OUTPATIENT)
Dept: OBGYN | Facility: CLINIC | Age: 33
End: 2023-09-15
Payer: COMMERCIAL

## 2023-09-15 VITALS
HEART RATE: 87 BPM | SYSTOLIC BLOOD PRESSURE: 104 MMHG | BODY MASS INDEX: 31.18 KG/M2 | HEIGHT: 63 IN | WEIGHT: 176 LBS | TEMPERATURE: 98.6 F | DIASTOLIC BLOOD PRESSURE: 77 MMHG

## 2023-09-15 DIAGNOSIS — R89.4 ABNORMAL IMMUNOLOGICAL FINDINGS IN SPECIMENS FROM OTHER ORGANS, SYSTEMS AND TISSUES: ICD-10-CM

## 2023-09-15 PROCEDURE — 81003 URINALYSIS AUTO W/O SCOPE: CPT | Mod: NC,QW

## 2023-09-15 PROCEDURE — 0502F SUBSEQUENT PRENATAL CARE: CPT

## 2023-09-19 ENCOUNTER — APPOINTMENT (OUTPATIENT)
Dept: ANTEPARTUM | Facility: CLINIC | Age: 33
End: 2023-09-19
Payer: COMMERCIAL

## 2023-09-19 ENCOUNTER — ASOB RESULT (OUTPATIENT)
Age: 33
End: 2023-09-19

## 2023-09-19 PROBLEM — R89.4 POSITIVE TEST FOR HERPES SIMPLEX VIRUS ANTIBODY: Status: ACTIVE | Noted: 2023-06-15

## 2023-09-19 PROCEDURE — 76816 OB US FOLLOW-UP PER FETUS: CPT

## 2023-09-19 PROCEDURE — 76818 FETAL BIOPHYS PROFILE W/NST: CPT | Mod: 59

## 2023-09-19 PROCEDURE — 99211 OFF/OP EST MAY X REQ PHY/QHP: CPT | Mod: 25

## 2023-09-21 PROBLEM — I05.9 ENDOCARDITIS OF MITRAL VALVE: Status: ACTIVE | Noted: 2023-01-02

## 2023-09-22 ENCOUNTER — APPOINTMENT (OUTPATIENT)
Dept: OBGYN | Facility: CLINIC | Age: 33
End: 2023-09-22
Payer: COMMERCIAL

## 2023-09-22 VITALS
DIASTOLIC BLOOD PRESSURE: 65 MMHG | HEIGHT: 63 IN | BODY MASS INDEX: 31.01 KG/M2 | SYSTOLIC BLOOD PRESSURE: 105 MMHG | WEIGHT: 175 LBS | HEART RATE: 83 BPM | TEMPERATURE: 98.6 F

## 2023-09-22 DIAGNOSIS — K21.9 GASTRO-ESOPHAGEAL REFLUX DISEASE W/OUT ESOPHAGITIS: ICD-10-CM

## 2023-09-22 DIAGNOSIS — O09.90 SUPERVISION OF HIGH RISK PREGNANCY, UNSPECIFIED, UNSPECIFIED TRIMESTER: ICD-10-CM

## 2023-09-22 LAB
BILIRUB UR QL STRIP: NORMAL
CLARITY UR: CLEAR
COLLECTION METHOD: NORMAL
GLUCOSE UR-MCNC: NORMAL
HCG UR QL: 1 EU/DL
HGB UR QL STRIP.AUTO: NORMAL
KETONES UR-MCNC: NORMAL
LEUKOCYTE ESTERASE UR QL STRIP: ABNORMAL
NITRITE UR QL STRIP: NORMAL
PH UR STRIP: 7
PROT UR STRIP-MCNC: ABNORMAL
SP GR UR STRIP: 1.02

## 2023-09-22 PROCEDURE — 81003 URINALYSIS AUTO W/O SCOPE: CPT | Mod: NC,QW

## 2023-09-22 PROCEDURE — 0502F SUBSEQUENT PRENATAL CARE: CPT

## 2023-09-23 PROBLEM — O09.90 HIGH RISK PREGNANCY, ANTEPARTUM: Status: ACTIVE | Noted: 2023-03-09

## 2023-09-23 PROBLEM — K21.9 ACID REFLUX: Status: ACTIVE | Noted: 2023-05-08

## 2023-09-25 ENCOUNTER — NON-APPOINTMENT (OUTPATIENT)
Age: 33
End: 2023-09-25

## 2023-09-25 LAB — BACTERIA UR CULT: NORMAL

## 2023-09-26 ENCOUNTER — APPOINTMENT (OUTPATIENT)
Dept: ANTEPARTUM | Facility: CLINIC | Age: 33
End: 2023-09-26

## 2023-09-26 ENCOUNTER — APPOINTMENT (OUTPATIENT)
Dept: OBGYN | Facility: HOSPITAL | Age: 33
End: 2023-09-26

## 2023-09-26 ENCOUNTER — TRANSCRIPTION ENCOUNTER (OUTPATIENT)
Age: 33
End: 2023-09-26

## 2023-09-26 ENCOUNTER — INPATIENT (INPATIENT)
Facility: HOSPITAL | Age: 33
LOS: 2 days | Discharge: HOME | End: 2023-09-29
Attending: OBSTETRICS & GYNECOLOGY | Admitting: OBSTETRICS & GYNECOLOGY
Payer: COMMERCIAL

## 2023-09-26 VITALS — HEART RATE: 88 BPM | DIASTOLIC BLOOD PRESSURE: 75 MMHG | SYSTOLIC BLOOD PRESSURE: 116 MMHG

## 2023-09-26 DIAGNOSIS — Z95.2 PRESENCE OF PROSTHETIC HEART VALVE: Chronic | ICD-10-CM

## 2023-09-26 LAB
ALBUMIN SERPL ELPH-MCNC: 3.8 G/DL — SIGNIFICANT CHANGE UP (ref 3.5–5.2)
ALP SERPL-CCNC: 74 U/L — SIGNIFICANT CHANGE UP (ref 30–115)
ALT FLD-CCNC: 9 U/L — SIGNIFICANT CHANGE UP (ref 0–41)
ANION GAP SERPL CALC-SCNC: 15 MMOL/L — HIGH (ref 7–14)
APPEARANCE UR: ABNORMAL
AST SERPL-CCNC: 22 U/L — SIGNIFICANT CHANGE UP (ref 0–41)
BASOPHILS # BLD AUTO: 0.02 K/UL — SIGNIFICANT CHANGE UP (ref 0–0.2)
BASOPHILS # BLD AUTO: 0.03 K/UL — SIGNIFICANT CHANGE UP (ref 0–0.2)
BASOPHILS NFR BLD AUTO: 0.1 % — SIGNIFICANT CHANGE UP (ref 0–1)
BASOPHILS NFR BLD AUTO: 0.3 % — SIGNIFICANT CHANGE UP (ref 0–1)
BILIRUB SERPL-MCNC: 0.6 MG/DL — SIGNIFICANT CHANGE UP (ref 0.2–1.2)
BILIRUB UR-MCNC: NEGATIVE — SIGNIFICANT CHANGE UP
BUN SERPL-MCNC: 7 MG/DL — LOW (ref 10–20)
CALCIUM SERPL-MCNC: 9.4 MG/DL — SIGNIFICANT CHANGE UP (ref 8.4–10.5)
CHLORIDE SERPL-SCNC: 101 MMOL/L — SIGNIFICANT CHANGE UP (ref 98–110)
CO2 SERPL-SCNC: 20 MMOL/L — SIGNIFICANT CHANGE UP (ref 17–32)
COLOR SPEC: YELLOW — SIGNIFICANT CHANGE UP
CREAT SERPL-MCNC: 0.6 MG/DL — LOW (ref 0.7–1.5)
DIFF PNL FLD: ABNORMAL
EGFR: 121 ML/MIN/1.73M2 — SIGNIFICANT CHANGE UP
EOSINOPHIL # BLD AUTO: 0 K/UL — SIGNIFICANT CHANGE UP (ref 0–0.7)
EOSINOPHIL # BLD AUTO: 0.09 K/UL — SIGNIFICANT CHANGE UP (ref 0–0.7)
EOSINOPHIL NFR BLD AUTO: 0 % — SIGNIFICANT CHANGE UP (ref 0–8)
EOSINOPHIL NFR BLD AUTO: 0.9 % — SIGNIFICANT CHANGE UP (ref 0–8)
GLUCOSE SERPL-MCNC: 87 MG/DL — SIGNIFICANT CHANGE UP (ref 70–99)
GLUCOSE UR QL: NEGATIVE MG/DL — SIGNIFICANT CHANGE UP
HCT VFR BLD CALC: 37.6 % — SIGNIFICANT CHANGE UP (ref 37–47)
HCT VFR BLD CALC: 39.3 % — SIGNIFICANT CHANGE UP (ref 37–47)
HGB BLD-MCNC: 13.2 G/DL — SIGNIFICANT CHANGE UP (ref 12–16)
HGB BLD-MCNC: 13.6 G/DL — SIGNIFICANT CHANGE UP (ref 12–16)
IMM GRANULOCYTES NFR BLD AUTO: 0.4 % — HIGH (ref 0.1–0.3)
IMM GRANULOCYTES NFR BLD AUTO: 0.6 % — HIGH (ref 0.1–0.3)
KETONES UR-MCNC: 40 MG/DL
LEUKOCYTE ESTERASE UR-ACNC: ABNORMAL
LYMPHOCYTES # BLD AUTO: 0.98 K/UL — LOW (ref 1.2–3.4)
LYMPHOCYTES # BLD AUTO: 1.94 K/UL — SIGNIFICANT CHANGE UP (ref 1.2–3.4)
LYMPHOCYTES # BLD AUTO: 19.8 % — LOW (ref 20.5–51.1)
LYMPHOCYTES # BLD AUTO: 5.2 % — LOW (ref 20.5–51.1)
MCHC RBC-ENTMCNC: 31.9 PG — HIGH (ref 27–31)
MCHC RBC-ENTMCNC: 32 PG — HIGH (ref 27–31)
MCHC RBC-ENTMCNC: 34.6 G/DL — SIGNIFICANT CHANGE UP (ref 32–37)
MCHC RBC-ENTMCNC: 35.1 G/DL — SIGNIFICANT CHANGE UP (ref 32–37)
MCV RBC AUTO: 91 FL — SIGNIFICANT CHANGE UP (ref 81–99)
MCV RBC AUTO: 92 FL — SIGNIFICANT CHANGE UP (ref 81–99)
MONOCYTES # BLD AUTO: 0.45 K/UL — SIGNIFICANT CHANGE UP (ref 0.1–0.6)
MONOCYTES # BLD AUTO: 0.55 K/UL — SIGNIFICANT CHANGE UP (ref 0.1–0.6)
MONOCYTES NFR BLD AUTO: 2.4 % — SIGNIFICANT CHANGE UP (ref 1.7–9.3)
MONOCYTES NFR BLD AUTO: 5.6 % — SIGNIFICANT CHANGE UP (ref 1.7–9.3)
NEUTROPHILS # BLD AUTO: 17.16 K/UL — HIGH (ref 1.4–6.5)
NEUTROPHILS # BLD AUTO: 7.14 K/UL — HIGH (ref 1.4–6.5)
NEUTROPHILS NFR BLD AUTO: 73 % — SIGNIFICANT CHANGE UP (ref 42.2–75.2)
NEUTROPHILS NFR BLD AUTO: 91.7 % — HIGH (ref 42.2–75.2)
NITRITE UR-MCNC: NEGATIVE — SIGNIFICANT CHANGE UP
NRBC # BLD: 0 /100 WBCS — SIGNIFICANT CHANGE UP (ref 0–0)
NRBC # BLD: 0 /100 WBCS — SIGNIFICANT CHANGE UP (ref 0–0)
PH UR: 7 — SIGNIFICANT CHANGE UP (ref 5–8)
PLATELET # BLD AUTO: 233 K/UL — SIGNIFICANT CHANGE UP (ref 130–400)
PLATELET # BLD AUTO: 241 K/UL — SIGNIFICANT CHANGE UP (ref 130–400)
PMV BLD: 9.4 FL — SIGNIFICANT CHANGE UP (ref 7.4–10.4)
PMV BLD: 9.5 FL — SIGNIFICANT CHANGE UP (ref 7.4–10.4)
POTASSIUM SERPL-MCNC: 4.6 MMOL/L — SIGNIFICANT CHANGE UP (ref 3.5–5)
POTASSIUM SERPL-SCNC: 4.6 MMOL/L — SIGNIFICANT CHANGE UP (ref 3.5–5)
PRENATAL SYPHILIS TEST: SIGNIFICANT CHANGE UP
PROT SERPL-MCNC: 6 G/DL — SIGNIFICANT CHANGE UP (ref 6–8)
PROT UR-MCNC: SIGNIFICANT CHANGE UP MG/DL
RBC # BLD: 4.13 M/UL — LOW (ref 4.2–5.4)
RBC # BLD: 4.27 M/UL — SIGNIFICANT CHANGE UP (ref 4.2–5.4)
RBC # FLD: 12.8 % — SIGNIFICANT CHANGE UP (ref 11.5–14.5)
RBC # FLD: 13.2 % — SIGNIFICANT CHANGE UP (ref 11.5–14.5)
SODIUM SERPL-SCNC: 136 MMOL/L — SIGNIFICANT CHANGE UP (ref 135–146)
SP GR SPEC: 1.02 — SIGNIFICANT CHANGE UP (ref 1–1.03)
UROBILINOGEN FLD QL: 0.2 MG/DL — SIGNIFICANT CHANGE UP (ref 0.2–1)
WBC # BLD: 18.72 K/UL — HIGH (ref 4.8–10.8)
WBC # BLD: 9.79 K/UL — SIGNIFICANT CHANGE UP (ref 4.8–10.8)
WBC # FLD AUTO: 18.72 K/UL — HIGH (ref 4.8–10.8)
WBC # FLD AUTO: 9.79 K/UL — SIGNIFICANT CHANGE UP (ref 4.8–10.8)

## 2023-09-26 PROCEDURE — 86850 RBC ANTIBODY SCREEN: CPT

## 2023-09-26 PROCEDURE — 86901 BLOOD TYPING SEROLOGIC RH(D): CPT

## 2023-09-26 PROCEDURE — 87086 URINE CULTURE/COLONY COUNT: CPT

## 2023-09-26 PROCEDURE — 36415 COLL VENOUS BLD VENIPUNCTURE: CPT

## 2023-09-26 PROCEDURE — 59510 CESAREAN DELIVERY: CPT | Mod: U9

## 2023-09-26 PROCEDURE — 86592 SYPHILIS TEST NON-TREP QUAL: CPT

## 2023-09-26 PROCEDURE — ZZZZZ: CPT

## 2023-09-26 PROCEDURE — 86900 BLOOD TYPING SEROLOGIC ABO: CPT

## 2023-09-26 PROCEDURE — 85025 COMPLETE CBC W/AUTO DIFF WBC: CPT

## 2023-09-26 PROCEDURE — 80053 COMPREHEN METABOLIC PANEL: CPT

## 2023-09-26 PROCEDURE — 81001 URINALYSIS AUTO W/SCOPE: CPT

## 2023-09-26 RX ORDER — OXYCODONE HYDROCHLORIDE 5 MG/1
5 TABLET ORAL
Refills: 0 | Status: DISCONTINUED | OUTPATIENT
Start: 2023-09-26 | End: 2023-09-29

## 2023-09-26 RX ORDER — INFLUENZA VIRUS VACCINE 15; 15; 15; 15 UG/.5ML; UG/.5ML; UG/.5ML; UG/.5ML
0.5 SUSPENSION INTRAMUSCULAR ONCE
Refills: 0 | Status: DISCONTINUED | OUTPATIENT
Start: 2023-09-26 | End: 2023-09-26

## 2023-09-26 RX ORDER — IBUPROFEN 200 MG
600 TABLET ORAL EVERY 6 HOURS
Refills: 0 | Status: COMPLETED | OUTPATIENT
Start: 2023-09-26 | End: 2024-08-24

## 2023-09-26 RX ORDER — CEFAZOLIN SODIUM 1 G
2000 VIAL (EA) INJECTION ONCE
Refills: 0 | Status: COMPLETED | OUTPATIENT
Start: 2023-09-26 | End: 2023-09-26

## 2023-09-26 RX ORDER — LANOLIN
1 OINTMENT (GRAM) TOPICAL EVERY 6 HOURS
Refills: 0 | Status: DISCONTINUED | OUTPATIENT
Start: 2023-09-26 | End: 2023-09-29

## 2023-09-26 RX ORDER — OXYCODONE HYDROCHLORIDE 5 MG/1
5 TABLET ORAL ONCE
Refills: 0 | Status: DISCONTINUED | OUTPATIENT
Start: 2023-09-26 | End: 2023-09-29

## 2023-09-26 RX ORDER — OXYTOCIN 10 UNIT/ML
333.33 VIAL (ML) INJECTION
Qty: 20 | Refills: 0 | Status: DISCONTINUED | OUTPATIENT
Start: 2023-09-26 | End: 2023-09-29

## 2023-09-26 RX ORDER — MORPHINE SULFATE 50 MG/1
0.2 CAPSULE, EXTENDED RELEASE ORAL ONCE
Refills: 0 | Status: DISCONTINUED | OUTPATIENT
Start: 2023-09-26 | End: 2023-09-26

## 2023-09-26 RX ORDER — TRIAMCINOLONE 4 MG
40 TABLET ORAL ONCE
Refills: 0 | Status: DISCONTINUED | OUTPATIENT
Start: 2023-09-26 | End: 2023-09-26

## 2023-09-26 RX ORDER — ENOXAPARIN SODIUM 100 MG/ML
40 INJECTION SUBCUTANEOUS EVERY 24 HOURS
Refills: 0 | Status: DISCONTINUED | OUTPATIENT
Start: 2023-09-27 | End: 2023-09-29

## 2023-09-26 RX ORDER — NALOXONE HYDROCHLORIDE 4 MG/.1ML
0.1 SPRAY NASAL
Refills: 0 | Status: DISCONTINUED | OUTPATIENT
Start: 2023-09-26 | End: 2023-09-26

## 2023-09-26 RX ORDER — IBUPROFEN 200 MG
600 TABLET ORAL EVERY 6 HOURS
Refills: 0 | Status: DISCONTINUED | OUTPATIENT
Start: 2023-09-26 | End: 2023-09-29

## 2023-09-26 RX ORDER — DIPHENHYDRAMINE HCL 50 MG
25 CAPSULE ORAL EVERY 6 HOURS
Refills: 0 | Status: DISCONTINUED | OUTPATIENT
Start: 2023-09-26 | End: 2023-09-29

## 2023-09-26 RX ORDER — SODIUM CHLORIDE 9 MG/ML
1000 INJECTION, SOLUTION INTRAVENOUS
Refills: 0 | Status: DISCONTINUED | OUTPATIENT
Start: 2023-09-26 | End: 2023-09-26

## 2023-09-26 RX ORDER — ACETAMINOPHEN 500 MG
975 TABLET ORAL
Refills: 0 | Status: DISCONTINUED | OUTPATIENT
Start: 2023-09-26 | End: 2023-09-29

## 2023-09-26 RX ORDER — OXYTOCIN 10 UNIT/ML
333.33 VIAL (ML) INJECTION
Qty: 20 | Refills: 0 | Status: DISCONTINUED | OUTPATIENT
Start: 2023-09-26 | End: 2023-09-26

## 2023-09-26 RX ORDER — FOLIC ACID 0.8 MG
1 TABLET ORAL DAILY
Refills: 0 | Status: DISCONTINUED | OUTPATIENT
Start: 2023-09-26 | End: 2023-09-29

## 2023-09-26 RX ORDER — SODIUM CHLORIDE 9 MG/ML
1000 INJECTION, SOLUTION INTRAVENOUS ONCE
Refills: 0 | Status: DISCONTINUED | OUTPATIENT
Start: 2023-09-26 | End: 2023-09-26

## 2023-09-26 RX ORDER — ONDANSETRON 8 MG/1
4 TABLET, FILM COATED ORAL EVERY 6 HOURS
Refills: 0 | Status: DISCONTINUED | OUTPATIENT
Start: 2023-09-26 | End: 2023-09-26

## 2023-09-26 RX ORDER — MAGNESIUM HYDROXIDE 400 MG/1
30 TABLET, CHEWABLE ORAL
Refills: 0 | Status: DISCONTINUED | OUTPATIENT
Start: 2023-09-26 | End: 2023-09-29

## 2023-09-26 RX ORDER — ACETAMINOPHEN 500 MG
1000 TABLET ORAL EVERY 6 HOURS
Refills: 0 | Status: DISCONTINUED | OUTPATIENT
Start: 2023-09-26 | End: 2023-09-26

## 2023-09-26 RX ORDER — FAMOTIDINE 10 MG/ML
20 INJECTION INTRAVENOUS ONCE
Refills: 0 | Status: DISCONTINUED | OUTPATIENT
Start: 2023-09-26 | End: 2023-09-26

## 2023-09-26 RX ORDER — FERROUS SULFATE 325(65) MG
325 TABLET ORAL DAILY
Refills: 0 | Status: DISCONTINUED | OUTPATIENT
Start: 2023-09-26 | End: 2023-09-29

## 2023-09-26 RX ORDER — SIMETHICONE 80 MG/1
80 TABLET, CHEWABLE ORAL EVERY 4 HOURS
Refills: 0 | Status: DISCONTINUED | OUTPATIENT
Start: 2023-09-26 | End: 2023-09-29

## 2023-09-26 RX ORDER — SODIUM CHLORIDE 9 MG/ML
1000 INJECTION, SOLUTION INTRAVENOUS
Refills: 0 | Status: DISCONTINUED | OUTPATIENT
Start: 2023-09-26 | End: 2023-09-29

## 2023-09-26 RX ORDER — ACETAMINOPHEN 500 MG
975 TABLET ORAL EVERY 6 HOURS
Refills: 0 | Status: DISCONTINUED | OUTPATIENT
Start: 2023-09-26 | End: 2023-09-29

## 2023-09-26 RX ORDER — OXYCODONE HYDROCHLORIDE 5 MG/1
10 TABLET ORAL
Refills: 0 | Status: DISCONTINUED | OUTPATIENT
Start: 2023-09-26 | End: 2023-09-26

## 2023-09-26 RX ORDER — TETANUS TOXOID, REDUCED DIPHTHERIA TOXOID AND ACELLULAR PERTUSSIS VACCINE, ADSORBED 5; 2.5; 8; 8; 2.5 [IU]/.5ML; [IU]/.5ML; UG/.5ML; UG/.5ML; UG/.5ML
0.5 SUSPENSION INTRAMUSCULAR ONCE
Refills: 0 | Status: DISCONTINUED | OUTPATIENT
Start: 2023-09-26 | End: 2023-09-29

## 2023-09-26 RX ORDER — OXYCODONE HYDROCHLORIDE 5 MG/1
5 TABLET ORAL
Refills: 0 | Status: DISCONTINUED | OUTPATIENT
Start: 2023-09-26 | End: 2023-09-26

## 2023-09-26 RX ADMIN — Medication 1 APPLICATION(S): at 22:17

## 2023-09-26 RX ADMIN — Medication 600 MILLIGRAM(S): at 19:21

## 2023-09-26 RX ADMIN — SODIUM CHLORIDE 125 MILLILITER(S): 9 INJECTION, SOLUTION INTRAVENOUS at 23:03

## 2023-09-26 RX ADMIN — Medication 600 MILLIGRAM(S): at 23:41

## 2023-09-26 RX ADMIN — Medication 600 MILLIGRAM(S): at 21:51

## 2023-09-26 RX ADMIN — Medication 1 TABLET(S): at 20:14

## 2023-09-26 RX ADMIN — Medication 975 MILLIGRAM(S): at 21:51

## 2023-09-26 RX ADMIN — Medication 325 MILLIGRAM(S): at 20:13

## 2023-09-26 RX ADMIN — Medication 200 MILLIGRAM(S): at 11:55

## 2023-09-26 RX ADMIN — Medication 975 MILLIGRAM(S): at 20:14

## 2023-09-26 RX ADMIN — Medication 1 MILLIGRAM(S): at 23:49

## 2023-09-26 NOTE — OB PROVIDER H&P - NSICDXPASTMEDICALHX_GEN_ALL_CORE_FT
PAST MEDICAL HISTORY:  H/O bacterial endocarditis     Murmur     VSD (ventricular septal defect)

## 2023-09-26 NOTE — BRIEF OPERATIVE NOTE - OPERATION/FINDINGS
viable male infant, 7lb, APGAR 9/9. Normal uterus and bilateral fallopian tubes. Mild endometriosis lesions noted on right ovary. viable male infant, 7lb, APGAR 9/9. True knot x1 noted in umbilical cord. Normal uterus and bilateral fallopian tubes. Mild endometriosis lesions noted on right ovary. viable male infant, 7lb, APGAR 9/9. True knot x1 noted in umbilical cord. Nuchal card x 1 reduced, Normal uterus and bilateral fallopian tubes. Mild suspected endometriosis lesions noted on bilateral ovaries.

## 2023-09-26 NOTE — PROGRESS NOTE ADULT - SUBJECTIVE AND OBJECTIVE BOX
PGY1 NOTE  Chief Complaint: Postpartum    HPI: Pt doing well, pain well controlled. No events, no acute complaints.   Ambulating: no  Voiding: shelton in place  Diet: regular  Breastfeeding: pumping  Denies HA, lightheadedness, palpitations, N/V, fevers, chills, CP, SOB, LE edema, heavy vaginal bleeding.     PAST MEDICAL & SURGICAL HISTORY:  Murmur    VSD (ventricular septal defect)  H/O bacterial endocarditis  H/O mitral valve replacement    Physical Exam  Vital Signs Last 24 Hrs  T(F): 98.3 (26 Sep 2023 18:27), Max: 98.3 (26 Sep 2023 18:27)  HR: 64 (26 Sep 2023 18:27) (64 - 120)  BP: 126/63 (26 Sep 2023 18:27) (111/65 - 127/74)  RR: 18 (26 Sep 2023 18:27) (18 - 20)    Physical exam:  General - AAOx3, NAD  Heart - S1S2, RRR  Lungs - CTA BL  Abdomen:  - Soft, nontender, nondistended, BS+  - Fundus firm, nontender, below the umbilicus  -Incision c/d/i  Pelvis/Vagina - Normal rubra lochia  Extremities - No calf tenderness, no swelling    UO: 200cc ([id number])    Labs:                        13.6   9.79  )-----------( 241      ( 26 Sep 2023 10:30 )             39.3     ABO RH Interpretation: B POS (09-26-23 @ 10:30)  Antibody Screen: NEG (09-26-23 @ 10:30)    Prenatal Syphilis Test: Nonreact (09-26-23 @ 10:30)      acetaminophen     Tablet .. 975 milliGRAM(s) Oral <User Schedule>, Routine  acetaminophen     Tablet .. 975 milliGRAM(s) Oral every 6 hours, Routine  diphenhydrAMINE 25 milliGRAM(s) Oral every 6 hours, Routine PRN Pruritus  diphtheria/tetanus/pertussis (acellular) Vaccine (Adacel) 0.5 milliLiter(s) IntraMuscular once, Now  ferrous    sulfate 325 milliGRAM(s) Oral daily, Routine  folic acid 1 milliGRAM(s) Oral daily, Routine  ibuprofen  Tablet. 600 milliGRAM(s) Oral every 6 hours, Routine  lactated ringers. 1000 milliLiter(s) (125 mL/Hr) IV Continuous <Continuous>, Routine  lanolin Ointment 1 Application(s) Topical every 6 hours, Routine PRN Sore Nipples  magnesium hydroxide Suspension 30 milliLiter(s) Oral two times a day, Routine PRN Constipation  oxyCODONE    IR 5 milliGRAM(s) Oral every 3 hours, Routine PRN Moderate to Severe Pain (4-10)  oxyCODONE    IR 5 milliGRAM(s) Oral once, Routine PRN Moderate to Severe Pain (4-10)  oxyCODONE    IR 5 milliGRAM(s) Oral every 3 hours, Routine PRN Moderate to Severe Pain (4-10)  oxyCODONE    IR 5 milliGRAM(s) Oral once, Routine PRN Moderate to Severe Pain (4-10)  oxytocin Infusion 333.333 milliUNIT(s)/Min (1000 mL/Hr) IV Continuous <Continuous>, Routine  prenatal multivitamin 1 Tablet(s) Oral daily, Routine  simethicone 80 milliGRAM(s) Chew every 4 hours, Routine PRN Gas

## 2023-09-26 NOTE — OB RN INTRAOPERATIVE NOTE - NSSELHIDDEN_OBGYN_ALL_OB_FT
[NS_[email or web address]_ALL_OB_FT:[id number]],[NS_[email or web address]_ALL_OB_FT:[id number]],[NS_DeliveryRN_OBGYN_ALL_OB_FT:[id number]]

## 2023-09-26 NOTE — OB RN DELIVERY SUMMARY - NSSELHIDDEN_OBGYN_ALL_OB_FT
[NS_[email or web address]_ALL_OB_FT:[id number]],[NS_DeliveryAssist1_OBGYN_ALL_OB_FT:[id number]],[NS_DeliveryRN_OBGYN_ALL_OB_FT:[email or web address]=]

## 2023-09-26 NOTE — CONSULT NOTE ADULT - ASSESSMENT
#Hx of bacterial endocarditis  #Severe MR with flail anterior leaflet s/p bioprosthetic MVR at [facility]    - Pt was on eliquis for 3 months then she was switched to ASA 81mg during her pregnancy    Hemodynamically stable. Clinically not in heart failure  Last TTE 05/05/2023 with bioprosthetic valve in proper position. No paravalvular leak. Small residual VSD  - Continue anti-thrombotic therapy with ASA 81mg for the bioprosthetic valve.  - Outpt follow up with Dr. Conrado Singh   #Hx of bacterial endocarditis  #Severe MR with flail anterior leaflet s/p bioprosthetic MVR at [facility]    - Pt was on eliquis for 3 months then she was switched to ASA 81mg during her pregnancy  #Pregnancy s/p elective  POD#1    Hemodynamically stable. Clinically not in heart failure  Last TTE 2023 with bioprosthetic valve in proper position. No paravalvular leak. Small residual VSD  - Continue anti-thrombotic therapy with ASA 81mg for the bioprosthetic valve.  - Outpt follow up with Dr. Conrado Singh   #Hx of bacterial endocarditis  #Hx of Severe MR with flail anterior leaflet s/p bioprosthetic MVR at [facility]    - Pt was on eliquis for 3 months then stopped and she was on ASA 81mg during her pregnancy  #Pregnancy s/p elective  POD#1 no complication       Hemodynamically stable. Clinically not in heart failure  Last TTE 2023 with bioprosthetic valve in proper position. No paravalvular leak. Small residual VSD  - Continue anti-thrombotic therapy with ASA 81mg for the bioprosthetic valve.  - Outpt follow up

## 2023-09-26 NOTE — OB RN PATIENT PROFILE - GRAVIDA, OB PROFILE
1) Continue general healthful, low sodium diet; although pt obese calorie restriction not appropriate if pt breastfeeding.  Upon cessation of breastfeeding can initiate calorie-restricted diet for weight loss./General/healthful diet
2

## 2023-09-26 NOTE — OB PROVIDER H&P - NSHPLABSRESULTS_GEN_ALL_CORE
, 39w1d, JESSIE 10/2/23, by LMP 22, elective primary c/s for h/o VSD repaired, endocarditis with prosthetic valve replacement,  failed GCT normal GTT, GBS pos.     23  HIV nr  GBS pos    7/3/23  GTT 73, 167, 133, 83    23      3/9/23  HIV nr  HbsAg nr  Hep C nr  rubella immune  HSV 1 antibody pos  measles immune  varicella immune  CMV IgG pos, IgM neg  syphilis neg  B pos  antibody neg    Cardiac imaging (5/3/23):  ECG: sinus rhythm with first degree AV block  TAVO: LVEF 56%, mildly enlarged RV with mildly reduced systolic function, prosthetic valve in proper position with normal function, noted mobile ruptured chordae tendinae prolapsing into LVOT, mild tricuspid regurg, findings similar to intra-op TAVO report from 22    sonograms  38w1d vtx, posterior placenta, mvp 3.63cm, bpp 10/10, efw 3127 (37%ile)  36w2d vtx, posterior placenta, coleen wnl, MVP 7.11, bpp 10/10  34w1d vtx, posterior placenta, mvp 4.99, bpp 10/10, efw 34%ile  20w0d vtx, posterior placenta, coleen wnl, mvp 5.35, cervix length 3cm, anatomy wnl  10w3d SIUP, size c/w LMP    23  Fetal echo normal

## 2023-09-26 NOTE — CONSULT NOTE ADULT - SUBJECTIVE AND OBJECTIVE BOX
Outpt cardiologist:    HPI:  32yo  at 39w1d by LMP c/w first trimester sonogram, here for elective primary c/s. Pregnancy c/b h/o acute bacterial endocarditis and mitral valve replacement in . Today denies contractions, vaginal bleeding, leakage of fluid, endorses good fetal movement. GBS positive.     #H/o endocarditis/mitral valve regurgitation/VSD  - h/o uncorrected congenital VSD, asymptomatic  - in  dx with bacterial endocarditis and mitral valve regurgitation  - lead to acute HF necessitating mitral valve replacement  - completed treatment with abx for endocarditis, subsequent negative cultures   - no sxs during pregnancy, TAVO showed normal EF of 56%  - on aspirin 81mg for entire pregnancy   (26 Sep 2023 10:51)    Cardiology fellow notes:   Ms Verma is a 32 year old female patient with PMHx of congenital VSD, hospitalized last year Oct, initially for cough SOB and hemoptysis, diagnosed with endocarditis complicated by severe MR and heart failure. Treated with IV ABX and transferred to [facility] for surgical intervention. on 22 she underwent successful mitral valve vegetation resection and replacement with 31 mm tissue epic valve. VSD closed primarily.    She later got pregnant, eliquis was stopped (she already received for 3 months), and she was switched to ASA  She is here for an elective . She is POD#1    PAST MEDICAL & SURGICAL HISTORY  Murmur    VSD (ventricular septal defect)    H/O bacterial endocarditis    H/O mitral valve replacement        FAMILY HISTORY:  FAMILY HISTORY:  No pertinent family history in first degree relatives        SOCIAL HISTORY:  Social History:  denies tobacco/alcohol/drugs (26 Sep 2023 10:51)      ALLERGIES:  No Known Allergies      MEDICATIONS:  acetaminophen     Tablet .. 975 milliGRAM(s) Oral <User Schedule>  acetaminophen     Tablet .. 975 milliGRAM(s) Oral every 6 hours  diphtheria/tetanus/pertussis (acellular) Vaccine (Adacel) 0.5 milliLiter(s) IntraMuscular once  ferrous    sulfate 325 milliGRAM(s) Oral daily  folic acid 1 milliGRAM(s) Oral daily  ibuprofen  Tablet. 600 milliGRAM(s) Oral every 6 hours  lactated ringers. 1000 milliLiter(s) (125 mL/Hr) IV Continuous <Continuous>  oxytocin Infusion 333.333 milliUNIT(s)/Min (1000 mL/Hr) IV Continuous <Continuous>  prenatal multivitamin 1 Tablet(s) Oral daily    PRN:  diphenhydrAMINE 25 milliGRAM(s) Oral every 6 hours PRN  lanolin Ointment 1 Application(s) Topical every 6 hours PRN  magnesium hydroxide Suspension 30 milliLiter(s) Oral two times a day PRN  oxyCODONE    IR 5 milliGRAM(s) Oral every 3 hours PRN  oxyCODONE    IR 5 milliGRAM(s) Oral once PRN  oxyCODONE    IR 5 milliGRAM(s) Oral every 3 hours PRN  oxyCODONE    IR 5 milliGRAM(s) Oral once PRN  simethicone 80 milliGRAM(s) Chew every 4 hours PRN      HOME MEDICATIONS:  Home Medications:  cefTRIAXone: 2000 milligram(s) intravenous once a day (2022 09:00)  gentamicin: 180 milligram(s) intravenous once a day (2022 09:53)      VITALS:   T(F): 98.3 ( @ 18:27), Max: 98.3 ( @ 18:27)  HR: 64 ( @ 18:27) (64 - 120)  BP: 126/63 ( @ 18:27) (111/65 - 127/74)  BP(mean): --  RR: 18 ( @ 18:27) (18 - 20)  SpO2: 98% ( @ 18:01) (93% - 100%)    I&O's Summary    26 Sep 2023 07:01  -  26 Sep 2023 21:26  --------------------------------------------------------  IN: 0 mL / OUT: 145 mL / NET: -145 mL        REVIEW OF SYSTEMS:  CONSTITUTIONAL: No weakness, fevers or chills  HEENT: No visual changes, neck/ear pain  RESPIRATORY: No cough, sob  CARDIOVASCULAR: See HPI  GASTROINTESTINAL: No abdominal pain. No nausea, vomiting, diarrhea   GENITOURINARY: No dysuria, frequency or hematuria  NEUROLOGICAL: No new focal deficits  SKIN: No new rashes    PHYSICAL EXAM:  General: Not in distress.  Non-toxic appearing.   HEENT: EOMI  Cardio: regular, S1, S2, no murmur  Pulm: B/L BS.  No wheezing / crackles / rales  Abdomen: Soft, non-tender, non-distended. Normoactive bowel sounds  Extremities: No edema b/l le  Neuro: A&O x3. No focal deficits    LABS:                        13.6   9.79  )-----------( 241      ( 26 Sep 2023 10:30 )             39.3                     Troponin trend:            RADIOLOGY:  -CXR:  -TTE:  < from: TTE Echo Complete w/o Contrast w/ Doppler (23 @ 10:42) >  Summary:   1. Normal global left ventricular systolic function.   2. LV Ejection Fraction by Pop's Method with a biplane EF of 56 %.   3. Mildly enlarged right ventricle. RV systolic function is mildly   reduced.   4. Bioprosthetic MVR (Epic 31mm) in proper position with normal   function. MG is 5 mmHg at HR 85bpm, which is normal for this valve. No   evidence of valvular or paravalvular regurgitation. There are mobile   ruptured chordae tendineae visualized prolapsing into the LVOT.   5. Mild tricuspid regurgitation.   6. Small VSD on color flow doppler visualized. Patient has known   residual VSD following VSD closure.   7. Compared to the intra-operative TAVO report from 22, the   findings are similar.    < end of copied text >    -CCTA:  -STRESS TEST:  -CATHETERIZATION:  -OTHER:  ECG:      TELEMETRY EVENTS:

## 2023-09-26 NOTE — PROGRESS NOTE ADULT - ASSESSMENT
33y now P1, s/p primary LTCS, [id number], h/o acute endocarditis and mitral valve replacement, recovering well.  - pain management with PO pain meds   - monitor vitals/bleeding   - encourage incentive spirometry   - ambulation/PO hydration  - advance diet as tolerated   - SCDs/lovenox for DVT prophylaxis   - f/u AM labs   - routine postop care       Dr. Singh and Dr. Mckeon to be made aware   33y now P1, s/p primary LTCS, [id number], h/o acute endocarditis and mitral valve replacement, recovering well.  - pain management with PO pain meds   - monitor vitals/bleeding   - encourage incentive spirometry   - ambulation/PO hydration  - advance diet as tolerated   - SCDs/lovenox for DVT prophylaxis   - f/u AM labs   - routine postop care       Dr. Singh and Dr. Mckeon to be made aware.

## 2023-09-26 NOTE — OB RN PATIENT PROFILE - BREASTFEEDING PROVIDES STABLE TEMPERATURE THROUGH SKIN TO SKIN CONTACT
Routing refill request to provider for review/approval because:  Shweta given x1 and patient did not follow up, please advise         Statement Selected

## 2023-09-26 NOTE — OB PROVIDER H&P - HISTORY OF PRESENT ILLNESS
32yo  at 39w1d by LMP c/w first trimester sonogram, here for elective primary c/s due to h/o endocarditis and mitral valve replacement in  complicating this pregnancy. Today denies contractions, vaginal bleeding, leakage of fluid, endorses good fetal movement. GBS positive.     #H/o endocarditis/mitral valve regurgitation/VSD  - h/o uncorrected congenital VSD, asymptomatic  - in  dx with bacterial endocarditis and mitral valve regurgitation  - lead to acute HF necessitating mitral valve replacement  - completed treatment with abx for endocarditis, subsequent negative cultures   - no sxs during pregnancy, TAVO showed normal EF of 56%  - on aspirin 81mg for entire pregnancy   34yo  at 39w1d by LMP c/w first trimester sonogram, here for elective primary c/s. Pregnancy c/b h/o acute bacterial endocarditis and mitral valve replacement in . Today denies contractions, vaginal bleeding, leakage of fluid, endorses good fetal movement. GBS positive.     #H/o endocarditis/mitral valve regurgitation/VSD  - h/o uncorrected congenital VSD, asymptomatic  - in  dx with bacterial endocarditis and mitral valve regurgitation  - lead to acute HF necessitating mitral valve replacement  - completed treatment with abx for endocarditis, subsequent negative cultures   - no sxs during pregnancy, TAVO showed normal EF of 56%  - on aspirin 81mg for entire pregnancy

## 2023-09-26 NOTE — OB PROVIDER H&P - ATTENDING COMMENTS
32yo  @ 39 1/7wks came for scheduled elective P C/S.  She denies VB, LOF, CTXs and states +FM.  She has had premature cervical dilation for the last several weeks.  She has h/o open heart surgery with valve replacement, endocarditis treated last year with negative cultures after.  She has had close cardiac follow up during pregnancy as well as [facility] multi-disciplinary team monitoring her pregnancy. She has no other significant comorbidities.  She was dx with GERD during pregnancy controlled with protonix.  She had abnl GCT with normal GTT.  She has been counseled on multiple occasions R/B/S of mode of delivery and she would like C/S.  The R/B/A were discussed including, but not limited to, hemorrhage, blood transfusion, hysterectomy, bowel/bladder/organ injury and repair, need for future procedures and readmission and infection.      Patient understood all counseling, all questions answered satisfactorily and patient signed informed, witnessed consent.  ID was called regarding abx prophylaxis for the surgery due to her prosthetic valve and they stated that the routine ancef should be sufficient due to her tx last year and negative and asx since.  Will discuss with cardiology after case if need for anticoagulation for home is recommended.  Patient was counseled on all mgmt plans and is in agreement and wishes to proceed with planned procedure.     SVE: 3/80/-3  FHT: 130s, moderate variability, + accelerations, no decelerations, irregular CTXs.    A: 32yo  @ 39+ wks for P C/S, prosthetic heart valve    P: admit      admission labs        IV hydration        anesthesia consulted        continuous fetal monitoring        prep/shave        IV ancef        bicitra        shelton to be placed in OR        venodyne compression in OR        on call to OR

## 2023-09-26 NOTE — OB RN PATIENT PROFILE - FALL HARM RISK - UNIVERSAL INTERVENTIONS
Bed in lowest position, wheels locked, appropriate side rails in place/Call bell, personal items and telephone in reach/Instruct patient to call for assistance before getting out of bed or chair/Non-slip footwear when patient is out of bed/[place] to call system/Physically safe environment - no spills, clutter or unnecessary equipment/Purposeful Proactive Rounding/Room/bathroom lighting operational, light cord in reach

## 2023-09-26 NOTE — OB PROVIDER DELIVERY SUMMARY - NSSELHIDDEN_OBGYN_ALL_OB_FT
[NS_DeliveryAttending1_OBGYN_ALL_OB_FT:[id number]],[NS_DeliveryAssist1_OBGYN_ALL_OB_FT:[id number]],[NS_DeliveryRN_OBGYN_ALL_OB_FT:[email or web address]=]

## 2023-09-26 NOTE — BRIEF OPERATIVE NOTE - COMMENTS
LTCS done, uterus closed in 2 layers, interceed adhesion prophylaxis, all layers closed, subcuticular skin closure covered with dermabond

## 2023-09-26 NOTE — OB PROVIDER H&P - NSLOWPPHRISK_OBGYN_A_OB
No previous uterine incision/Jorge Pregnancy/Less than or equal to 4 previous vaginal births/No known bleeding disorder/No history of postpartum hemorrhage

## 2023-09-26 NOTE — OB PROVIDER H&P - ASSESSMENT
32yo  at 39w1d, GBS pos, here for elective c/s for h/o acute endocarditis and mitral valve replacement.     -Admit to L&D  -Admission labs  -Monitor vitals  -Cont EFM/[place]  -Pain management prn  -Clear liquid diet  -pre-op antibiotics  -anesthesia consult    Dr. Fam and Dr. Singh aware.   34yo  at 39w1d, GBS pos, h/o acute endocarditis and mitral valve replacement, here for primary elective c/s.     -Admit to L&D  -Admission labs  -Monitor vitals  -Cont EFM/[place]  -Pain management prn  -Clear liquid diet  -pre-op antibiotics  -anesthesia consult    Dr. Fam and Dr. Singh aware.

## 2023-09-26 NOTE — OB RN PATIENT PROFILE - WEIGHT: TOTAL WEIGHT IN LBS
I have personally performed a face to face diagnostic evaluation on this patient. I have reviewed the ACP note and agree with the history, exam and plan of care, except as noted. 31

## 2023-09-26 NOTE — OB PROVIDER H&P - NSHPPHYSICALEXAM_GEN_ALL_CORE
Vital Signs Last 24 Hrs  T(C): 36.7 (26 Sep 2023 10:51), Max: 36.7 (26 Sep 2023 10:00)  T(F): 98 (26 Sep 2023 10:00), Max: 98 (26 Sep 2023 10:00)  HR: 88 (26 Sep 2023 10:51) (88 - 88)  BP: 116/75 (26 Sep 2023 10:51) (116/75 - 116/75)  RR: 20 (26 Sep 2023 10:51) (20 - 20)    Gen: aaox3  Abd: soft, gravid, nontender, no palpable ctx  EFM: 135/mod/pos accel  [place]: irregular  Sono: cephalic, posterior placenta

## 2023-09-26 NOTE — OB RN PATIENT PROFILE - PROVIDER NOTIFICATION
Called patient, writer discussed with patient we cannot admit her a day early, it would have to come through her PCP. Patient stated she was going to take a van for medical transportation, but now didn't think she would be able to get in the van and would take an ambulance. Writer informed patient she didn't think she could take an ambulance for transportation and recommended the patient check before cancelling the medical transportation service tomorrow. Patient stated her legs gave out on her yesterday and she almost fell, but her  caught her. Patient will keep medical transportation service for tomorrow. Patient stated her legs are still swollen, writer recommended elevating her legs. Patient stated she has difficulty with this, but will try. Patient verbalized understanding.  
Pt is calling to request a return phone call from a nurse. Pt reports she is scheduled for surgery tomorrow with Dr. Davies. Pt reports she is off her blood pressure medication and her \"water pill\" has been changed. Pt reports she is having so much trouble getting around that she wants to be admitted to the hospital a day before her surgery. Please return phone call to patient.   
Declines

## 2023-09-27 LAB
ALBUMIN SERPL ELPH-MCNC: 3.7 G/DL — SIGNIFICANT CHANGE UP (ref 3.5–5.2)
ALP SERPL-CCNC: 70 U/L — SIGNIFICANT CHANGE UP (ref 30–115)
ALT FLD-CCNC: 9 U/L — SIGNIFICANT CHANGE UP (ref 0–41)
ANION GAP SERPL CALC-SCNC: 13 MMOL/L — SIGNIFICANT CHANGE UP (ref 7–14)
AST SERPL-CCNC: 22 U/L — SIGNIFICANT CHANGE UP (ref 0–41)
BASOPHILS # BLD AUTO: 0.02 K/UL — SIGNIFICANT CHANGE UP (ref 0–0.2)
BASOPHILS NFR BLD AUTO: 0.1 % — SIGNIFICANT CHANGE UP (ref 0–1)
BILIRUB SERPL-MCNC: 0.5 MG/DL — SIGNIFICANT CHANGE UP (ref 0.2–1.2)
BUN SERPL-MCNC: 5 MG/DL — LOW (ref 10–20)
CALCIUM SERPL-MCNC: 9.8 MG/DL — SIGNIFICANT CHANGE UP (ref 8.4–10.5)
CHLORIDE SERPL-SCNC: 100 MMOL/L — SIGNIFICANT CHANGE UP (ref 98–110)
CO2 SERPL-SCNC: 24 MMOL/L — SIGNIFICANT CHANGE UP (ref 17–32)
CREAT SERPL-MCNC: 0.6 MG/DL — LOW (ref 0.7–1.5)
CULTURE RESULTS: NO GROWTH — SIGNIFICANT CHANGE UP
EGFR: 121 ML/MIN/1.73M2 — SIGNIFICANT CHANGE UP
EOSINOPHIL # BLD AUTO: 0 K/UL — SIGNIFICANT CHANGE UP (ref 0–0.7)
EOSINOPHIL NFR BLD AUTO: 0 % — SIGNIFICANT CHANGE UP (ref 0–8)
GLUCOSE SERPL-MCNC: 95 MG/DL — SIGNIFICANT CHANGE UP (ref 70–99)
HCT VFR BLD CALC: 32.8 % — LOW (ref 37–47)
HGB BLD-MCNC: 11.5 G/DL — LOW (ref 12–16)
IMM GRANULOCYTES NFR BLD AUTO: 0.5 % — HIGH (ref 0.1–0.3)
LYMPHOCYTES # BLD AUTO: 1.84 K/UL — SIGNIFICANT CHANGE UP (ref 1.2–3.4)
LYMPHOCYTES # BLD AUTO: 10.7 % — LOW (ref 20.5–51.1)
MCHC RBC-ENTMCNC: 31.6 PG — HIGH (ref 27–31)
MCHC RBC-ENTMCNC: 35.1 G/DL — SIGNIFICANT CHANGE UP (ref 32–37)
MCV RBC AUTO: 90.1 FL — SIGNIFICANT CHANGE UP (ref 81–99)
MONOCYTES # BLD AUTO: 0.92 K/UL — HIGH (ref 0.1–0.6)
MONOCYTES NFR BLD AUTO: 5.3 % — SIGNIFICANT CHANGE UP (ref 1.7–9.3)
NEUTROPHILS # BLD AUTO: 14.38 K/UL — HIGH (ref 1.4–6.5)
NEUTROPHILS NFR BLD AUTO: 83.4 % — HIGH (ref 42.2–75.2)
NRBC # BLD: 0 /100 WBCS — SIGNIFICANT CHANGE UP (ref 0–0)
PLATELET # BLD AUTO: 245 K/UL — SIGNIFICANT CHANGE UP (ref 130–400)
PMV BLD: 9.6 FL — SIGNIFICANT CHANGE UP (ref 7.4–10.4)
POTASSIUM SERPL-MCNC: 4.4 MMOL/L — SIGNIFICANT CHANGE UP (ref 3.5–5)
POTASSIUM SERPL-SCNC: 4.4 MMOL/L — SIGNIFICANT CHANGE UP (ref 3.5–5)
PROT SERPL-MCNC: 5.6 G/DL — LOW (ref 6–8)
RBC # BLD: 3.64 M/UL — LOW (ref 4.2–5.4)
RBC # FLD: 12.8 % — SIGNIFICANT CHANGE UP (ref 11.5–14.5)
SODIUM SERPL-SCNC: 137 MMOL/L — SIGNIFICANT CHANGE UP (ref 135–146)
SPECIMEN SOURCE: SIGNIFICANT CHANGE UP
WBC # BLD: 17.24 K/UL — HIGH (ref 4.8–10.8)
WBC # FLD AUTO: 17.24 K/UL — HIGH (ref 4.8–10.8)

## 2023-09-27 PROCEDURE — 99221 1ST HOSP IP/OBS SF/LOW 40: CPT

## 2023-09-27 RX ORDER — SODIUM CHLORIDE 9 MG/ML
500 INJECTION, SOLUTION INTRAVENOUS ONCE
Refills: 0 | Status: DISCONTINUED | OUTPATIENT
Start: 2023-09-27 | End: 2023-09-27

## 2023-09-27 RX ADMIN — Medication 1 TABLET(S): at 11:21

## 2023-09-27 RX ADMIN — Medication 600 MILLIGRAM(S): at 00:11

## 2023-09-27 RX ADMIN — Medication 600 MILLIGRAM(S): at 07:04

## 2023-09-27 RX ADMIN — Medication 975 MILLIGRAM(S): at 04:19

## 2023-09-27 RX ADMIN — SIMETHICONE 80 MILLIGRAM(S): 80 TABLET, CHEWABLE ORAL at 06:35

## 2023-09-27 RX ADMIN — Medication 600 MILLIGRAM(S): at 06:34

## 2023-09-27 RX ADMIN — Medication 325 MILLIGRAM(S): at 11:21

## 2023-09-27 RX ADMIN — ENOXAPARIN SODIUM 40 MILLIGRAM(S): 100 INJECTION SUBCUTANEOUS at 03:49

## 2023-09-27 RX ADMIN — Medication 600 MILLIGRAM(S): at 11:22

## 2023-09-27 RX ADMIN — Medication 975 MILLIGRAM(S): at 20:51

## 2023-09-27 RX ADMIN — SIMETHICONE 80 MILLIGRAM(S): 80 TABLET, CHEWABLE ORAL at 20:51

## 2023-09-27 RX ADMIN — Medication 975 MILLIGRAM(S): at 21:21

## 2023-09-27 RX ADMIN — Medication 1 MILLIGRAM(S): at 11:21

## 2023-09-27 RX ADMIN — Medication 600 MILLIGRAM(S): at 17:09

## 2023-09-27 RX ADMIN — Medication 975 MILLIGRAM(S): at 03:49

## 2023-09-27 NOTE — PROGRESS NOTE ADULT - ASSESSMENT
A/P: 33y P s/p primary elective LTCS, h/o endocarditis and valve replacement, EBL 800cc, POD1, recovering well.    -ambulation encouraged  -PO hydration encouraged  -regular diet  -lovenox ordered for DVT prophylaxis  -Incentive Spirometry encouraged  -pain management per routine  -UO adequate, shelton in until AM  -f/u AM CBC     Dr. Singh and Dr. Fam to be made aware   A/P: 33y P s/p primary elective LTCS, h/o endocarditis and valve replacement, EBL 800cc, POD1, recovering well.    -ambulation encouraged  -PO hydration encouraged  -regular diet  -lovenox ordered for DVT prophylaxis  -Incentive Spirometry encouraged  -pain management per routine  -UO adequate, shelton in until AM      Dr. Singh and Dr. Fam to be made aware

## 2023-09-27 NOTE — PROGRESS NOTE ADULT - SUBJECTIVE AND OBJECTIVE BOX
ELBA TO  33y  Female    PGY1 Note:    Patient seen and examined bedside. Reports unable to hold down food or drink yesterday d/t vomiting, this morning is able to drink water and juice. Johnson in place, UO adequate.  Denies heavy vaginal bleeding. Pain well controlled. Denies headaches, vision changes, dizziness, lightheadedness, CP, SOB, palpitations, RUQ pain. Denies fever, chills, nausea. Tolerating po fluids, passing flatus, no BM. Breastfeeding.     Physical Exam  Vital Signs Last 24 Hrs  T(C): 36.8 (27 Sep 2023 03:45), Max: 36.8 (26 Sep 2023 18:27)  T(F): 98.3 (27 Sep 2023 03:45), Max: 98.3 (26 Sep 2023 18:27)  HR: 66 (27 Sep 2023 03:45) (64 - 120)  BP: 111/59 (27 Sep 2023 03:45) (111/59 - 127/74)  RR: 18 (27 Sep 2023 03:45) (18 - 20)  SpO2: 98% (26 Sep 2023 18:01) (93% - 100%)        Gen: NAD, sitting comfortably  CV: RRR. No murmurs gallops or rubs.  Pulm: CTAB. No wheezes or rales.  Ext: No calf tenderness, no swelling.   Abd: Nondistended, soft, nontender, BS+, fundus firm, and below umbilicus.   Lochia: Minimal rubra  Wound: Dressing changed. Pfannenstiel skin incision clean, dry intact. Steris in place. No surrounding edema or erythema.        PAST MEDICAL & SURGICAL HISTORY:  Murmur  VSD (ventricular septal defect)  H/O bacterial endocarditis  H/O mitral valve replacement          Diet: advance as tolerated    Labs:                        13.2   18.72 )-----------( 233      ( 26 Sep 2023 21:13 )             37.6                         13.6   9.79  )-----------( 241      ( 26 Sep 2023 10:30 )             39.3          acetaminophen     Tablet .. 975 milliGRAM(s) Oral <User Schedule>  acetaminophen     Tablet .. 975 milliGRAM(s) Oral every 6 hours  diphenhydrAMINE 25 milliGRAM(s) Oral every 6 hours PRN  diphtheria/tetanus/pertussis (acellular) Vaccine (Adacel) 0.5 milliLiter(s) IntraMuscular once  enoxaparin Injectable 40 milliGRAM(s) SubCutaneous every 24 hours  ferrous    sulfate 325 milliGRAM(s) Oral daily  folic acid 1 milliGRAM(s) Oral daily  ibuprofen  Tablet. 600 milliGRAM(s) Oral every 6 hours  lactated ringers. 1000 milliLiter(s) IV Continuous <Continuous>  lanolin Ointment 1 Application(s) Topical every 6 hours PRN  magnesium hydroxide Suspension 30 milliLiter(s) Oral two times a day PRN  oxyCODONE    IR 5 milliGRAM(s) Oral every 3 hours PRN  oxyCODONE    IR 5 milliGRAM(s) Oral once PRN  oxyCODONE    IR 5 milliGRAM(s) Oral every 3 hours PRN  oxyCODONE    IR 5 milliGRAM(s) Oral once PRN  oxytocin Infusion 333.333 milliUNIT(s)/Min IV Continuous <Continuous>  prenatal multivitamin 1 Tablet(s) Oral daily  simethicone 80 milliGRAM(s) Chew every 4 hours PRN          ELBA TO  33y  Female    PGY1 Note:    Patient seen and examined bedside. Reports unable to hold down food or drink yesterday d/t vomiting, this morning is able to drink water and juice. Johnson in place, UO adequate.  Denies heavy vaginal bleeding. Pain well controlled. Denies headaches, vision changes, dizziness, lightheadedness, CP, SOB, palpitations, RUQ pain. Denies fever, chills, nausea. Tolerating po fluids, passing flatus, no BM. Breastfeeding.     Physical Exam  Vital Signs Last 24 Hrs  T(C): 36.8 (27 Sep 2023 03:45), Max: 36.8 (26 Sep 2023 18:27)  T(F): 98.3 (27 Sep 2023 03:45), Max: 98.3 (26 Sep 2023 18:27)  HR: 66 (27 Sep 2023 03:45) (64 - 120)  BP: 111/59 (27 Sep 2023 03:45) (111/59 - 127/74)  RR: 18 (27 Sep 2023 03:45) (18 - 20)  SpO2: 98% (26 Sep 2023 18:01) (93% - 100%)        Gen: NAD, sitting comfortably  CV: RRR. No murmurs gallops or rubs.  Pulm: CTAB. No wheezes or rales.  Ext: No calf tenderness, no swelling.   Abd: Nondistended, soft, nontender, BS+, fundus firm, and below umbilicus.   Lochia: Minimal rubra  Wound:  Pfannenstiel skin incision clean, dry intact. Dermabond in place. No surrounding edema or erythema.    PAST MEDICAL & SURGICAL HISTORY:  Murmur  VSD (ventricular septal defect)  H/O bacterial endocarditis  H/O mitral valve replacement      Diet: advance as tolerated    Labs:                        13.2   18.72 )-----------( 233      ( 26 Sep 2023 21:13 )             37.6                         13.6   9.79  )-----------( 241      ( 26 Sep 2023 10:30 )             39.3          acetaminophen     Tablet .. 975 milliGRAM(s) Oral <User Schedule>  acetaminophen     Tablet .. 975 milliGRAM(s) Oral every 6 hours  diphenhydrAMINE 25 milliGRAM(s) Oral every 6 hours PRN  diphtheria/tetanus/pertussis (acellular) Vaccine (Adacel) 0.5 milliLiter(s) IntraMuscular once  enoxaparin Injectable 40 milliGRAM(s) SubCutaneous every 24 hours  ferrous    sulfate 325 milliGRAM(s) Oral daily  folic acid 1 milliGRAM(s) Oral daily  ibuprofen  Tablet. 600 milliGRAM(s) Oral every 6 hours  lactated ringers. 1000 milliLiter(s) IV Continuous <Continuous>  lanolin Ointment 1 Application(s) Topical every 6 hours PRN  magnesium hydroxide Suspension 30 milliLiter(s) Oral two times a day PRN  oxyCODONE    IR 5 milliGRAM(s) Oral every 3 hours PRN  oxyCODONE    IR 5 milliGRAM(s) Oral once PRN  oxyCODONE    IR 5 milliGRAM(s) Oral every 3 hours PRN  oxyCODONE    IR 5 milliGRAM(s) Oral once PRN  oxytocin Infusion 333.333 milliUNIT(s)/Min IV Continuous <Continuous>  prenatal multivitamin 1 Tablet(s) Oral daily  simethicone 80 milliGRAM(s) Chew every 4 hours PRN

## 2023-09-28 ENCOUNTER — TRANSCRIPTION ENCOUNTER (OUTPATIENT)
Age: 33
End: 2023-09-28

## 2023-09-28 RX ORDER — ASPIRIN/CALCIUM CARB/MAGNESIUM 324 MG
1 TABLET ORAL
Qty: 30 | Refills: 0
Start: 2023-09-28 | End: 2023-10-27

## 2023-09-28 RX ORDER — SIMETHICONE 80 MG/1
1 TABLET, CHEWABLE ORAL
Qty: 0 | Refills: 0 | DISCHARGE
Start: 2023-09-28

## 2023-09-28 RX ORDER — FOLIC ACID 0.8 MG
1 TABLET ORAL
Qty: 0 | Refills: 0 | DISCHARGE
Start: 2023-09-28

## 2023-09-28 RX ORDER — IBUPROFEN 200 MG
1 TABLET ORAL
Qty: 0 | Refills: 0 | DISCHARGE
Start: 2023-09-28

## 2023-09-28 RX ORDER — GENTAMICIN SULFATE 40 MG/ML
180 VIAL (ML) INJECTION
Qty: 0 | Refills: 0 | DISCHARGE

## 2023-09-28 RX ORDER — ACETAMINOPHEN 500 MG
3 TABLET ORAL
Qty: 0 | Refills: 0 | DISCHARGE
Start: 2023-09-28

## 2023-09-28 RX ORDER — ASPIRIN/CALCIUM CARB/MAGNESIUM 324 MG
81 TABLET ORAL DAILY
Refills: 0 | Status: DISCONTINUED | OUTPATIENT
Start: 2023-09-28 | End: 2023-09-29

## 2023-09-28 RX ADMIN — Medication 1 MILLIGRAM(S): at 12:46

## 2023-09-28 RX ADMIN — Medication 975 MILLIGRAM(S): at 16:00

## 2023-09-28 RX ADMIN — Medication 600 MILLIGRAM(S): at 12:46

## 2023-09-28 RX ADMIN — Medication 600 MILLIGRAM(S): at 15:19

## 2023-09-28 RX ADMIN — Medication 975 MILLIGRAM(S): at 21:15

## 2023-09-28 RX ADMIN — Medication 600 MILLIGRAM(S): at 00:17

## 2023-09-28 RX ADMIN — Medication 975 MILLIGRAM(S): at 15:22

## 2023-09-28 RX ADMIN — Medication 600 MILLIGRAM(S): at 07:06

## 2023-09-28 RX ADMIN — Medication 975 MILLIGRAM(S): at 09:30

## 2023-09-28 RX ADMIN — Medication 1 TABLET(S): at 12:46

## 2023-09-28 RX ADMIN — Medication 975 MILLIGRAM(S): at 03:14

## 2023-09-28 RX ADMIN — ENOXAPARIN SODIUM 40 MILLIGRAM(S): 100 INJECTION SUBCUTANEOUS at 02:46

## 2023-09-28 RX ADMIN — Medication 600 MILLIGRAM(S): at 00:47

## 2023-09-28 RX ADMIN — Medication 325 MILLIGRAM(S): at 12:46

## 2023-09-28 RX ADMIN — Medication 600 MILLIGRAM(S): at 06:36

## 2023-09-28 RX ADMIN — Medication 975 MILLIGRAM(S): at 09:11

## 2023-09-28 RX ADMIN — Medication 81 MILLIGRAM(S): at 12:46

## 2023-09-28 RX ADMIN — Medication 975 MILLIGRAM(S): at 02:44

## 2023-09-28 RX ADMIN — Medication 600 MILLIGRAM(S): at 23:41

## 2023-09-28 RX ADMIN — Medication 600 MILLIGRAM(S): at 17:23

## 2023-09-28 NOTE — DISCHARGE NOTE OB - MEDICATION SUMMARY - MEDICATIONS TO TAKE
I will START or STAY ON the medications listed below when I get home from the hospital:    ibuprofen 600 mg oral tablet  -- 1 tab(s) by mouth every 6 hours  -- Indication: For pain    acetaminophen 325 mg oral tablet  -- 3 tab(s) by mouth every 6 hours  -- Indication: For pain    aspirin 81 mg oral delayed release tablet  -- 1 tab(s) by mouth once a day  -- Indication: For prevent blood clot    Prenatal Multivitamins with Folic Acid 1 mg oral tablet  -- 1 tab(s) by mouth once a day  -- Indication: For Healthy mom     senna leaf extract oral tablet  -- 2 tab(s) by mouth once a day (at bedtime)  -- Indication: For constipation    simethicone 80 mg oral tablet, chewable  -- 1 tab(s) by mouth every 4 hours As needed Gas  -- Indication: For gas pain    folic acid 1 mg oral tablet  -- 1 tab(s) by mouth once a day  -- Indication: For Healthy mom

## 2023-09-28 NOTE — DISCHARGE NOTE OB - PATIENT PORTAL LINK FT
You can access the FollowMyHealth Patient Portal offered by [facility] by registering at the following website: [email or web address]. By joining [facility]’s FollowMyHealth portal, you will also be able to view your health information using other applications (apps) compatible with our system.

## 2023-09-28 NOTE — DISCHARGE NOTE OB - MEDICATION SUMMARY - MEDICATIONS TO STOP TAKING
I will STOP taking the medications listed below when I get home from the hospital:    gentamicin  -- 180 milligram(s) intravenous once a day    cefTRIAXone  -- 2000 milligram(s) intravenous once a day    Pepcid 20 mg oral tablet  -- 1 tab(s) by mouth once a day    aspirin 81 mg oral delayed release tablet  -- 1 tab(s) by mouth once a day    oxyCODONE 5 mg oral tablet  -- 1 tab(s) by mouth every 4 hours, As needed, Moderate Pain (4 - 6) MDD:5    senna leaf extract oral tablet  -- 2 tab(s) by mouth once a day (at bedtime)    pantoprazole 40 mg oral delayed release tablet  -- 1 tab(s) by mouth once a day (before a meal)    apixaban 5 mg oral tablet  -- 1 tab(s) by mouth 2 times a day

## 2023-09-28 NOTE — PROGRESS NOTE ADULT - ASSESSMENT
A/P: 33y P s/p primary elective LTCS, h/o endocarditis and valve replacement, EBL 800cc, POD2, recovering well.    -ambulation encouraged  -PO hydration encouraged  -regular diet  -lovenox ordered for inpatient DVT prophylaxis, ASA for postpartum anticoagulation per cardiology recommendation   -Incentive Spirometry encouraged  -pain management per routine    Dr. Singh and Dr. Fam to be made aware

## 2023-09-28 NOTE — DISCHARGE NOTE OB - CARE PLAN
1 Principal Discharge DX:	Single delivery by  section  Assessment and plan of treatment:	Nothing in the vagina for 6 weeks (no sex, no tampons, no douching). Avoid tub baths, you may shower.  If you have a fever of 100.4F or greater, severe vaginal bleeding, or severe abdominal pain, call your Ob/Gyn or come to the emergency department immediately.  Please follow up with your provider in 1 week for incision check and 6 weeks for postpartum visit.

## 2023-09-28 NOTE — PROGRESS NOTE ADULT - SUBJECTIVE AND OBJECTIVE BOX
ELBA TO  33y  Female    PGY1 Note:    Patient seen and examined bedside. No overnight events. Denies heavy vaginal bleeding. Pain well controlled. Denies headaches, vision changes, dizziness, lightheadedness, CP, SOB, palpitations, RUQ pain. Denies fever, chills, nausea/vomiting. Ambulating without difficulty. Tolerating diet, voiding, passing flatus, BM+. Breastfeeding. Desires to go home today if possible.     Physical Exam  Vital Signs Last 24 Hrs  T(C): 37 (28 Sep 2023 03:43), Max: 37 (28 Sep 2023 03:43)  T(F): 98.6 (28 Sep 2023 03:43), Max: 98.6 (28 Sep 2023 03:43)  HR: 66 (28 Sep 2023 03:43) (63 - 94)  BP: 105/54 (28 Sep 2023 03:43) (100/58 - 115/65)  RR: 18 (28 Sep 2023 03:43) (18 - 18)      Gen: NAD, sitting comfortably  CV: RRR. No murmurs gallops or rubs.  Pulm: CTAB. No wheezes or rales.  Ext: No calf tenderness, no swelling.   Abd: Nondistended, soft, nontender, BS+, fundus firm, and below umbilicus.   Lochia: Minimal rubra  Wound: Pfannenstiel skin incision clean, dry intact. Some bruising noted around incision site, nontender. Dermabond in place. No surrounding edema or erythema.    PAST MEDICAL & SURGICAL HISTORY:  Murmur  VSD (ventricular septal defect)  H/O bacterial endocarditis  H/O mitral valve replacement    Diet: regular    Labs:                        11.5   17.24 )-----------( 245      ( 27 Sep 2023 12:06 )             32.8                         13.2   18.72 )-----------( 233      ( 26 Sep 2023 21:13 )             37.6          acetaminophen     Tablet .. 975 milliGRAM(s) Oral <User Schedule>  acetaminophen     Tablet .. 975 milliGRAM(s) Oral every 6 hours  diphenhydrAMINE 25 milliGRAM(s) Oral every 6 hours PRN  diphtheria/tetanus/pertussis (acellular) Vaccine (Adacel) 0.5 milliLiter(s) IntraMuscular once  enoxaparin Injectable 40 milliGRAM(s) SubCutaneous every 24 hours  ferrous    sulfate 325 milliGRAM(s) Oral daily  folic acid 1 milliGRAM(s) Oral daily  ibuprofen  Tablet. 600 milliGRAM(s) Oral every 6 hours  lactated ringers. 1000 milliLiter(s) IV Continuous <Continuous>  lanolin Ointment 1 Application(s) Topical every 6 hours PRN  magnesium hydroxide Suspension 30 milliLiter(s) Oral two times a day PRN  oxyCODONE    IR 5 milliGRAM(s) Oral every 3 hours PRN  oxyCODONE    IR 5 milliGRAM(s) Oral once PRN  oxyCODONE    IR 5 milliGRAM(s) Oral every 3 hours PRN  oxyCODONE    IR 5 milliGRAM(s) Oral once PRN  oxytocin Infusion 333.333 milliUNIT(s)/Min IV Continuous <Continuous>  prenatal multivitamin 1 Tablet(s) Oral daily  simethicone 80 milliGRAM(s) Chew every 4 hours PRN

## 2023-09-28 NOTE — DISCHARGE NOTE OB - NS MD DC FALL RISK RISK
For information on Fall & Injury Prevention, visit: [email or web address] OR  [email or web address] OR  https://www.cdc.gov/steadi/patient.html

## 2023-09-29 VITALS
HEART RATE: 69 BPM | RESPIRATION RATE: 18 BRPM | DIASTOLIC BLOOD PRESSURE: 77 MMHG | TEMPERATURE: 97 F | SYSTOLIC BLOOD PRESSURE: 119 MMHG

## 2023-09-29 RX ORDER — SENNA PLUS 8.6 MG/1
2 TABLET ORAL
Qty: 60 | Refills: 0
Start: 2023-09-29 | End: 2023-10-28

## 2023-09-29 RX ORDER — ASPIRIN/CALCIUM CARB/MAGNESIUM 324 MG
1 TABLET ORAL
Qty: 30 | Refills: 0
Start: 2023-09-29 | End: 2023-10-28

## 2023-09-29 RX ORDER — IBUPROFEN 200 MG
1 TABLET ORAL
Qty: 56 | Refills: 0
Start: 2023-09-29 | End: 2023-10-12

## 2023-09-29 RX ORDER — SIMETHICONE 80 MG/1
1 TABLET, CHEWABLE ORAL
Qty: 84 | Refills: 0
Start: 2023-09-29 | End: 2023-10-12

## 2023-09-29 RX ORDER — ACETAMINOPHEN 500 MG
3 TABLET ORAL
Qty: 168 | Refills: 0
Start: 2023-09-29 | End: 2023-10-12

## 2023-09-29 RX ADMIN — Medication 600 MILLIGRAM(S): at 11:08

## 2023-09-29 RX ADMIN — Medication 975 MILLIGRAM(S): at 12:50

## 2023-09-29 RX ADMIN — Medication 600 MILLIGRAM(S): at 06:10

## 2023-09-29 RX ADMIN — Medication 325 MILLIGRAM(S): at 11:07

## 2023-09-29 RX ADMIN — Medication 600 MILLIGRAM(S): at 12:50

## 2023-09-29 RX ADMIN — ENOXAPARIN SODIUM 40 MILLIGRAM(S): 100 INJECTION SUBCUTANEOUS at 07:15

## 2023-09-29 RX ADMIN — Medication 1 MILLIGRAM(S): at 11:07

## 2023-09-29 RX ADMIN — Medication 600 MILLIGRAM(S): at 00:11

## 2023-09-29 RX ADMIN — Medication 975 MILLIGRAM(S): at 03:44

## 2023-09-29 RX ADMIN — Medication 1 TABLET(S): at 11:07

## 2023-09-29 RX ADMIN — Medication 600 MILLIGRAM(S): at 06:59

## 2023-09-29 RX ADMIN — Medication 975 MILLIGRAM(S): at 04:14

## 2023-09-29 RX ADMIN — Medication 81 MILLIGRAM(S): at 11:07

## 2023-09-29 RX ADMIN — Medication 975 MILLIGRAM(S): at 10:34

## 2023-09-29 NOTE — PROGRESS NOTE ADULT - ASSESSMENT
A/P: 33y P1 s/p primary elective LTCS, h/o endocarditis and valve replacement, EBL 800cc, POD3, recovering well.    -ambulation encouraged  -PO hydration encouraged  -regular diet  -lovenox ordered for inpatient DVT prophylaxis, ASA for postpartum anticoagulation per cardiology recommendation   -Incentive Spirometry encouraged  -pain management per routine    Dr. Singh and Dr. Fam to be made aware

## 2023-09-29 NOTE — PROGRESS NOTE ADULT - ATTENDING SUPERVISION STATEMENT
Pt called in stating her last DEXA scan was Dec 2018. Also pt would like to know if she needs to take any calcium medicine. Pt is requesting back from [address] [place] [address].
Resident

## 2023-09-29 NOTE — PROGRESS NOTE ADULT - ATTENDING COMMENTS
patient seen at bedside, resting comfortably, she denies CP, SOB, states pain controlled with medicine.  She has been tolerating diet without N/V and has voided without difficulty.  She states + flatus and +BM.  She states moderate lochia.  No other complaints.  She would like baby to have circumcision and informed, witnessed consent was signed.     cardiology consult appreciated, aspirin 81mg restarted, has outpatient follow up    Vitals WNL, hgb 11, other labs stable    Chest: RRR, CTA b/l no crackles/rales, breasts soft  Abd: +BS, soft, ND, appropriate tenderness, uterus firm, incision C/D/I without evidence of infection- minimal bruising noted  Vag: moderate lochia  LE: minimal b/l LE edema, NT    A: 32yo P1 s/p elective P C/S, POD#2, s/p heart valve replacement- stable and asx    P: continue current postop and PP care      continue aspirin and lovenox as per protocol      regular diet as tolerated- reglan PRN      pain mgmt q 3 hours      encouraged ambulation       pain, bleeding and infection precautions      will reassess as clinically indicated      patient states that she would prefer to go home tomorrow when she feels more comfortable and baby cleared
patient seen at bedside, resting comfortably, she denies CP, SOB, N/V, tolerating diet, OOB ambulation without complication, voiding without difficulty, +flatus and +BM.  She states pain is controlled with medication.  She feels good and would like to go home today    Vitals WNL, labs stable    chest: CTA, good air entry,  no crackles/rales, breasts soft  abd: soft, ND, +BS, uterus firm, incision C/D/I, bruising is stable from yesterday with no expansion  Vag: light lochia  LE: minimal b/l LE edema, NT    A: s/p P C/S POD#3- stable, h/o heart valve replacement    P: continue current postop/PP care      regular diet      OOB ambulation encouraged      aspirin anticoagulation for home      f/u cardio as scheduled      pain, bleeding and infection precautions      d/c home and f/u office 1-2 wks or PRN
patient seen at bedside, resting comfortably, she denies CP, SOB, states pain controlled with medicine.  She did state that since procedure she has not been able to tolerate diet but this AM has tolerated liquids.  She states moderate lochia.  No other complaints.  Since the procedure her UO was borderline adequate but the last 3 hours it has increased significantly and clear and the shelton was removed.  Will monitor for TOV and volume.    Vitals WNL, labs stable    Chest: RRR, CTA b/l no crackles/rales, breasts soft  Abd: +BS, soft, ND, appropriate tenderness, uterus firm, incision C/D/I without evidence of infection  Vag: moderate lochia  LE: minimal b/l LE edema, NT    A: 32yo P1 s/p elective P C/S, POD#1, s/p heart valve replacement- stable and asx    P: continue current postop and PP care      f/u morning labs      f/u cardio consultation regarding PP/postop recommendations /anticoagulation      will restart aspirin and lovenox as per protocol      regular diet as tolerated- reglan PRN      TOV      pain mgmt q 3 hours      encouraged ambulation       pain, bleeding and infection precautions      will reassess as clinically indicated

## 2023-09-29 NOTE — PROGRESS NOTE ADULT - SUBJECTIVE AND OBJECTIVE BOX
ELBA TO  33y  Female    PGY1 Note:    Patient seen and examined bedside. No overnight events. Denies heavy vaginal bleeding. Pain well controlled. Denies headaches, vision changes, dizziness, lightheadedness, CP, SOB, palpitations, RUQ pain. Denies fever, chills, nausea/vomiting. Ambulating without difficulty. Tolerating diet, voiding, passing flatus, + BM. Breastfeeding.     Physical Exam  Vital Signs Last 24 Hrs  T(C): 36.3 (29 Sep 2023 08:04), Max: 36.7 (28 Sep 2023 23:36)  T(F): 97.4 (29 Sep 2023 08:04), Max: 98.1 (28 Sep 2023 23:36)  HR: 69 (29 Sep 2023 08:04) (69 - 74)  BP: 119/77 (29 Sep 2023 08:04) (113/70 - 119/77)  RR: 18 (29 Sep 2023 08:04) (18 - 18)    Gen: NAD, sitting comfortably  CV: RRR. No murmurs gallops or rubs.  Pulm: CTAB. No wheezes or rales.  Ext: No calf tenderness, no swelling.   Abd: Nondistended, soft, nontender, BS+, fundus firm, and below umbilicus.   Lochia: Minimal rubra  Wound: Pfannenstiel skin incision clean, dry intact. dermabond in place. No surrounding edema or erythema.      PAST MEDICAL & SURGICAL HISTORY:  Murmur  VSD (ventricular septal defect)  H/O bacterial endocarditis  H/O mitral valve replacement    Diet: regular    Labs:                        11.5   17.24 )-----------( 245      ( 27 Sep 2023 12:06 )             32.8                         13.2   18.72 )-----------( 233      ( 26 Sep 2023 21:13 )             37.6          acetaminophen     Tablet .. 975 milliGRAM(s) Oral every 6 hours  acetaminophen     Tablet .. 975 milliGRAM(s) Oral <User Schedule>  aspirin  chewable 81 milliGRAM(s) Oral daily  diphenhydrAMINE 25 milliGRAM(s) Oral every 6 hours PRN  diphtheria/tetanus/pertussis (acellular) Vaccine (Adacel) 0.5 milliLiter(s) IntraMuscular once  enoxaparin Injectable 40 milliGRAM(s) SubCutaneous every 24 hours  ferrous    sulfate 325 milliGRAM(s) Oral daily  folic acid 1 milliGRAM(s) Oral daily  ibuprofen  Tablet. 600 milliGRAM(s) Oral every 6 hours  lactated ringers. 1000 milliLiter(s) IV Continuous <Continuous>  lanolin Ointment 1 Application(s) Topical every 6 hours PRN  magnesium hydroxide Suspension 30 milliLiter(s) Oral two times a day PRN  oxyCODONE    IR 5 milliGRAM(s) Oral every 3 hours PRN  oxyCODONE    IR 5 milliGRAM(s) Oral once PRN  oxyCODONE    IR 5 milliGRAM(s) Oral every 3 hours PRN  oxyCODONE    IR 5 milliGRAM(s) Oral once PRN  oxytocin Infusion 333.333 milliUNIT(s)/Min IV Continuous <Continuous>  prenatal multivitamin 1 Tablet(s) Oral daily  simethicone 80 milliGRAM(s) Chew every 4 hours PRN

## 2023-10-09 DIAGNOSIS — Z3A.39 39 WEEKS GESTATION OF PREGNANCY: ICD-10-CM

## 2023-10-09 DIAGNOSIS — Z79.82 LONG TERM (CURRENT) USE OF ASPIRIN: ICD-10-CM

## 2023-10-09 DIAGNOSIS — Z95.2 PRESENCE OF PROSTHETIC HEART VALVE: ICD-10-CM

## 2023-10-09 DIAGNOSIS — O34.211 MATERNAL CARE FOR LOW TRANSVERSE SCAR FROM PREVIOUS CESAREAN DELIVERY: ICD-10-CM

## 2023-10-09 DIAGNOSIS — N80.103 ENDOMETRIOSIS OF BILATERAL OVARIES, UNSPECIFIED DEPTH: ICD-10-CM

## 2023-10-09 DIAGNOSIS — O34.593 MATERNAL CARE FOR OTHER ABNORMALITIES OF GRAVID UTERUS, THIRD TRIMESTER: ICD-10-CM

## 2023-10-09 DIAGNOSIS — Z87.74 PERSONAL HISTORY OF (CORRECTED) CONGENITAL MALFORMATIONS OF HEART AND CIRCULATORY SYSTEM: ICD-10-CM

## 2023-10-09 DIAGNOSIS — K21.9 GASTRO-ESOPHAGEAL REFLUX DISEASE WITHOUT ESOPHAGITIS: ICD-10-CM

## 2023-10-13 ENCOUNTER — APPOINTMENT (OUTPATIENT)
Dept: OBGYN | Facility: CLINIC | Age: 33
End: 2023-10-13
Payer: COMMERCIAL

## 2023-10-13 VITALS
HEART RATE: 80 BPM | HEIGHT: 63 IN | TEMPERATURE: 98.6 F | BODY MASS INDEX: 27.46 KG/M2 | DIASTOLIC BLOOD PRESSURE: 80 MMHG | SYSTOLIC BLOOD PRESSURE: 121 MMHG | WEIGHT: 155 LBS

## 2023-10-13 PROBLEM — Z86.79 PERSONAL HISTORY OF OTHER DISEASES OF THE CIRCULATORY SYSTEM: Chronic | Status: ACTIVE | Noted: 2023-09-26

## 2023-10-13 PROCEDURE — 99213 OFFICE O/P EST LOW 20 MIN: CPT

## 2023-10-20 ENCOUNTER — APPOINTMENT (OUTPATIENT)
Dept: OBGYN | Facility: CLINIC | Age: 33
End: 2023-10-20
Payer: COMMERCIAL

## 2023-10-20 VITALS
WEIGHT: 160 LBS | BODY MASS INDEX: 28.35 KG/M2 | HEIGHT: 63 IN | HEART RATE: 74 BPM | TEMPERATURE: 98.6 F | SYSTOLIC BLOOD PRESSURE: 120 MMHG | DIASTOLIC BLOOD PRESSURE: 90 MMHG

## 2023-10-20 DIAGNOSIS — Z98.891 HISTORY OF UTERINE SCAR FROM PREVIOUS SURGERY: ICD-10-CM

## 2023-10-20 PROCEDURE — 0503F POSTPARTUM CARE VISIT: CPT

## 2023-10-30 ENCOUNTER — NON-APPOINTMENT (OUTPATIENT)
Age: 33
End: 2023-10-30

## 2023-11-14 PROBLEM — Z98.891 S/P CESAREAN SECTION: Status: ACTIVE | Noted: 2023-04-25

## 2023-11-16 NOTE — DISCHARGE NOTE PROVIDER - NSDCQMSTROKE_NEU_ALL_CORE
[facility] ([place])  Obstetrics  Postpartum Progress Note    Patient Name: Baylee Casanova  MRN: [id number]  Admission Date: 11/15/2023  Hospital Length of Stay: 1 days  Attending Physician: Leona San,*  Primary Care Provider: Tremayne Fisher MD    Subjective:     Principal Problem:Vaginal delivery    Hospital Course:  11/16/23 PPD#1, doing well, normal involution, breastfeeding going well, potential d/c today    Interval History:     She is doing well this morning. She is tolerating a regular diet without nausea or vomiting. She is voiding spontaneously. She is ambulating. She has passed flatus, and has not a BM. Vaginal bleeding is mild. She denies fever or chills. Abdominal pain is mild and controlled with oral medications. She Is breastfeeding. She desires circumcision for her male baby: not applicable.    Objective:     Vital Signs (Most Recent):  Temp: 97.6 °F (36.4 °C) (11/16/23 0744)  Pulse: 96 (11/16/23 0744)  Resp: 18 (11/16/23 0744)  BP: 102/66 (11/16/23 0744)  SpO2: 96 % (11/16/23 0744) Vital Signs (24h Range):  Temp:  [97.6 °F (36.4 °C)-99.5 °F (37.5 °C)] 97.6 °F (36.4 °C)  Pulse:  [] 96  Resp:  [16-18] 18  SpO2:  [95 %-100 %] 96 %  BP: ()/(51-81) 102/66     Weight: 88 kg (194 lb 0.1 oz)  Body mass index is 29.51 kg/m².      Intake/Output Summary (Last 24 hours) at 11/16/2023 0810  Last data filed at 11/15/2023 2300  Gross per 24 hour   Intake 2063.74 ml   Output 2950 ml   Net -886.26 ml         Significant Labs:  Lab Results   Component Value Date    GROUPTRH B POS 11/15/2023    HEPBSAG Negative 06/03/2019    STREPBCULT No Group B Streptococcus isolated 10/20/2023     Recent Labs   Lab 11/16/23  0555   HGB 10.2*   HCT 31.6*       I have personallly reviewed all pertinent lab results from the last 24 hours.  Recent Lab Results         11/16/23  0555        Baso # 0.06       Basophil % 0.3       Differential Method Automated       Eos # 0.3       Eosinophil % 1.9        Gran # (ANC) 12.6       Gran % 70.8       Hematocrit 31.6       Hemoglobin 10.2       Immature Grans (Abs) 0.17  Comment: Mild elevation in immature granulocytes is non specific and   can be seen in a variety of conditions including stress response,   acute inflammation, trauma and pregnancy. Correlation with other   laboratory and clinical findings is essential.         Immature Granulocytes 1.0       Lymph # 3.2       Lymph % 17.8       MCH 27.3       MCHC 32.3       MCV 85       Mono # 1.5       Mono % 8.2       MPV 9.4       nRBC 0       Platelet Count 230       RBC 3.73       RDW 13.9       WBC 17.73               Physical Exam:   Constitutional: She is oriented to person, place, and time. She appears well-developed and well-nourished.    HENT:   Head: Normocephalic and atraumatic.    Eyes: EOM are normal.     Cardiovascular:  Normal rate.             Pulmonary/Chest: Effort normal.        Abdominal: Soft. There is no abdominal tenderness.             Musculoskeletal: Normal range of motion.       Neurological: She is alert and oriented to person, place, and time.    Skin: Skin is warm and dry.    Psychiatric: She has a normal mood and affect. Her behavior is normal. Judgment and thought content normal.       Review of Systems   Constitutional:  Negative for fever.   Eyes:  Negative for visual disturbance.   Cardiovascular:  Negative for chest pain.   Gastrointestinal:  Positive for abdominal pain.        Cramping     Genitourinary:  Positive for vaginal bleeding. Negative for vaginal pain.        Mild lochia rubra     Musculoskeletal:  Negative for back pain.   Neurological:  Negative for headaches.   All other systems reviewed and are negative.    Assessment/Plan:     28 y.o. female  for:    * Vaginal delivery  Routine postpartum care    Anemia during pregnancy  Iron PP      Disposition: As patient meets milestones, will plan to discharge today on PPD#1.    Nan Norris CNM  Obstetrics  [facility] -  [facility] ([place])       No

## 2023-11-21 ENCOUNTER — TRANSCRIPTION ENCOUNTER (OUTPATIENT)
Age: 33
End: 2023-11-21

## 2023-12-18 ENCOUNTER — APPOINTMENT (OUTPATIENT)
Dept: OBGYN | Facility: CLINIC | Age: 33
End: 2023-12-18
Payer: COMMERCIAL

## 2023-12-18 VITALS
TEMPERATURE: 98.6 F | BODY MASS INDEX: 29.41 KG/M2 | SYSTOLIC BLOOD PRESSURE: 120 MMHG | HEIGHT: 63 IN | DIASTOLIC BLOOD PRESSURE: 80 MMHG | HEART RATE: 78 BPM | WEIGHT: 166 LBS

## 2023-12-18 DIAGNOSIS — Z71.2 PERSON CONSULTING FOR EXPLANATION OF EXAMINATION OR TEST FINDINGS: ICD-10-CM

## 2023-12-18 DIAGNOSIS — Z30.09 ENCOUNTER FOR OTHER GENERAL COUNSELING AND ADVICE ON CONTRACEPTION: ICD-10-CM

## 2023-12-18 DIAGNOSIS — Z01.419 ENCOUNTER FOR GYNECOLOGICAL EXAMINATION (GENERAL) (ROUTINE) W/OUT ABNORMAL FINDINGS: ICD-10-CM

## 2023-12-18 DIAGNOSIS — Z48.89 ENCOUNTER FOR OTHER SPECIFIED SURGICAL AFTERCARE: ICD-10-CM

## 2023-12-18 DIAGNOSIS — E66.3 OVERWEIGHT: ICD-10-CM

## 2023-12-18 DIAGNOSIS — Z71.89 OTHER SPECIFIED COUNSELING: ICD-10-CM

## 2023-12-18 DIAGNOSIS — Z12.4 ENCOUNTER FOR SCREENING FOR MALIGNANT NEOPLASM OF CERVIX: ICD-10-CM

## 2023-12-18 PROCEDURE — 99213 OFFICE O/P EST LOW 20 MIN: CPT | Mod: 25

## 2023-12-18 PROCEDURE — 99395 PREV VISIT EST AGE 18-39: CPT

## 2023-12-20 LAB
C TRACH RRNA SPEC QL NAA+PROBE: NOT DETECTED
N GONORRHOEA RRNA SPEC QL NAA+PROBE: NOT DETECTED
SOURCE AMPLIFICATION: NORMAL
SOURCE AMPLIFICATION: NORMAL
T VAGINALIS RRNA SPEC QL NAA+PROBE: NOT DETECTED

## 2023-12-21 LAB — HPV HIGH+LOW RISK DNA PNL CVX: NOT DETECTED

## 2023-12-22 LAB — CYTOLOGY CVX/VAG DOC THIN PREP: NORMAL

## 2024-01-01 PROBLEM — Z12.4 ENCOUNTER FOR SCREENING FOR CERVICAL CANCER: Status: ACTIVE | Noted: 2022-09-13

## 2024-01-01 PROBLEM — Z30.09 GENERAL COUNSELING AND ADVICE FOR CONTRACEPTIVE MANAGEMENT: Status: ACTIVE | Noted: 2022-09-15

## 2024-01-01 PROBLEM — E66.3 OVERWEIGHT: Status: ACTIVE | Noted: 2022-09-15

## 2024-01-01 PROBLEM — Z71.89 OTHER SPECIFIED COUNSELING: Status: ACTIVE | Noted: 2022-09-15

## 2024-01-01 PROBLEM — Z71.2 ENCOUNTER TO DISCUSS TEST RESULTS: Status: ACTIVE | Noted: 2023-09-19

## 2024-01-01 PROBLEM — Z48.89 ENCOUNTER FOR POSTOPERATIVE WOUND CHECK: Status: ACTIVE | Noted: 2023-09-12

## 2024-01-01 NOTE — HISTORY OF PRESENT ILLNESS
[Patient reported PAP Smear was normal] : Patient reported PAP Smear was normal [Normal Amount/Duration] :  normal amount and duration [Regular Cycle Intervals] : periods have been regular [Frequency: Q ___ days] : menstrual periods occur approximately every [unfilled] days [Menarche Age: ____] : age at menarche was [unfilled] [Currently Active] : currently active [Men] : men [No] : No [Gonorrhea test offered] : Gonorrhea test offered [Chlamydia test offered] : Chlamydia test offered [Trichomonas test offered] : Trichomonas test offered [HPV test offered] : HPV test offered [Y] : Patient uses contraception [TextBox_4] : 33-year-old para 1011 with LMP 23 came for annual GYN exam and Pap smear. She also was noted to have a large postop seroma after recent  section that as being monitored.  She was given abx and asked to go for CT scan which was done and patient was called to discuss the report showing seroma and fluid but no fascia break or other complications.  She was asked to come previously to try to drain the seroma but decided to wait and come today.  She states that she feels the seroma is much harder and doesn't feel like there is fluid but unsure if needs drained.  She denies pain, erythema or infection like symptoms of the area. Patient denies abnormal uterine bleeding, pelvic pain, discharge or dysuria. She also denies h/o abnormal Pap, HPV, STDs, fibroids or ovarian cysts. She is sexually active and uses condoms for contraception. She is not interested in other contraception at this time. [PapSmeardate] : 9/2022 [LMPDate] : 11/22/23 [MensesFreq] : 28 [PGHxTotal] : 2 [[facility] CenterxFulerm] : 1 [PGHxPremature] : 0 [PGHxAbortions] : 1 [[facility] Centeriving] : 1 [FreeTextEntry1] : 11/22/23 [Yes] : Yes [Condoms] : Condoms

## 2024-01-01 NOTE — PHYSICAL EXAM
[Chaperone Declined] : Patient declined chaperone [Appropriately responsive] : appropriately responsive [Alert] : alert [No Acute Distress] : no acute distress [No Lymphadenopathy] : no lymphadenopathy [Regular Rate Rhythm] : regular rate rhythm [Soft] : soft [Non-tender] : non-tender [Non-distended] : non-distended [No Mass] : no mass [Oriented x3] : oriented x3 [Examination Of The Breasts] : a normal appearance [No Masses] : no breast masses were palpable [No Lesions] : no lesions  [Labia Majora] : normal [Labia Minora] : normal [Normal] : normal [No Bleeding] : There was no active vaginal bleeding [Tenderness] : nontender [Enlarged ___ wks] : not enlarged [Mass ___ cm] : no uterine mass was palpated [Uterine Adnexae] : normal [FreeTextEntry5] : pap smear done [FreeTextEntry6] : exam limited to habitus 1 Principal Discharge DX:	Mallet finger, right

## 2024-01-01 NOTE — COUNSELING
[Nutrition/ Exercise/ Weight Management] : nutrition, exercise, weight management [Vitamins/Supplements] : vitamins/supplements [Breast Self Exam] : breast self exam [Bladder Hygiene] : bladder hygiene [Contraception/ Emergency Contraception/ Safe Sexual Practices] : contraception, emergency contraception, safe sexual practices [Lab Results] : lab results [STD (testing, results, tx)] : STD (testing, results, tx) [Medication Management] : medication management [Pre/Post Op Instructions] : pre/post op instructions

## 2024-01-01 NOTE — DISCUSSION/SUMMARY
[FreeTextEntry1] : A: 34yo for annual GYN Exam, postop seroma- healing, BMI 29  P: GYN Exam done     pap smear done     safe sex practices      pain and bleeding precautions      wound care discussed- results reviewed and discussed and mgmt options cousneled- patient will continue conservative mgmt for now      contraception counseling done - nothing wanted at this time       encourage healthy diet and activity as tolerated       f/u for routine exam or PRN

## 2024-02-07 ENCOUNTER — APPOINTMENT (OUTPATIENT)
Dept: CARDIOLOGY | Facility: CLINIC | Age: 34
End: 2024-02-07
Payer: COMMERCIAL

## 2024-02-07 VITALS
HEART RATE: 78 BPM | HEIGHT: 63 IN | WEIGHT: 168 LBS | BODY MASS INDEX: 29.77 KG/M2 | SYSTOLIC BLOOD PRESSURE: 118 MMHG | DIASTOLIC BLOOD PRESSURE: 82 MMHG

## 2024-02-07 DIAGNOSIS — Q21.0 VENTRICULAR SEPTAL DEFECT: ICD-10-CM

## 2024-02-07 DIAGNOSIS — Z95.2 PRESENCE OF PROSTHETIC HEART VALVE: ICD-10-CM

## 2024-02-07 DIAGNOSIS — Z86.79 PERSONAL HISTORY OF OTHER DISEASES OF THE CIRCULATORY SYSTEM: ICD-10-CM

## 2024-02-07 PROCEDURE — 99213 OFFICE O/P EST LOW 20 MIN: CPT | Mod: 25

## 2024-02-07 PROCEDURE — 93000 ELECTROCARDIOGRAM COMPLETE: CPT

## 2024-02-07 RX ORDER — AMOXICILLIN 500 MG/1
500 TABLET, FILM COATED ORAL
Qty: 4 | Refills: 0 | Status: ACTIVE | COMMUNITY
Start: 2024-02-07 | End: 1900-01-01

## 2024-02-07 NOTE — HISTORY OF PRESENT ILLNESS
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 Treated with IV ABX and transferred to [facility] for surgical intervention. on 11/22/22 she underwent successful mitral valve vegetation resection and replacement with 31 mm tissue epic valve. VSD closed primarily.   Patient recovered well, followed with CT Sx and ID on regular basis, finished full course of IV ABx as recommended by ID and pic line removed. Repeated echo on 2/2023. showed normal EF, prosthetic mitral valve in place functioning normally   later on patient get pregnant currently , eliquis was stopped (she already received for 3 months) On 5/2023 patient presented to ED for atypical chest pain (non cardiac) repeated echo was stable normal EF  CE negative   patient delivered healthy baby ~ 4 months ago, via c section , recovered well  Today  patient is stable asymptomatic feels comfortable, denies chest pain, shortness of breath , TINAJERO and palpitations no syncope.   BP controlled.   EKG showed sinus rhythm normal

## 2024-02-07 NOTE — PHYSICAL EXAM
[No Acute Distress] : no acute distress [Normal S1, S2] : normal S1, S2 [No Murmur] : no murmur [Clear Lung Fields] : clear lung fields [No Respiratory Distress] : no respiratory distress  [Normal Gait] : normal gait [Normal] : moves all extremities, no focal deficits, normal speech [Alert and Oriented] : alert and oriented [de-identified] : normal healing of mid sternal surgical sternal wound, no edema, non tenderness, no sweliing.

## 2024-02-07 NOTE — REVIEW OF SYSTEMS
[Negative] : Psychiatric [SOB] : no shortness of breath [Dyspnea on exertion] : not dyspnea during exertion [Chest Discomfort] : no chest discomfort [Lower Ext Edema] : no extremity edema [Palpitations] : no palpitations [Orthopnea] : no orthopnea [Syncope] : no syncope

## 2024-02-07 NOTE — ASSESSMENT
[FreeTextEntry1] : 1) 11/22/22 s/p successful mitral valve vegetation resection and replacement with 31 mm tissue epic valve. VSD closed primarily..  Currently stable, repeated echo 2/2023 and 5/2023  normal EF normal functioning prosthetic mitral valve  continue asa   2)Pregnancy  eliquis stopped  delivered healthy  baby via c/section wtih no complications currently stable asymptomatic.    3)First degree av block  currently resolved normal WA on EKG today  asymptomatic , hemodynamically stable  4)Planning to do dental workup, antibiotic prophylaxis given   follow up in 6 months

## 2024-08-14 ENCOUNTER — APPOINTMENT (OUTPATIENT)
Dept: CARDIOLOGY | Facility: CLINIC | Age: 34
End: 2024-08-14
Payer: MEDICAID

## 2024-08-14 VITALS
SYSTOLIC BLOOD PRESSURE: 116 MMHG | DIASTOLIC BLOOD PRESSURE: 88 MMHG | HEART RATE: 81 BPM | HEIGHT: 63 IN | WEIGHT: 164 LBS | BODY MASS INDEX: 29.06 KG/M2

## 2024-08-14 DIAGNOSIS — Q21.0 VENTRICULAR SEPTAL DEFECT: ICD-10-CM

## 2024-08-14 DIAGNOSIS — Z95.2 PRESENCE OF PROSTHETIC HEART VALVE: ICD-10-CM

## 2024-08-14 DIAGNOSIS — Z86.79 PERSONAL HISTORY OF OTHER DISEASES OF THE CIRCULATORY SYSTEM: ICD-10-CM

## 2024-08-14 PROCEDURE — 99214 OFFICE O/P EST MOD 30 MIN: CPT | Mod: 25

## 2024-08-14 PROCEDURE — 93000 ELECTROCARDIOGRAM COMPLETE: CPT

## 2024-08-14 NOTE — PHYSICAL EXAM
[No Acute Distress] : no acute distress [Normal S1, S2] : normal S1, S2 [No Murmur] : no murmur [Clear Lung Fields] : clear lung fields [No Respiratory Distress] : no respiratory distress  [Normal Gait] : normal gait [Normal] : moves all extremities, no focal deficits, normal speech [Alert and Oriented] : alert and oriented [Murmur] : murmur [de-identified] : + click post mitral valve repair [de-identified] : normal healing of mid sternal surgical sternal wound, no edema, non tenderness, no sweliing.

## 2024-08-14 NOTE — ASSESSMENT
[FreeTextEntry1] : # 11/22/22 s/p successful mitral valve vegetation resection and replacement with 31 mm tissue epic valve. VSD closed primarily..  Currently stable, repeated echo 2/2023 and 5/2023  normal EF normal functioning prosthetic mitral valve  continue asa  will repeat echo cardio (last echo > 1 year)  #First degree av block  currently resolved normal OR on EKG today  asymptomatic , hemodynamically stable  #Heart healthy diet , exercise weight loss discuss in detail cardiac prevention measures.   follow up in 6 months

## 2024-08-14 NOTE — HISTORY OF PRESENT ILLNESS
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 Treated with IV ABX and transferred to [facility] for surgical intervention. on 11/22/22 she underwent successful mitral valve vegetation resection and replacement with 31 mm tissue epic valve. VSD closed primarily.   Patient recovered well, followed with CT Sx and ID on regular basis, finished full course of IV ABx as recommended by ID and pic line removed. Repeated echo on 2/2023. showed normal EF, prosthetic mitral valve in place functioning normally   later on patient get pregnant currently , eliquis was stopped (she already received for 3 months) On 5/2023 patient presented to ED for atypical chest pain (non cardiac) repeated echo was stable normal EF  CE negative   patient delivered healthy baby ~ 4 months ago, via c section , recovered well  Today  patient is stable asymptomatic feels comfortable, denies chest pain, shortness of breath , TINAJERO and palpitations no syncope.   BP controlled.   EKG showed sinus rhythm normal

## 2024-09-23 NOTE — ED PROVIDER NOTE - ADDITIONAL NOTES AND INSTRUCTIONS:
Normal mammogram, repeat in 1 year, results released through [employer]. Please verify that patient has viewed results. If not, please call patient with interpretation below:    I have reviewed the results of your mammogram and it appears that everything was read as normal.  Based on this, the radiologist has recommended that you recheck a mammogram in 1 year.     Also please see below health maintenance items that are due:    Hepatitis C Screening Never done  HIV Screening Never done  Hemoglobin A1c (Diabetic Prevention Screening) Never done  Influenza Vaccine(1) due on 09/01/2024  COVID-19 Vaccine(1 - 2023-24 season) Never done   Aleyda Verma was seen at [facility] for an urgent medical issue today. Please excuse her from work.

## 2025-02-10 ENCOUNTER — LABORATORY RESULT (OUTPATIENT)
Age: 35
End: 2025-02-10

## 2025-02-10 ENCOUNTER — APPOINTMENT (OUTPATIENT)
Dept: OBGYN | Facility: CLINIC | Age: 35
End: 2025-02-10

## 2025-02-10 VITALS
HEIGHT: 63 IN | SYSTOLIC BLOOD PRESSURE: 131 MMHG | HEART RATE: 97 BPM | TEMPERATURE: 98.6 F | WEIGHT: 165 LBS | DIASTOLIC BLOOD PRESSURE: 86 MMHG | BODY MASS INDEX: 29.23 KG/M2

## 2025-02-10 DIAGNOSIS — Z01.419 ENCOUNTER FOR GYNECOLOGICAL EXAMINATION (GENERAL) (ROUTINE) W/OUT ABNORMAL FINDINGS: ICD-10-CM

## 2025-02-10 LAB
BILIRUB UR QL STRIP: NORMAL
CLARITY UR: CLEAR
COLLECTION METHOD: NORMAL
GLUCOSE UR-MCNC: NORMAL
HCG UR QL: 0.2 EU/DL
HCG UR QL: POSITIVE
HGB UR QL STRIP.AUTO: NORMAL
KETONES UR-MCNC: NORMAL
LEUKOCYTE ESTERASE UR QL STRIP: NORMAL
NITRITE UR QL STRIP: NORMAL
PH UR STRIP: 5.5
PROT UR STRIP-MCNC: NORMAL
QUALITY CONTROL: YES
SP GR UR STRIP: >=1.03

## 2025-02-10 PROCEDURE — 81025 URINE PREGNANCY TEST: CPT

## 2025-02-10 PROCEDURE — 81003 URINALYSIS AUTO W/O SCOPE: CPT | Mod: QW

## 2025-02-10 PROCEDURE — 99214 OFFICE O/P EST MOD 30 MIN: CPT | Mod: 25

## 2025-02-10 PROCEDURE — 76817 TRANSVAGINAL US OBSTETRIC: CPT | Mod: 59

## 2025-02-10 PROCEDURE — 99395 PREV VISIT EST AGE 18-39: CPT | Mod: 25

## 2025-02-12 ENCOUNTER — APPOINTMENT (OUTPATIENT)
Dept: CARDIOLOGY | Facility: CLINIC | Age: 35
End: 2025-02-12
Payer: MEDICAID

## 2025-02-12 DIAGNOSIS — Z86.79 PERSONAL HISTORY OF OTHER DISEASES OF THE CIRCULATORY SYSTEM: ICD-10-CM

## 2025-02-12 DIAGNOSIS — Z95.2 PRESENCE OF PROSTHETIC HEART VALVE: ICD-10-CM

## 2025-02-12 PROCEDURE — 93306 TTE W/DOPPLER COMPLETE: CPT

## 2025-02-13 LAB
ANION GAP SERPL CALC-SCNC: 14 MMOL/L
APTT BLD: 32.5 SEC
BASOPHILS # BLD AUTO: 0.04 K/UL
BASOPHILS NFR BLD AUTO: 0.3 %
BUN SERPL-MCNC: 10 MG/DL
C TRACH RRNA SPEC QL NAA+PROBE: NOT DETECTED
CALCIUM SERPL-MCNC: 9.8 MG/DL
CHLORIDE SERPL-SCNC: 105 MMOL/L
CO2 SERPL-SCNC: 21 MMOL/L
CREAT SERPL-MCNC: 0.7 MG/DL
EGFR: 116 ML/MIN/1.73M2
EOSINOPHIL # BLD AUTO: 0.15 K/UL
EOSINOPHIL NFR BLD AUTO: 1.3 %
GLUCOSE SERPL-MCNC: 113 MG/DL
HCG SERPL-MCNC: 5116 MIU/ML
HCG SERPL-MCNC: 8867 MIU/ML
HCT VFR BLD CALC: 42.5 %
HGB BLD-MCNC: 14.5 G/DL
HPV HIGH+LOW RISK DNA PNL CVX: NOT DETECTED
IMM GRANULOCYTES NFR BLD AUTO: 0.4 %
INR PPP: 0.91 RATIO
LYMPHOCYTES # BLD AUTO: 2.51 K/UL
LYMPHOCYTES NFR BLD AUTO: 21.2 %
MAN DIFF?: NORMAL
MCHC RBC-ENTMCNC: 29.7 PG
MCHC RBC-ENTMCNC: 34.1 G/DL
MCV RBC AUTO: 87.1 FL
MONOCYTES # BLD AUTO: 0.7 K/UL
MONOCYTES NFR BLD AUTO: 5.9 %
N GONORRHOEA RRNA SPEC QL NAA+PROBE: NOT DETECTED
NEUTROPHILS # BLD AUTO: 8.41 K/UL
NEUTROPHILS NFR BLD AUTO: 70.9 %
PLATELET # BLD AUTO: 318 K/UL
PMV BLD AUTO: 0 /100 WBCS
POTASSIUM SERPL-SCNC: 4.1 MMOL/L
PROGEST SERPL-MCNC: 15.64 NG/ML
PROGEST SERPL-MCNC: 19.79 NG/ML
PT BLD: 10.7 SEC
RBC # BLD: 4.88 M/UL
RBC # FLD: 13.5 %
SODIUM SERPL-SCNC: 140 MMOL/L
SOURCE AMPLIFICATION: NORMAL
SOURCE AMPLIFICATION: NORMAL
T VAGINALIS RRNA SPEC QL NAA+PROBE: NOT DETECTED
WBC # FLD AUTO: 11.86 K/UL

## 2025-02-17 LAB
CYTOLOGY CVX/VAG DOC THIN PREP: NORMAL
HCG SERPL-MCNC: ABNORMAL MIU/ML
PROGEST SERPL-MCNC: 14.42 NG/ML

## 2025-02-20 ENCOUNTER — APPOINTMENT (OUTPATIENT)
Dept: OBGYN | Facility: CLINIC | Age: 35
End: 2025-02-20

## 2025-02-20 ENCOUNTER — RESULT CHARGE (OUTPATIENT)
Age: 35
End: 2025-02-20

## 2025-02-20 VITALS
HEART RATE: 96 BPM | TEMPERATURE: 98.6 F | DIASTOLIC BLOOD PRESSURE: 79 MMHG | BODY MASS INDEX: 29.77 KG/M2 | WEIGHT: 168 LBS | SYSTOLIC BLOOD PRESSURE: 128 MMHG | HEIGHT: 63 IN

## 2025-02-20 DIAGNOSIS — O09.90 SUPERVISION OF HIGH RISK PREGNANCY, UNSPECIFIED, UNSPECIFIED TRIMESTER: ICD-10-CM

## 2025-02-20 DIAGNOSIS — Z33.1 PREGNANT STATE, INCIDENTAL: ICD-10-CM

## 2025-02-20 DIAGNOSIS — N91.2 AMENORRHEA, UNSPECIFIED: ICD-10-CM

## 2025-02-20 DIAGNOSIS — Z71.2 PERSON CONSULTING FOR EXPLANATION OF EXAMINATION OR TEST FINDINGS: ICD-10-CM

## 2025-02-20 DIAGNOSIS — E66.3 OVERWEIGHT: ICD-10-CM

## 2025-02-20 DIAGNOSIS — Z71.89 OTHER SPECIFIED COUNSELING: ICD-10-CM

## 2025-02-20 PROCEDURE — 99214 OFFICE O/P EST MOD 30 MIN: CPT | Mod: TH,25

## 2025-02-20 PROCEDURE — 76830 TRANSVAGINAL US NON-OB: CPT

## 2025-02-20 PROCEDURE — 76817 TRANSVAGINAL US OBSTETRIC: CPT

## 2025-03-04 ENCOUNTER — APPOINTMENT (OUTPATIENT)
Dept: OBGYN | Facility: CLINIC | Age: 35
End: 2025-03-04
Payer: MEDICAID

## 2025-03-04 ENCOUNTER — APPOINTMENT (OUTPATIENT)
Dept: OBGYN | Facility: CLINIC | Age: 35
End: 2025-03-04

## 2025-03-04 VITALS
WEIGHT: 168 LBS | HEART RATE: 76 BPM | SYSTOLIC BLOOD PRESSURE: 125 MMHG | HEIGHT: 63 IN | BODY MASS INDEX: 29.77 KG/M2 | TEMPERATURE: 98.6 F | DIASTOLIC BLOOD PRESSURE: 80 MMHG

## 2025-03-04 DIAGNOSIS — E66.3 OVERWEIGHT: ICD-10-CM

## 2025-03-04 DIAGNOSIS — Z33.1 PREGNANT STATE, INCIDENTAL: ICD-10-CM

## 2025-03-04 DIAGNOSIS — Z95.2 PRESENCE OF PROSTHETIC HEART VALVE: ICD-10-CM

## 2025-03-04 DIAGNOSIS — O09.90 SUPERVISION OF HIGH RISK PREGNANCY, UNSPECIFIED, UNSPECIFIED TRIMESTER: ICD-10-CM

## 2025-03-04 DIAGNOSIS — Z71.89 OTHER SPECIFIED COUNSELING: ICD-10-CM

## 2025-03-04 DIAGNOSIS — Z3A.08 8 WEEKS GESTATION OF PREGNANCY: ICD-10-CM

## 2025-03-04 LAB
BILIRUB UR QL STRIP: NORMAL
CLARITY UR: CLEAR
COLLECTION METHOD: NORMAL
GLUCOSE UR-MCNC: ABNORMAL
HCG UR QL: 0.2 EU/DL
HGB UR QL STRIP.AUTO: ABNORMAL
KETONES UR-MCNC: NORMAL
LEUKOCYTE ESTERASE UR QL STRIP: NORMAL
NITRITE UR QL STRIP: NORMAL
PH UR STRIP: 6
PROT UR STRIP-MCNC: NORMAL
SP GR UR STRIP: 1.02

## 2025-03-04 PROCEDURE — ZZZZZ: CPT

## 2025-03-04 PROCEDURE — 81003 URINALYSIS AUTO W/O SCOPE: CPT | Mod: QW

## 2025-03-04 PROCEDURE — 99214 OFFICE O/P EST MOD 30 MIN: CPT | Mod: TH,25

## 2025-03-04 PROCEDURE — 76830 TRANSVAGINAL US NON-OB: CPT

## 2025-03-20 ENCOUNTER — APPOINTMENT (OUTPATIENT)
Dept: CARDIOLOGY | Facility: CLINIC | Age: 35
End: 2025-03-20
Payer: MEDICAID

## 2025-03-20 ENCOUNTER — NON-APPOINTMENT (OUTPATIENT)
Age: 35
End: 2025-03-20

## 2025-03-20 VITALS
HEART RATE: 92 BPM | HEIGHT: 63 IN | WEIGHT: 168 LBS | DIASTOLIC BLOOD PRESSURE: 78 MMHG | BODY MASS INDEX: 29.77 KG/M2 | SYSTOLIC BLOOD PRESSURE: 122 MMHG

## 2025-03-20 DIAGNOSIS — Q21.0 VENTRICULAR SEPTAL DEFECT: ICD-10-CM

## 2025-03-20 PROCEDURE — 93000 ELECTROCARDIOGRAM COMPLETE: CPT

## 2025-03-20 PROCEDURE — 99214 OFFICE O/P EST MOD 30 MIN: CPT | Mod: 25

## 2025-03-22 ENCOUNTER — NON-APPOINTMENT (OUTPATIENT)
Age: 35
End: 2025-03-22

## 2025-03-23 PROBLEM — N83.8 CALCIFICATION OF OVARY: Status: RESOLVED | Noted: 2022-10-09 | Resolved: 2025-03-23

## 2025-03-23 PROBLEM — Z87.42 HISTORY OF AMENORRHEA: Status: RESOLVED | Noted: 2022-09-15 | Resolved: 2025-03-23

## 2025-03-25 ENCOUNTER — APPOINTMENT (OUTPATIENT)
Dept: OBGYN | Facility: CLINIC | Age: 35
End: 2025-03-25
Payer: MEDICAID

## 2025-03-25 VITALS
TEMPERATURE: 98.6 F | HEART RATE: 80 BPM | WEIGHT: 170 LBS | HEIGHT: 63 IN | DIASTOLIC BLOOD PRESSURE: 78 MMHG | SYSTOLIC BLOOD PRESSURE: 114 MMHG | BODY MASS INDEX: 30.12 KG/M2

## 2025-03-25 DIAGNOSIS — Z71.89 OTHER SPECIFIED COUNSELING: ICD-10-CM

## 2025-03-25 DIAGNOSIS — Z34.81 ENCOUNTER FOR SUPERVISION OF OTHER NORMAL PREGNANCY, FIRST TRIMESTER: ICD-10-CM

## 2025-03-25 DIAGNOSIS — O09.529 SUPERVISION OF ELDERLY MULTIGRAVIDA, UNSPECIFIED TRIMESTER: ICD-10-CM

## 2025-03-25 DIAGNOSIS — O34.219 MATERNAL CARE FOR UNSPECIFIED TYPE SCAR FROM PREVIOUS CESAREAN DELIVERY: ICD-10-CM

## 2025-03-25 DIAGNOSIS — Z31.5 ENCOUNTER FOR PROCREATIVE GENETIC COUNSELING: ICD-10-CM

## 2025-03-25 DIAGNOSIS — Z95.2 PRESENCE OF PROSTHETIC HEART VALVE: ICD-10-CM

## 2025-03-25 DIAGNOSIS — Z3A.11 11 WEEKS GESTATION OF PREGNANCY: ICD-10-CM

## 2025-03-25 DIAGNOSIS — Z86.79 PERSONAL HISTORY OF OTHER DISEASES OF THE CIRCULATORY SYSTEM: ICD-10-CM

## 2025-03-25 DIAGNOSIS — O09.90 SUPERVISION OF HIGH RISK PREGNANCY, UNSPECIFIED, UNSPECIFIED TRIMESTER: ICD-10-CM

## 2025-03-25 DIAGNOSIS — E66.3 OVERWEIGHT: ICD-10-CM

## 2025-03-25 LAB
BILIRUB UR QL STRIP: NORMAL
CLARITY UR: CLEAR
COLLECTION METHOD: NORMAL
GLUCOSE UR-MCNC: NORMAL
HCG UR QL: 0.2 EU/DL
HGB UR QL STRIP.AUTO: ABNORMAL
KETONES UR-MCNC: ABNORMAL
LEUKOCYTE ESTERASE UR QL STRIP: NORMAL
NITRITE UR QL STRIP: NORMAL
PH UR STRIP: 5.5
PROT UR STRIP-MCNC: NORMAL
SP GR UR STRIP: 1.02

## 2025-03-25 PROCEDURE — 99213 OFFICE O/P EST LOW 20 MIN: CPT | Mod: TH,25

## 2025-03-25 PROCEDURE — 81003 URINALYSIS AUTO W/O SCOPE: CPT | Mod: QW

## 2025-03-26 PROBLEM — O09.529 SUPERVISION OF ELDERLY MULTIGRAVIDA: Status: ACTIVE | Noted: 2025-03-26

## 2025-03-26 PROBLEM — Z34.81 ENCOUNTER FOR SUPERVISION OF NORMAL PREGNANCY IN MULTIGRAVIDA IN FIRST TRIMESTER: Status: ACTIVE | Noted: 2022-09-15

## 2025-03-26 PROBLEM — O34.219 PREVIOUS CESAREAN DELIVERY AFFECTING PREGNANCY, ANTEPARTUM: Status: ACTIVE | Noted: 2025-03-26

## 2025-03-26 PROBLEM — Z3A.11 PREGNANCY WITH 11 COMPLETED WEEKS GESTATION: Status: ACTIVE | Noted: 2023-02-14

## 2025-03-26 LAB
25(OH)D3 SERPL-MCNC: 44 NG/ML
ANION GAP SERPL CALC-SCNC: 15 MMOL/L
BASOPHILS # BLD AUTO: 0.02 K/UL
BASOPHILS NFR BLD AUTO: 0.2 %
BUN SERPL-MCNC: 8 MG/DL
CALCIUM SERPL-MCNC: 10.6 MG/DL
CHLORIDE SERPL-SCNC: 102 MMOL/L
CO2 SERPL-SCNC: 22 MMOL/L
CREAT SERPL-MCNC: 0.5 MG/DL
EGFRCR SERPLBLD CKD-EPI 2021: 125 ML/MIN/1.73M2
EOSINOPHIL # BLD AUTO: 0.12 K/UL
EOSINOPHIL NFR BLD AUTO: 1.1 %
ESTIMATED AVERAGE GLUCOSE: 105 MG/DL
GLUCOSE SERPL-MCNC: 93 MG/DL
HBA1C MFR BLD HPLC: 5.3 %
HCT VFR BLD CALC: 42.5 %
HCV AB SER QL: NONREACTIVE
HCV S/CO RATIO: 0.05 COI
HGB BLD-MCNC: 14.5 G/DL
HIV1+2 AB SPEC QL IA.RAPID: NONREACTIVE
IMM GRANULOCYTES NFR BLD AUTO: 0.4 %
LYMPHOCYTES # BLD AUTO: 2.18 K/UL
LYMPHOCYTES NFR BLD AUTO: 20.1 %
MAN DIFF?: NORMAL
MCHC RBC-ENTMCNC: 29.8 PG
MCHC RBC-ENTMCNC: 34.1 G/DL
MCV RBC AUTO: 87.4 FL
MONOCYTES # BLD AUTO: 0.67 K/UL
MONOCYTES NFR BLD AUTO: 6.2 %
NEUTROPHILS # BLD AUTO: 7.81 K/UL
NEUTROPHILS NFR BLD AUTO: 72 %
PLATELET # BLD AUTO: 329 K/UL
PMV BLD AUTO: 0 /100 WBCS
PMV BLD: 9.8 FL
POTASSIUM SERPL-SCNC: 4.5 MMOL/L
PROGEST SERPL-MCNC: 20.22 NG/ML
RBC # BLD: 4.86 M/UL
RBC # FLD: 13.6 %
SODIUM SERPL-SCNC: 139 MMOL/L
T PALLIDUM AB SER QL IA: NEGATIVE
TSH SERPL-ACNC: 0.77 UIU/ML
WBC # FLD AUTO: 10.84 K/UL

## 2025-03-28 LAB
B19V IGG SER QL IA: 1.55 INDEX
B19V IGG+IGM SER-IMP: NORMAL
B19V IGG+IGM SER-IMP: POSITIVE
B19V IGM FLD-ACNC: 0.13 INDEX
B19V IGM SER-ACNC: NEGATIVE
CMV IGG SERPL QL: 1.4 U/ML
CMV IGG SERPL-IMP: POSITIVE
CMV IGM SERPL QL: <8 AU/ML
CMV IGM SERPL QL: NEGATIVE
HBV SURFACE AG SER QL: NONREACTIVE
HGB A MFR BLD: 97.2 %
HGB A2 MFR BLD: 2.8 %
HGB FRACT BLD-IMP: NORMAL
HSV 1+2 IGG SER IA-IMP: NEGATIVE
HSV 1+2 IGG SER IA-IMP: POSITIVE
HSV1 IGG SER QL: >62.2 INDEX
HSV2 IGG SER QL: 0.3 INDEX
LEAD BLD-MCNC: <1 UG/DL
M TB IFN-G BLD-IMP: NEGATIVE
MEV IGG FLD QL IA: 82.4 AU/ML
MEV IGG+IGM SER-IMP: POSITIVE
MUV AB SER-ACNC: POSITIVE
MUV IGG SER QL IA: 261 AU/ML
QUANTIFERON TB PLUS MITOGEN MINUS NIL: 2.42 IU/ML
QUANTIFERON TB PLUS NIL: 0.12 IU/ML
QUANTIFERON TB PLUS TB1 MINUS NIL: 0 IU/ML
QUANTIFERON TB PLUS TB2 MINUS NIL: 0.01 IU/ML
RUBV IGG FLD-ACNC: 2.5 INDEX
RUBV IGG SER-IMP: POSITIVE
T GONDII AB SER-IMP: NEGATIVE
T GONDII AB SER-IMP: NEGATIVE
T GONDII IGG SER QL: <3 IU/ML
T GONDII IGM SER QL: <3 AU/ML
VZV AB TITR SER: POSITIVE
VZV IGG SER IF-ACNC: 1.47 S/CO

## 2025-03-29 LAB — BACTERIA UR CULT: ABNORMAL

## 2025-04-01 ENCOUNTER — ASOB RESULT (OUTPATIENT)
Age: 35
End: 2025-04-01

## 2025-04-01 ENCOUNTER — APPOINTMENT (OUTPATIENT)
Dept: ANTEPARTUM | Facility: CLINIC | Age: 35
End: 2025-04-01
Payer: MEDICAID

## 2025-04-01 VITALS
WEIGHT: 171 LBS | HEART RATE: 82 BPM | DIASTOLIC BLOOD PRESSURE: 80 MMHG | BODY MASS INDEX: 30.3 KG/M2 | SYSTOLIC BLOOD PRESSURE: 114 MMHG | HEIGHT: 63 IN

## 2025-04-01 PROCEDURE — 76813 OB US NUCHAL MEAS 1 GEST: CPT

## 2025-04-02 LAB
AR GENE MUT ANL BLD/T: NORMAL
FMR1 GENE MUT ANL BLD/T: NORMAL

## 2025-04-03 LAB — CFTR MUT TESTED BLD/T: NEGATIVE

## 2025-04-23 ENCOUNTER — APPOINTMENT (OUTPATIENT)
Dept: OBGYN | Facility: CLINIC | Age: 35
End: 2025-04-23
Payer: MEDICAID

## 2025-04-23 VITALS
BODY MASS INDEX: 30.48 KG/M2 | HEIGHT: 63 IN | DIASTOLIC BLOOD PRESSURE: 78 MMHG | TEMPERATURE: 98.6 F | HEART RATE: 99 BPM | WEIGHT: 172 LBS | SYSTOLIC BLOOD PRESSURE: 120 MMHG

## 2025-04-23 DIAGNOSIS — O09.529 SUPERVISION OF ELDERLY MULTIGRAVIDA, UNSPECIFIED TRIMESTER: ICD-10-CM

## 2025-04-23 DIAGNOSIS — O34.219 MATERNAL CARE FOR UNSPECIFIED TYPE SCAR FROM PREVIOUS CESAREAN DELIVERY: ICD-10-CM

## 2025-04-23 DIAGNOSIS — Z3A.15 15 WEEKS GESTATION OF PREGNANCY: ICD-10-CM

## 2025-04-23 DIAGNOSIS — Z71.89 OTHER SPECIFIED COUNSELING: ICD-10-CM

## 2025-04-23 DIAGNOSIS — O99.210 OBESITY COMPLICATING PREGNANCY, UNSPECIFIED TRIMESTER: ICD-10-CM

## 2025-04-23 DIAGNOSIS — O09.90 SUPERVISION OF HIGH RISK PREGNANCY, UNSPECIFIED, UNSPECIFIED TRIMESTER: ICD-10-CM

## 2025-04-23 DIAGNOSIS — Z34.82 ENCOUNTER FOR SUPERVISION OF OTHER NORMAL PREGNANCY, SECOND TRIMESTER: ICD-10-CM

## 2025-04-23 DIAGNOSIS — Z86.79 PERSONAL HISTORY OF OTHER DISEASES OF THE CIRCULATORY SYSTEM: ICD-10-CM

## 2025-04-23 LAB
BILIRUB UR QL STRIP: NORMAL
CLARITY UR: CLEAR
COLLECTION METHOD: NORMAL
GLUCOSE UR-MCNC: NORMAL
HCG UR QL: 0.2 EU/DL
HGB UR QL STRIP.AUTO: ABNORMAL
KETONES UR-MCNC: ABNORMAL
LEUKOCYTE ESTERASE UR QL STRIP: ABNORMAL
NITRITE UR QL STRIP: NORMAL
PH UR STRIP: 6
PROT UR STRIP-MCNC: NORMAL
SP GR UR STRIP: >=1.03

## 2025-04-23 PROCEDURE — 99213 OFFICE O/P EST LOW 20 MIN: CPT | Mod: TH,25

## 2025-04-23 PROCEDURE — 81003 URINALYSIS AUTO W/O SCOPE: CPT | Mod: QW

## 2025-04-28 LAB — BACTERIA UR CULT: ABNORMAL

## 2025-04-30 DIAGNOSIS — O23.40 UNSPECIFIED INFECTION OF URINARY TRACT IN PREGNANCY, UNSPECIFIED TRIMESTER: ICD-10-CM

## 2025-04-30 RX ORDER — NITROFURANTOIN (MONOHYDRATE/MACROCRYSTALS) 25; 75 MG/1; MG/1
100 CAPSULE ORAL
Qty: 14 | Refills: 0 | Status: ACTIVE | COMMUNITY
Start: 2025-04-30

## 2025-05-06 ENCOUNTER — APPOINTMENT (OUTPATIENT)
Dept: OBGYN | Facility: CLINIC | Age: 35
End: 2025-05-06

## 2025-05-06 VITALS
HEART RATE: 81 BPM | BODY MASS INDEX: 30.12 KG/M2 | DIASTOLIC BLOOD PRESSURE: 77 MMHG | SYSTOLIC BLOOD PRESSURE: 121 MMHG | WEIGHT: 170 LBS | HEIGHT: 63 IN | TEMPERATURE: 98.6 F

## 2025-05-06 LAB
BILIRUB UR QL STRIP: NORMAL
CLARITY UR: CLEAR
COLLECTION METHOD: NORMAL
GLUCOSE UR-MCNC: NORMAL
HCG UR QL: 0.2 EU/DL
HGB UR QL STRIP.AUTO: NORMAL
KETONES UR-MCNC: >=160
LEUKOCYTE ESTERASE UR QL STRIP: ABNORMAL
NITRITE UR QL STRIP: NORMAL
PH UR STRIP: 6
PROT UR STRIP-MCNC: NORMAL
SP GR UR STRIP: >=1.03

## 2025-05-07 LAB
ABORH: NORMAL
ANTIBODY SCREEN: NORMAL

## 2025-05-12 LAB
2ND TRIMESTER 4 SCREEN SERPL-IMP: NO
AFP ADJ MOM SERPL: 0.98
AFP INTERP SERPL-IMP: NORMAL
AFP INTERP SERPL-IMP: NORMAL
AFP MOM CUT-OFF: 2.5
AFP PERCENTILE: 46.9
AFP SERPL-ACNC: 36.37 NG/ML
BACTERIA UR CULT: NORMAL
CARBAMAZEPINE?: NO
CMV IGG AVIDITY SERPL IA-RTO: 1
CURRENT SMOKER: NO
DIABETES STATUS PATIENT: NO
GA: NORMAL
GESTATIONAL AGE METHOD: NORMAL
HX OF NTD NARR: NO
MULTIPLE PREGNANCY: NORMAL
NEURAL TUBE DEFECT RISK FETUS: NORMAL
NEURAL TUBE DEFECT RISK POP: NORMAL
TEST PERFORMANCE INFO SPEC: NORMAL
VALPROIC ACID?: NO

## 2025-05-28 ENCOUNTER — ASOB RESULT (OUTPATIENT)
Age: 35
End: 2025-05-28

## 2025-05-28 ENCOUNTER — APPOINTMENT (OUTPATIENT)
Dept: ANTEPARTUM | Facility: CLINIC | Age: 35
End: 2025-05-28
Payer: MEDICAID

## 2025-05-28 VITALS
BODY MASS INDEX: 30.65 KG/M2 | HEIGHT: 63 IN | HEART RATE: 80 BPM | WEIGHT: 173 LBS | SYSTOLIC BLOOD PRESSURE: 114 MMHG | DIASTOLIC BLOOD PRESSURE: 78 MMHG

## 2025-05-28 PROCEDURE — 76811 OB US DETAILED SNGL FETUS: CPT

## 2025-05-28 PROCEDURE — 76817 TRANSVAGINAL US OBSTETRIC: CPT

## 2025-06-03 ENCOUNTER — APPOINTMENT (OUTPATIENT)
Dept: OBGYN | Facility: CLINIC | Age: 35
End: 2025-06-03
Payer: MEDICAID

## 2025-06-03 VITALS
BODY MASS INDEX: 30.12 KG/M2 | TEMPERATURE: 98.6 F | SYSTOLIC BLOOD PRESSURE: 110 MMHG | HEIGHT: 63 IN | HEART RATE: 84 BPM | WEIGHT: 170 LBS | DIASTOLIC BLOOD PRESSURE: 75 MMHG

## 2025-06-03 DIAGNOSIS — O34.219 MATERNAL CARE FOR UNSPECIFIED TYPE SCAR FROM PREVIOUS CESAREAN DELIVERY: ICD-10-CM

## 2025-06-03 DIAGNOSIS — R35.0 FREQUENCY OF MICTURITION: ICD-10-CM

## 2025-06-03 DIAGNOSIS — Z71.89 OTHER SPECIFIED COUNSELING: ICD-10-CM

## 2025-06-03 DIAGNOSIS — Z95.2 PRESENCE OF PROSTHETIC HEART VALVE: ICD-10-CM

## 2025-06-03 DIAGNOSIS — O09.529 SUPERVISION OF ELDERLY MULTIGRAVIDA, UNSPECIFIED TRIMESTER: ICD-10-CM

## 2025-06-03 DIAGNOSIS — O09.90 SUPERVISION OF HIGH RISK PREGNANCY, UNSPECIFIED, UNSPECIFIED TRIMESTER: ICD-10-CM

## 2025-06-03 DIAGNOSIS — Z34.82 ENCOUNTER FOR SUPERVISION OF OTHER NORMAL PREGNANCY, SECOND TRIMESTER: ICD-10-CM

## 2025-06-03 DIAGNOSIS — Z3A.21 21 WEEKS GESTATION OF PREGNANCY: ICD-10-CM

## 2025-06-03 DIAGNOSIS — O99.210 OBESITY COMPLICATING PREGNANCY, UNSPECIFIED TRIMESTER: ICD-10-CM

## 2025-06-03 LAB
BILIRUB UR QL STRIP: NORMAL
CLARITY UR: CLEAR
COLLECTION METHOD: NORMAL
GLUCOSE UR-MCNC: NORMAL
HCG UR QL: 0.2 EU/DL
HGB UR QL STRIP.AUTO: NORMAL
KETONES UR-MCNC: NORMAL
LEUKOCYTE ESTERASE UR QL STRIP: ABNORMAL
NITRITE UR QL STRIP: NORMAL
PH UR STRIP: 6
PROT UR STRIP-MCNC: NORMAL
SP GR UR STRIP: 1.02

## 2025-06-03 PROCEDURE — 99213 OFFICE O/P EST LOW 20 MIN: CPT | Mod: TH,25

## 2025-06-03 PROCEDURE — 81003 URINALYSIS AUTO W/O SCOPE: CPT | Mod: QW

## 2025-06-04 PROBLEM — Z3A.21 PREGNANCY WITH 21 COMPLETED WEEKS GESTATION: Status: ACTIVE | Noted: 2023-02-14

## 2025-06-27 ENCOUNTER — APPOINTMENT (OUTPATIENT)
Dept: OBGYN | Facility: CLINIC | Age: 35
End: 2025-06-27

## 2025-06-27 ENCOUNTER — LABORATORY RESULT (OUTPATIENT)
Age: 35
End: 2025-06-27

## 2025-06-27 LAB
BILIRUB UR QL STRIP: NORMAL
CLARITY UR: CLEAR
COLLECTION METHOD: NORMAL
GLUCOSE UR-MCNC: NORMAL
HCG UR QL: 0.2 EU/DL
HGB UR QL STRIP.AUTO: ABNORMAL
KETONES UR-MCNC: ABNORMAL
LEUKOCYTE ESTERASE UR QL STRIP: ABNORMAL
NITRITE UR QL STRIP: NORMAL
PH UR STRIP: 6
PROT UR STRIP-MCNC: NORMAL
SP GR UR STRIP: 1.02

## 2025-06-27 RX ORDER — PANTOPRAZOLE 40 MG/1
40 TABLET, DELAYED RELEASE ORAL
Qty: 90 | Refills: 1 | Status: ACTIVE | COMMUNITY
Start: 2025-06-27 | End: 1900-01-01

## 2025-06-28 LAB
ALBUMIN SERPL ELPH-MCNC: 4 G/DL
ALP BLD-CCNC: 65 U/L
ALT SERPL-CCNC: 8 U/L
ANION GAP SERPL CALC-SCNC: 15 MMOL/L
APTT BLD: 29.2 SEC
AST SERPL-CCNC: 12 U/L
BASOPHILS # BLD AUTO: 0.03 K/UL
BASOPHILS NFR BLD AUTO: 0.3 %
BILIRUB SERPL-MCNC: 0.5 MG/DL
BUN SERPL-MCNC: 8 MG/DL
CALCIUM SERPL-MCNC: 9.6 MG/DL
CHLORIDE SERPL-SCNC: 102 MMOL/L
CO2 SERPL-SCNC: 21 MMOL/L
CREAT SERPL-MCNC: 0.6 MG/DL
CREAT SPEC-SCNC: 180 MG/DL
CREAT/PROT UR: 0.1 RATIO
EGFRCR SERPLBLD CKD-EPI 2021: 119 ML/MIN/1.73M2
EOSINOPHIL # BLD AUTO: 0.06 K/UL
EOSINOPHIL NFR BLD AUTO: 0.5 %
GLUCOSE 1H P 50 G GLC PO SERPL-MCNC: 156 MG/DL
GLUCOSE SERPL-MCNC: 165 MG/DL
HCT VFR BLD CALC: 36.7 %
HGB BLD-MCNC: 11.7 G/DL
IMM GRANULOCYTES NFR BLD AUTO: 0.4 %
INR PPP: 0.93 RATIO
LDH SERPL-CCNC: 139
LYMPHOCYTES # BLD AUTO: 1.54 K/UL
LYMPHOCYTES NFR BLD AUTO: 13 %
MAN DIFF?: NORMAL
MCHC RBC-ENTMCNC: 31.9 G/DL
MCHC RBC-ENTMCNC: 32 PG
MCV RBC AUTO: 100.3 FL
MONOCYTES # BLD AUTO: 0.55 K/UL
MONOCYTES NFR BLD AUTO: 4.6 %
NEUTROPHILS # BLD AUTO: 9.61 K/UL
NEUTROPHILS NFR BLD AUTO: 81.2 %
PLATELET # BLD AUTO: 247 K/UL
POTASSIUM SERPL-SCNC: 4.1 MMOL/L
PROT SERPL-MCNC: 6.4 G/DL
PROT UR-MCNC: 19 MG/DL
PT BLD: 11 SEC
RBC # BLD: 3.66 M/UL
RBC # FLD: 14.1 %
SODIUM SERPL-SCNC: 138 MMOL/L
URATE SERPL-MCNC: 4.2 MG/DL
WBC # FLD AUTO: 11.84 K/UL

## 2025-06-30 PROBLEM — O99.810 ABNORMAL GLUCOSE AFFECTING PREGNANCY: Status: ACTIVE | Noted: 2025-06-30

## 2025-07-09 ENCOUNTER — APPOINTMENT (OUTPATIENT)
Dept: PEDIATRIC CARDIOLOGY | Facility: CLINIC | Age: 35
End: 2025-07-09
Payer: MEDICAID

## 2025-07-09 PROCEDURE — 93325 DOPPLER ECHO COLOR FLOW MAPG: CPT

## 2025-07-09 PROCEDURE — 76825 ECHO EXAM OF FETAL HEART: CPT

## 2025-07-09 PROCEDURE — 76827 ECHO EXAM OF FETAL HEART: CPT

## 2025-07-09 PROCEDURE — 99205 OFFICE O/P NEW HI 60 MIN: CPT | Mod: 25

## 2025-07-21 ENCOUNTER — APPOINTMENT (OUTPATIENT)
Dept: CARDIOLOGY | Facility: CLINIC | Age: 35
End: 2025-07-21
Payer: MEDICAID

## 2025-07-21 DIAGNOSIS — Z86.79 PERSONAL HISTORY OF OTHER DISEASES OF THE CIRCULATORY SYSTEM: ICD-10-CM

## 2025-07-21 PROCEDURE — 93306 TTE W/DOPPLER COMPLETE: CPT

## 2025-08-06 ENCOUNTER — APPOINTMENT (OUTPATIENT)
Dept: CARDIOLOGY | Facility: CLINIC | Age: 35
End: 2025-08-06
Payer: MEDICAID

## 2025-08-06 ENCOUNTER — APPOINTMENT (OUTPATIENT)
Dept: OBGYN | Facility: CLINIC | Age: 35
End: 2025-08-06

## 2025-08-06 VITALS
HEART RATE: 87 BPM | SYSTOLIC BLOOD PRESSURE: 124 MMHG | HEIGHT: 63 IN | WEIGHT: 175 LBS | DIASTOLIC BLOOD PRESSURE: 68 MMHG | BODY MASS INDEX: 31.01 KG/M2

## 2025-08-06 VITALS
TEMPERATURE: 98.6 F | DIASTOLIC BLOOD PRESSURE: 70 MMHG | WEIGHT: 175 LBS | HEIGHT: 63 IN | HEART RATE: 86 BPM | BODY MASS INDEX: 31.01 KG/M2 | SYSTOLIC BLOOD PRESSURE: 109 MMHG

## 2025-08-06 DIAGNOSIS — Q21.0 VENTRICULAR SEPTAL DEFECT: ICD-10-CM

## 2025-08-06 DIAGNOSIS — Z95.2 PRESENCE OF PROSTHETIC HEART VALVE: ICD-10-CM

## 2025-08-06 PROCEDURE — 93000 ELECTROCARDIOGRAM COMPLETE: CPT

## 2025-08-06 PROCEDURE — 99214 OFFICE O/P EST MOD 30 MIN: CPT | Mod: 25

## 2025-08-06 RX ORDER — PRENATAL VIT 49/IRON FUM/FOLIC 6.75-0.2MG
TABLET ORAL
Refills: 0 | Status: ACTIVE | COMMUNITY

## 2025-08-07 LAB
BILIRUB UR QL STRIP: NORMAL
CLARITY UR: CLEAR
COLLECTION METHOD: NORMAL
GLUCOSE UR-MCNC: NORMAL
HCG UR QL: 0.2 EU/DL
HGB UR QL STRIP.AUTO: NORMAL
KETONES UR-MCNC: ABNORMAL
LEUKOCYTE ESTERASE UR QL STRIP: NORMAL
NITRITE UR QL STRIP: NORMAL
PH UR STRIP: 5.5
PROT UR STRIP-MCNC: ABNORMAL
SP GR UR STRIP: >=1.03

## 2025-08-19 ENCOUNTER — APPOINTMENT (OUTPATIENT)
Dept: ANTEPARTUM | Facility: CLINIC | Age: 35
End: 2025-08-19
Payer: MEDICAID

## 2025-08-19 ENCOUNTER — ASOB RESULT (OUTPATIENT)
Age: 35
End: 2025-08-19

## 2025-08-19 VITALS
DIASTOLIC BLOOD PRESSURE: 60 MMHG | HEIGHT: 63 IN | BODY MASS INDEX: 30.65 KG/M2 | SYSTOLIC BLOOD PRESSURE: 118 MMHG | WEIGHT: 173 LBS

## 2025-08-19 PROCEDURE — ZZZZZ: CPT

## 2025-08-19 PROCEDURE — 76816 OB US FOLLOW-UP PER FETUS: CPT

## 2025-08-19 PROCEDURE — 76818 FETAL BIOPHYS PROFILE W/NST: CPT | Mod: 59

## 2025-08-22 ENCOUNTER — APPOINTMENT (OUTPATIENT)
Dept: OBGYN | Facility: CLINIC | Age: 35
End: 2025-08-22

## 2025-08-22 VITALS
WEIGHT: 176 LBS | DIASTOLIC BLOOD PRESSURE: 70 MMHG | HEIGHT: 63 IN | BODY MASS INDEX: 31.18 KG/M2 | HEART RATE: 89 BPM | SYSTOLIC BLOOD PRESSURE: 116 MMHG

## 2025-08-22 LAB
BILIRUB UR QL STRIP: NORMAL
CLARITY UR: NORMAL
COLLECTION METHOD: NORMAL
GLUCOSE UR-MCNC: NORMAL
HCG UR QL: 1 EU/DL
HGB UR QL STRIP.AUTO: ABNORMAL
KETONES UR-MCNC: NORMAL
LEUKOCYTE ESTERASE UR QL STRIP: NORMAL
NITRITE UR QL STRIP: NORMAL
PH UR STRIP: 6
PROT UR STRIP-MCNC: ABNORMAL
SP GR UR STRIP: 1.03

## 2025-08-26 ENCOUNTER — ASOB RESULT (OUTPATIENT)
Age: 35
End: 2025-08-26

## 2025-08-26 ENCOUNTER — APPOINTMENT (OUTPATIENT)
Dept: ANTEPARTUM | Facility: CLINIC | Age: 35
End: 2025-08-26
Payer: MEDICAID

## 2025-08-26 VITALS
WEIGHT: 175 LBS | DIASTOLIC BLOOD PRESSURE: 70 MMHG | SYSTOLIC BLOOD PRESSURE: 104 MMHG | HEIGHT: 63 IN | HEART RATE: 90 BPM | BODY MASS INDEX: 31.01 KG/M2

## 2025-08-26 DIAGNOSIS — Z98.891 HISTORY OF UTERINE SCAR FROM PREVIOUS SURGERY: ICD-10-CM

## 2025-08-26 DIAGNOSIS — Z3A.21 21 WEEKS GESTATION OF PREGNANCY: ICD-10-CM

## 2025-08-26 DIAGNOSIS — O09.90 SUPERVISION OF HIGH RISK PREGNANCY, UNSPECIFIED, UNSPECIFIED TRIMESTER: ICD-10-CM

## 2025-08-26 PROBLEM — Z3A.33 33 WEEKS GESTATION OF PREGNANCY: Status: ACTIVE | Noted: 2025-08-26

## 2025-08-26 PROCEDURE — 76818 FETAL BIOPHYS PROFILE W/NST: CPT

## 2025-08-26 PROCEDURE — 99211 OFF/OP EST MAY X REQ PHY/QHP: CPT | Mod: TH,25

## 2025-09-02 ENCOUNTER — APPOINTMENT (OUTPATIENT)
Dept: ANTEPARTUM | Facility: CLINIC | Age: 35
End: 2025-09-02
Payer: MEDICAID

## 2025-09-02 ENCOUNTER — ASOB RESULT (OUTPATIENT)
Age: 35
End: 2025-09-02

## 2025-09-02 VITALS
HEART RATE: 83 BPM | HEIGHT: 63 IN | SYSTOLIC BLOOD PRESSURE: 100 MMHG | BODY MASS INDEX: 30.48 KG/M2 | WEIGHT: 172 LBS | DIASTOLIC BLOOD PRESSURE: 66 MMHG

## 2025-09-02 DIAGNOSIS — Z3A.33 33 WEEKS GESTATION OF PREGNANCY: ICD-10-CM

## 2025-09-02 PROCEDURE — 76818 FETAL BIOPHYS PROFILE W/NST: CPT

## 2025-09-02 PROCEDURE — ZZZZZ: CPT

## 2025-09-02 PROCEDURE — 99211 OFF/OP EST MAY X REQ PHY/QHP: CPT | Mod: TH,25

## 2025-09-05 PROBLEM — Z3A.34 34 WEEKS GESTATION OF PREGNANCY: Status: ACTIVE | Noted: 2025-09-02

## 2025-09-08 ENCOUNTER — APPOINTMENT (OUTPATIENT)
Dept: OBGYN | Facility: CLINIC | Age: 35
End: 2025-09-08

## 2025-09-08 ENCOUNTER — APPOINTMENT (OUTPATIENT)
Dept: ANTEPARTUM | Facility: CLINIC | Age: 35
End: 2025-09-08
Payer: MEDICAID

## 2025-09-08 ENCOUNTER — ASOB RESULT (OUTPATIENT)
Age: 35
End: 2025-09-08

## 2025-09-08 VITALS
BODY MASS INDEX: 31.18 KG/M2 | HEIGHT: 63 IN | WEIGHT: 176 LBS | HEART RATE: 102 BPM | DIASTOLIC BLOOD PRESSURE: 68 MMHG | SYSTOLIC BLOOD PRESSURE: 105 MMHG

## 2025-09-08 VITALS
HEART RATE: 92 BPM | WEIGHT: 176 LBS | DIASTOLIC BLOOD PRESSURE: 71 MMHG | SYSTOLIC BLOOD PRESSURE: 111 MMHG | TEMPERATURE: 98.6 F | HEIGHT: 63 IN | BODY MASS INDEX: 31.18 KG/M2

## 2025-09-08 DIAGNOSIS — Z3A.34 34 WEEKS GESTATION OF PREGNANCY: ICD-10-CM

## 2025-09-08 PROBLEM — Z3A.35 PREGNANCY WITH 35 COMPLETED WEEKS GESTATION: Status: ACTIVE | Noted: 2023-04-25

## 2025-09-08 PROCEDURE — 99212 OFFICE O/P EST SF 10 MIN: CPT | Mod: TH,25

## 2025-09-08 PROCEDURE — 76816 OB US FOLLOW-UP PER FETUS: CPT

## 2025-09-09 LAB
25(OH)D3 SERPL-MCNC: 40 NG/ML
BASOPHILS # BLD AUTO: 0.03 K/UL
BASOPHILS NFR BLD AUTO: 0.3 %
BILIRUB UR QL STRIP: ABNORMAL
CLARITY UR: NORMAL
COLLECTION METHOD: NORMAL
EOSINOPHIL # BLD AUTO: 0.13 K/UL
EOSINOPHIL NFR BLD AUTO: 1.1 %
GLUCOSE UR-MCNC: NORMAL
HCG UR QL: 0.2 EU/DL
HCT VFR BLD CALC: 37.6 %
HGB BLD-MCNC: 12.8 G/DL
HGB UR QL STRIP.AUTO: ABNORMAL
HIV1+2 AB SPEC QL IA.RAPID: NONREACTIVE
IMM GRANULOCYTES NFR BLD AUTO: 0.8 %
KETONES UR-MCNC: NORMAL
LEUKOCYTE ESTERASE UR QL STRIP: NORMAL
LYMPHOCYTES # BLD AUTO: 1.68 K/UL
LYMPHOCYTES NFR BLD AUTO: 14 %
MAN DIFF?: NORMAL
MCHC RBC-ENTMCNC: 31.5 PG
MCHC RBC-ENTMCNC: 34 G/DL
MCV RBC AUTO: 92.6 FL
MONOCYTES # BLD AUTO: 0.92 K/UL
MONOCYTES NFR BLD AUTO: 7.7 %
NEUTROPHILS # BLD AUTO: 9.13 K/UL
NEUTROPHILS NFR BLD AUTO: 76.1 %
NITRITE UR QL STRIP: NORMAL
PH UR STRIP: 5.5
PLATELET # BLD AUTO: 209 K/UL
PMV BLD AUTO: 0 /100 WBCS
PMV BLD: 9.7 FL
PROT UR STRIP-MCNC: ABNORMAL
RBC # BLD: 4.06 M/UL
RBC # FLD: 13.4 %
SP GR UR STRIP: 1.03
T PALLIDUM AB SER QL IA: NEGATIVE
WBC # FLD AUTO: 11.99 K/UL

## 2025-09-15 ENCOUNTER — ASOB RESULT (OUTPATIENT)
Age: 35
End: 2025-09-15

## 2025-09-15 ENCOUNTER — APPOINTMENT (OUTPATIENT)
Dept: ANTEPARTUM | Facility: CLINIC | Age: 35
End: 2025-09-15
Payer: MEDICAID

## 2025-09-15 VITALS
HEART RATE: 99 BPM | HEIGHT: 63 IN | OXYGEN SATURATION: 98 % | DIASTOLIC BLOOD PRESSURE: 73 MMHG | WEIGHT: 173 LBS | SYSTOLIC BLOOD PRESSURE: 107 MMHG | BODY MASS INDEX: 30.65 KG/M2

## 2025-09-15 VITALS
HEART RATE: 99 BPM | HEIGHT: 63 IN | BODY MASS INDEX: 30.65 KG/M2 | WEIGHT: 173 LBS | DIASTOLIC BLOOD PRESSURE: 73 MMHG | OXYGEN SATURATION: 98 % | SYSTOLIC BLOOD PRESSURE: 107 MMHG

## 2025-09-15 DIAGNOSIS — Z31.5 ENCOUNTER FOR PROCREATIVE GENETIC COUNSELING: ICD-10-CM

## 2025-09-15 DIAGNOSIS — Z71.2 PERSON CONSULTING FOR EXPLANATION OF EXAMINATION OR TEST FINDINGS: ICD-10-CM

## 2025-09-15 DIAGNOSIS — Z3A.35 35 WEEKS GESTATION OF PREGNANCY: ICD-10-CM

## 2025-09-15 DIAGNOSIS — O09.529 SUPERVISION OF ELDERLY MULTIGRAVIDA, UNSPECIFIED TRIMESTER: ICD-10-CM

## 2025-09-15 DIAGNOSIS — Z12.4 ENCOUNTER FOR SCREENING FOR MALIGNANT NEOPLASM OF CERVIX: ICD-10-CM

## 2025-09-15 DIAGNOSIS — Z34.82 ENCOUNTER FOR SUPERVISION OF OTHER NORMAL PREGNANCY, SECOND TRIMESTER: ICD-10-CM

## 2025-09-15 DIAGNOSIS — Z23 ENCOUNTER FOR IMMUNIZATION: ICD-10-CM

## 2025-09-15 DIAGNOSIS — Z87.19 PERSONAL HISTORY OF OTHER DISEASES OF THE DIGESTIVE SYSTEM: ICD-10-CM

## 2025-09-15 DIAGNOSIS — Z87.74 PERSONAL HISTORY OF (CORRECTED) CONGENITAL MALFORMATIONS OF HEART AND CIRCULATORY SYSTEM: ICD-10-CM

## 2025-09-15 DIAGNOSIS — Z01.419 ENCOUNTER FOR GYNECOLOGICAL EXAMINATION (GENERAL) (ROUTINE) W/OUT ABNORMAL FINDINGS: ICD-10-CM

## 2025-09-15 DIAGNOSIS — Z87.898 PERSONAL HISTORY OF OTHER SPECIFIED CONDITIONS: ICD-10-CM

## 2025-09-15 DIAGNOSIS — R89.4 ABNORMAL IMMUNOLOGICAL FINDINGS IN SPECIMENS FROM OTHER ORGANS, SYSTEMS AND TISSUES: ICD-10-CM

## 2025-09-15 DIAGNOSIS — Z30.09 ENCOUNTER FOR OTHER GENERAL COUNSELING AND ADVICE ON CONTRACEPTION: ICD-10-CM

## 2025-09-15 DIAGNOSIS — Z86.79 PERSONAL HISTORY OF OTHER DISEASES OF THE CIRCULATORY SYSTEM: ICD-10-CM

## 2025-09-15 DIAGNOSIS — Z48.89 ENCOUNTER FOR OTHER SPECIFIED SURGICAL AFTERCARE: ICD-10-CM

## 2025-09-15 DIAGNOSIS — O99.810 ABNORMAL GLUCOSE COMPLICATING PREGNANCY: ICD-10-CM

## 2025-09-15 DIAGNOSIS — Z87.42 PERSONAL HISTORY OF OTHER DISEASES OF THE FEMALE GENITAL TRACT: ICD-10-CM

## 2025-09-15 DIAGNOSIS — O23.40 UNSPECIFIED INFECTION OF URINARY TRACT IN PREGNANCY, UNSPECIFIED TRIMESTER: ICD-10-CM

## 2025-09-15 DIAGNOSIS — Z95.2 PRESENCE OF PROSTHETIC HEART VALVE: ICD-10-CM

## 2025-09-15 DIAGNOSIS — Z98.891 HISTORY OF UTERINE SCAR FROM PREVIOUS SURGERY: ICD-10-CM

## 2025-09-15 DIAGNOSIS — I05.9 RHEUMATIC MITRAL VALVE DISEASE, UNSPECIFIED: ICD-10-CM

## 2025-09-15 PROBLEM — Z3A.36 PREGNANCY WITH 36 COMPLETED WEEKS GESTATION: Status: ACTIVE | Noted: 2023-09-06

## 2025-09-15 PROBLEM — Z71.89 OTHER SPECIFIED COUNSELING: Status: RESOLVED | Noted: 2022-09-15 | Resolved: 2025-09-15

## 2025-09-15 PROCEDURE — 76818 FETAL BIOPHYS PROFILE W/NST: CPT

## 2025-09-15 PROCEDURE — 99211 OFF/OP EST MAY X REQ PHY/QHP: CPT | Mod: TH,25

## 2025-09-26 PROBLEM — Z3A.37 37 WEEKS GESTATION OF PREGNANCY: Status: ACTIVE | Noted: 2023-09-06

## (undated) DEVICE — STRYKER INTERPULSE HANDPIECE W IRR SUCTION TUBE

## (undated) DEVICE — DRAIN CHANNEL 19FR ROUND FULL FLUTED

## (undated) DEVICE — URETERAL CATH RED RUBBER 18FR (RED)

## (undated) DEVICE — FOLEY TRAY 16FR 5CC LF LUBRISIL ADVANCE TEMP CLOSED

## (undated) DEVICE — VESSEL LOOP KEY SURG MINI RED 2.4X1.2MM

## (undated) DEVICE — SWITCH ARISS TABLE MOUNT EQUAL LEGS 13"

## (undated) DEVICE — BLADE SCALPEL SAFETYLOCK #11

## (undated) DEVICE — FILTER REINFUSION FOR SALVAGED BLOOD DISP

## (undated) DEVICE — SOL NORMOSOL-R PH7.4 1000ML

## (undated) DEVICE — TUBING ATS SUCTION LINE

## (undated) DEVICE — SUMP PERICARDIAL 20FR 1/4" ADULT

## (undated) DEVICE — NDL COUNTER FOAM AND MAGNET 40-70

## (undated) DEVICE — POSITIONER FOAM EGG CRATE ULNAR 2PCS (PINK)

## (undated) DEVICE — SUMP INTRACARDIAC 20FR 1/4" ADULT

## (undated) DEVICE — DRAPE 3/4 SHEET W REINFORCEMENT 56X77"

## (undated) DEVICE — CONNECTOR STRAIGHT 3/8 X 1/2"

## (undated) DEVICE — DRAIN CHANNEL 32FR ROUND HUBLESS FULL FLUTED

## (undated) DEVICE — SUT GORETEX CV-5 (4-0) 36" TTC-13

## (undated) DEVICE — SWITCH ARISS TABLE MOUNT UNEQUAL LEGS 13"

## (undated) DEVICE — ELCTR BOVIE TIP BLADE MEGADYNE E-Z CLEAN 2.5" (SHORT)

## (undated) DEVICE — SOL IRR POUR H2O 250ML

## (undated) DEVICE — VESSEL LOOP KEY SURG SUPERMAXI BLUE 4.7X1.3MM

## (undated) DEVICE — SUT TICRON 4-0 36" CV-331 DA

## (undated) DEVICE — SUT PROLENE 4-0 36" RB-1

## (undated) DEVICE — SYR LUER LOK 1CC

## (undated) DEVICE — DRAPE TOWEL BLUE 17" X 24"

## (undated) DEVICE — SYR ASEPTO

## (undated) DEVICE — NDL HYPO SAFE 18G X 1.5" (PINK)

## (undated) DEVICE — PACING CABLE A/V TEMP SCREW DOWN 6FT

## (undated) DEVICE — Device

## (undated) DEVICE — LAP PAD 18 X 18"

## (undated) DEVICE — GLV 7 PROTEXIS (WHITE)

## (undated) DEVICE — SUT VICRYL 3-0 27" CT-1

## (undated) DEVICE — GLV 7.5 PROTEXIS (WHITE)

## (undated) DEVICE — PACK UNIVERSAL CARDIAC

## (undated) DEVICE — TUBING TRUWAVE PRESSURE MALE/FEMALE 72"

## (undated) DEVICE — DRAPE MAYO STAND 30"

## (undated) DEVICE — GOWN TRIMAX XXL

## (undated) DEVICE — BLADE SCALPEL SAFETYLOCK #10

## (undated) DEVICE — PREP DURAPREP 26CC

## (undated) DEVICE — MARKING PEN W RULER

## (undated) DEVICE — CONNECTOR STRAIGHT 3/8 X 3/8"

## (undated) DEVICE — GLV 8.5 PROTEXIS (WHITE)

## (undated) DEVICE — SUT SILK 2-0 18" SH (POP-OFF)

## (undated) DEVICE — STAPLER SKIN VISI-STAT 35 WIDE

## (undated) DEVICE — DRAIN RESERVOIR FOR JACKSON PRATT 100CC CARDINAL

## (undated) DEVICE — SPONGE PEANUT AUTO COUNT

## (undated) DEVICE — SYR LUER LOK 30CC

## (undated) DEVICE — SAW BLADE MICROAIRE STERNUM 1X34X9.4MM

## (undated) DEVICE — DRAPE SLUSH MACHINE 48" X 48"

## (undated) DEVICE — GLV 8 PROTEXIS (WHITE)

## (undated) DEVICE — SUT PLEDGET 9MM X 4MM X 1.5MM

## (undated) DEVICE — TOURNIQUET SET 12FR (1 RED, 1 BLUE, 1 SNARE) 7"

## (undated) DEVICE — DRSG STERISTRIPS 0.5 X 4"

## (undated) DEVICE — SUT PROLENE 4-0 36" BB

## (undated) DEVICE — GLV 6.5 PROTEXIS (WHITE)

## (undated) DEVICE — CONNECTOR "Y" 1/2 X 3/8 X 3/8"

## (undated) DEVICE — CATH IV JELCO 20G X 1.25" (PINK)

## (undated) DEVICE — DRSG TEGADERM 6"X8"

## (undated) DEVICE — SUT PROLENE 5-0 36" RB-1

## (undated) DEVICE — SUCTION TUBE CARDIAC SOFT TIP 6FR SHAFT 10FR TIP 6"

## (undated) DEVICE — BLADE SCALPEL SAFETYLOCK #15

## (undated) DEVICE — SUT PROLENE 4-0 36" SH

## (undated) DEVICE — POSITIONER CARDIAC BUMP

## (undated) DEVICE — DRAPE LIGHT HANDLE COVER (BLUE)

## (undated) DEVICE — NDL TAPR FR EYE 1/2 CIR 3

## (undated) DEVICE — SUT POLYSORB 3-0 30" V-20 UNDYED

## (undated) DEVICE — SET PERF CARDIOPLEGIA 4 ARM STRL

## (undated) DEVICE — VISITEC 4X4

## (undated) DEVICE — SUT SOFSILK 4-0 24" CV-15

## (undated) DEVICE — SENSOR MYOCARDIAL TEMP 15MM

## (undated) DEVICE — SUT TICRON 2-0 36" CV-316 DA

## (undated) DEVICE — SUT BLUNT SZ 5

## (undated) DEVICE — CONNECTOR STRAIGHT 1/2 X 1/2"

## (undated) DEVICE — STOPCOCK 4-WAY 2 GANG W SWIVEL MALE LUER LOCK

## (undated) DEVICE — SUT PROLENE 5-0 30" RB-2

## (undated) DEVICE — SUT STAINLESS STEEL 5 18" SCC

## (undated) DEVICE — TUBING KIT FAST START ATF 40

## (undated) DEVICE — SPECIMEN CONTAINER 100ML

## (undated) DEVICE — GOWN LG

## (undated) DEVICE — SUT BIOSYN 4-0 18" P-12

## (undated) DEVICE — SUT SOFSILK 0 30" TIES

## (undated) DEVICE — PREP BETADINE SPONGE STICKS

## (undated) DEVICE — GOWN TRIMAX LG

## (undated) DEVICE — SUT SOFSILK 0 30" V-20

## (undated) DEVICE — SUT DOUBLE 6 WIRE STERNAL

## (undated) DEVICE — CHEST DRAIN PLEUR-EVAC WET/WET ADULT-PEDS SINGLE (QUICK)

## (undated) DEVICE — DRAPE 1/2 SHEET 40X57"

## (undated) DEVICE — SOL IRR BAG NS 0.9% 3000ML

## (undated) DEVICE — SUT PLEDGET PRE PUNCH 4.8 X 9.5 X 1.5 MM

## (undated) DEVICE — DRSG OPSITE 13.75 X 4"

## (undated) DEVICE — SUT PROLENE 7-0 24" BV175-8 MULTIPASS

## (undated) DEVICE — PACK CUSTOM W/INSPIRE OXYGENATOR

## (undated) DEVICE — ELCTR BOVIE TIP BLADE MEGADYNE E-Z CLEAN 6.5" (LONG)

## (undated) DEVICE — SUT BOOT STANDARD (ASSORTED) 5 PAIR

## (undated) DEVICE — SUT PROLENE 3-0 36" SH

## (undated) DEVICE — SYR LUER LOK 50CC

## (undated) DEVICE — ELCTR HEX BLADE

## (undated) DEVICE — DRSG DERMABOND PRINEO 60CM

## (undated) DEVICE — PACK UNIVERSAL CARDIAC SUPPLEMNTAL B

## (undated) DEVICE — MEDTRONIC URCHIN EVO HEART POSITIONER & CANISTER TUBING SET

## (undated) DEVICE — SOL IRR POUR NS 0.9% 500ML

## (undated) DEVICE — SUT SURGICAL STEEL 6 30" BP-1

## (undated) DEVICE — SUT POLYSORB 0 36" GS-25 UNDYED

## (undated) DEVICE — SUCTION YANKAUER NO CONTROL VENT

## (undated) DEVICE — WARMING BLANKET DUO-THERM HYPER/HYPOTHERM ADULT